# Patient Record
Sex: FEMALE | Race: WHITE | NOT HISPANIC OR LATINO | Employment: UNEMPLOYED | URBAN - METROPOLITAN AREA
[De-identification: names, ages, dates, MRNs, and addresses within clinical notes are randomized per-mention and may not be internally consistent; named-entity substitution may affect disease eponyms.]

---

## 2017-01-27 ENCOUNTER — HOSPITAL ENCOUNTER (OUTPATIENT)
Dept: URGENT CARE | Facility: CLINIC | Age: 32
Discharge: HOME | End: 2017-01-27
Attending: ALLERGY & IMMUNOLOGY | Admitting: ALLERGY & IMMUNOLOGY

## 2017-03-13 ENCOUNTER — OFFICE (OUTPATIENT)
Dept: URBAN - METROPOLITAN AREA CLINIC 64 | Facility: CLINIC | Age: 32
End: 2017-03-13
Payer: MEDICAID

## 2017-03-13 VITALS
WEIGHT: 203 LBS | DIASTOLIC BLOOD PRESSURE: 85 MMHG | SYSTOLIC BLOOD PRESSURE: 113 MMHG | HEART RATE: 101 BPM | HEIGHT: 59 IN

## 2017-03-13 DIAGNOSIS — K21.9 GASTRO-ESOPHAGEAL REFLUX DISEASE WITHOUT ESOPHAGITIS: ICD-10-CM

## 2017-03-13 DIAGNOSIS — K58.9 IRRITABLE BOWEL SYNDROME WITHOUT DIARRHEA: ICD-10-CM

## 2017-03-13 PROCEDURE — 99212 OFFICE O/P EST SF 10 MIN: CPT | Performed by: INTERNAL MEDICINE

## 2017-03-13 RX ORDER — LANSOPRAZOLE 30 MG/1
30 CAPSULE, DELAYED RELEASE ORAL
Qty: 30 | Refills: 11 | Status: COMPLETED
Start: 2017-03-13 | End: 2017-06-14

## 2017-03-28 ENCOUNTER — HOSPITAL ENCOUNTER (OUTPATIENT)
Dept: FAMILY MEDICINE CLINIC | Facility: CLINIC | Age: 32
Setting detail: SPECIMEN
Discharge: HOME | End: 2017-03-28
Attending: PHYSICIAN ASSISTANT | Admitting: PHYSICIAN ASSISTANT

## 2017-03-28 ENCOUNTER — CONVERSION ENCOUNTER (OUTPATIENT)
Dept: FAMILY MEDICINE CLINIC | Facility: CLINIC | Age: 32
End: 2017-03-28

## 2017-03-28 LAB
ALBUMIN SERPL-MCNC: 4.1 G/DL (ref 3.5–4.8)
ALBUMIN/GLOB SERPL: 1.3 {RATIO} (ref 1–1.7)
ALP SERPL-CCNC: 78 IU/L (ref 32–91)
ALT SERPL-CCNC: 23 IU/L (ref 14–54)
ANION GAP SERPL CALC-SCNC: 13.8 MMOL/L (ref 10–20)
AST SERPL-CCNC: 21 IU/L (ref 15–41)
BASOPHILS # BLD AUTO: 0 10*3/UL (ref 0–0.2)
BASOPHILS NFR BLD AUTO: 1 % (ref 0–2)
BILIRUB SERPL-MCNC: 1 MG/DL (ref 0.3–1.2)
BUN SERPL-MCNC: 9 MG/DL (ref 8–20)
BUN/CREAT SERPL: 9 (ref 5.4–26.2)
CALCIUM SERPL-MCNC: 9.7 MG/DL (ref 8.9–10.3)
CHLORIDE SERPL-SCNC: 104 MMOL/L (ref 101–111)
CHOLEST SERPL-MCNC: 172 MG/DL
CHOLEST/HDLC SERPL: 4.6 {RATIO}
CONV CO2: 22 MMOL/L (ref 22–32)
CONV LDL CHOLESTEROL DIRECT: 119 MG/DL (ref 0–100)
CONV TOTAL PROTEIN: 7.2 G/DL (ref 6.1–7.9)
CREAT UR-MCNC: 1 MG/DL (ref 0.4–1)
DIFFERENTIAL METHOD BLD: (no result)
EOSINOPHIL # BLD AUTO: 0.1 10*3/UL (ref 0–0.3)
EOSINOPHIL # BLD AUTO: 1 % (ref 0–3)
ERYTHROCYTE [DISTWIDTH] IN BLOOD BY AUTOMATED COUNT: 18.7 % (ref 11.5–14.5)
GLOBULIN UR ELPH-MCNC: 3.1 G/DL (ref 2.5–3.8)
GLUCOSE SERPL-MCNC: 83 MG/DL (ref 65–99)
HCT VFR BLD AUTO: 34.6 % (ref 35–49)
HDLC SERPL-MCNC: 37 MG/DL
HGB BLD-MCNC: 12.1 G/DL (ref 12–15)
LDLC/HDLC SERPL: 3.2 {RATIO}
LIPID INTERPRETATION: ABNORMAL
LYMPHOCYTES # BLD AUTO: 2 10*3/UL (ref 0.8–4.8)
LYMPHOCYTES NFR BLD AUTO: 30 % (ref 18–42)
MCH RBC QN AUTO: 27.2 PG (ref 26–32)
MCHC RBC AUTO-ENTMCNC: 35 G/DL (ref 32–36)
MCV RBC AUTO: 77.7 FL (ref 80–94)
MONOCYTES # BLD AUTO: 0.4 10*3/UL (ref 0.1–1.3)
MONOCYTES NFR BLD AUTO: 7 % (ref 2–11)
NEUTROPHILS # BLD AUTO: 4.1 10*3/UL (ref 2.3–8.6)
NEUTROPHILS NFR BLD AUTO: 61 % (ref 50–75)
NRBC BLD AUTO-RTO: 0 /100{WBCS}
NRBC/RBC NFR BLD MANUAL: 0 10*3/UL
PLATELET # BLD AUTO: 223 10*3/UL (ref 150–450)
PMV BLD AUTO: 9.5 FL (ref 7.4–10.4)
POTASSIUM SERPL-SCNC: 3.8 MMOL/L (ref 3.6–5.1)
RBC # BLD AUTO: 4.45 10*6/UL (ref 4–5.4)
SODIUM SERPL-SCNC: 136 MMOL/L (ref 136–144)
TRIGL SERPL-MCNC: 121 MG/DL
TSH SERPL-ACNC: 1.96 UIU/ML (ref 0.34–5.6)
VLDLC SERPL CALC-MCNC: 15.8 MG/DL
WBC # BLD AUTO: 6.7 10*3/UL (ref 4.5–11.5)

## 2017-05-22 ENCOUNTER — HOSPITAL ENCOUNTER (OUTPATIENT)
Dept: FAMILY MEDICINE CLINIC | Facility: CLINIC | Age: 32
Setting detail: SPECIMEN
Discharge: HOME | End: 2017-05-22
Attending: NURSE PRACTITIONER | Admitting: NURSE PRACTITIONER

## 2017-05-22 ENCOUNTER — CONVERSION ENCOUNTER (OUTPATIENT)
Dept: FAMILY MEDICINE CLINIC | Facility: CLINIC | Age: 32
End: 2017-05-22

## 2017-06-14 ENCOUNTER — OFFICE (OUTPATIENT)
Dept: URBAN - METROPOLITAN AREA CLINIC 64 | Facility: CLINIC | Age: 32
End: 2017-06-14
Payer: MEDICAID

## 2017-06-14 VITALS
HEART RATE: 102 BPM | SYSTOLIC BLOOD PRESSURE: 122 MMHG | HEIGHT: 59 IN | DIASTOLIC BLOOD PRESSURE: 83 MMHG | WEIGHT: 210 LBS

## 2017-06-14 DIAGNOSIS — R14.0 ABDOMINAL DISTENSION (GASEOUS): ICD-10-CM

## 2017-06-14 DIAGNOSIS — K58.9 IRRITABLE BOWEL SYNDROME WITHOUT DIARRHEA: ICD-10-CM

## 2017-06-14 PROCEDURE — 99213 OFFICE O/P EST LOW 20 MIN: CPT | Performed by: NURSE PRACTITIONER

## 2017-06-22 ENCOUNTER — HOSPITAL ENCOUNTER (OUTPATIENT)
Dept: LAB | Facility: HOSPITAL | Age: 32
Discharge: HOME | End: 2017-06-22
Attending: PSYCHIATRY & NEUROLOGY | Admitting: PSYCHIATRY & NEUROLOGY

## 2017-06-22 LAB — GLUCOSE SERPL-MCNC: 105 MG/DL (ref 65–99)

## 2017-06-28 ENCOUNTER — CONVERSION ENCOUNTER (OUTPATIENT)
Dept: FAMILY MEDICINE CLINIC | Facility: CLINIC | Age: 32
End: 2017-06-28

## 2017-06-28 ENCOUNTER — HOSPITAL ENCOUNTER (OUTPATIENT)
Dept: FAMILY MEDICINE CLINIC | Facility: CLINIC | Age: 32
Setting detail: SPECIMEN
Discharge: HOME | End: 2017-06-28
Attending: FAMILY MEDICINE | Admitting: FAMILY MEDICINE

## 2017-06-28 LAB
CHOLEST SERPL-MCNC: 160 MG/DL
CHOLEST/HDLC SERPL: 4.2 {RATIO}
CONV LDL CHOLESTEROL DIRECT: 112 MG/DL (ref 0–100)
HDLC SERPL-MCNC: 38 MG/DL
LDLC/HDLC SERPL: 3 {RATIO}
LIPID INTERPRETATION: ABNORMAL
TRIGL SERPL-MCNC: 93 MG/DL
TSH SERPL-ACNC: 2.44 UIU/ML (ref 0.34–5.6)
VLDLC SERPL CALC-MCNC: 10.1 MG/DL

## 2017-10-20 ENCOUNTER — CONVERSION ENCOUNTER (OUTPATIENT)
Dept: FAMILY MEDICINE CLINIC | Facility: CLINIC | Age: 32
End: 2017-10-20

## 2017-11-11 ENCOUNTER — CONVERSION ENCOUNTER (OUTPATIENT)
Dept: FAMILY MEDICINE CLINIC | Facility: CLINIC | Age: 32
End: 2017-11-11

## 2017-12-29 ENCOUNTER — CONVERSION ENCOUNTER (OUTPATIENT)
Dept: FAMILY MEDICINE CLINIC | Facility: CLINIC | Age: 32
End: 2017-12-29

## 2017-12-29 ENCOUNTER — HOSPITAL ENCOUNTER (OUTPATIENT)
Dept: FAMILY MEDICINE CLINIC | Facility: CLINIC | Age: 32
Setting detail: SPECIMEN
Discharge: HOME | End: 2017-12-29
Attending: FAMILY MEDICINE | Admitting: FAMILY MEDICINE

## 2017-12-29 LAB
ALBUMIN SERPL-MCNC: 4.3 G/DL (ref 3.5–4.8)
ALBUMIN/GLOB SERPL: 1.4 {RATIO} (ref 1–1.7)
ALP SERPL-CCNC: 67 IU/L (ref 32–91)
ALT SERPL-CCNC: 21 IU/L (ref 14–54)
ANION GAP SERPL CALC-SCNC: 10 MMOL/L (ref 10–20)
AST SERPL-CCNC: 21 IU/L (ref 15–41)
BASOPHILS # BLD AUTO: 0 10*3/UL (ref 0–0.2)
BASOPHILS NFR BLD AUTO: 0 % (ref 0–2)
BILIRUB SERPL-MCNC: 0.7 MG/DL (ref 0.3–1.2)
BILIRUB UR QL STRIP: NEGATIVE MG/DL
BUN SERPL-MCNC: 6 MG/DL (ref 8–20)
BUN/CREAT SERPL: 7.5 (ref 5.4–26.2)
CALCIUM SERPL-MCNC: 9.6 MG/DL (ref 8.9–10.3)
CASTS URNS QL MICRO: NORMAL /[LPF]
CHLORIDE SERPL-SCNC: 106 MMOL/L (ref 101–111)
COLOR UR: YELLOW
CONV BACTERIA IN URINE MICRO: NEGATIVE
CONV CLARITY OF URINE: CLEAR
CONV CO2: 25 MMOL/L (ref 22–32)
CONV HYALINE CASTS IN URINE MICRO: 1 /[LPF] (ref 0–5)
CONV PROTEIN IN URINE BY AUTOMATED TEST STRIP: NEGATIVE MG/DL
CONV SMALL ROUND CELLS: NORMAL /[HPF]
CONV TOTAL PROTEIN: 7.3 G/DL (ref 6.1–7.9)
CONV UROBILINOGEN IN URINE BY AUTOMATED TEST STRIP: 0.2 MG/DL
CREAT UR-MCNC: 0.8 MG/DL (ref 0.4–1)
CULTURE INDICATED?: NORMAL
DIFFERENTIAL METHOD BLD: (no result)
EOSINOPHIL # BLD AUTO: 0.1 10*3/UL (ref 0–0.3)
EOSINOPHIL # BLD AUTO: 2 % (ref 0–3)
ERYTHROCYTE [DISTWIDTH] IN BLOOD BY AUTOMATED COUNT: 19.1 % (ref 11.5–14.5)
GLOBULIN UR ELPH-MCNC: 3 G/DL (ref 2.5–3.8)
GLUCOSE SERPL-MCNC: 97 MG/DL (ref 65–99)
GLUCOSE UR QL: NEGATIVE MG/DL
HCT VFR BLD AUTO: 36.4 % (ref 35–49)
HGB BLD-MCNC: 12.1 G/DL (ref 12–15)
HGB UR QL STRIP: NEGATIVE
KETONES UR QL STRIP: NEGATIVE MG/DL
LEUKOCYTE ESTERASE UR QL STRIP: NEGATIVE
LYMPHOCYTES # BLD AUTO: 1.8 10*3/UL (ref 0.8–4.8)
LYMPHOCYTES NFR BLD AUTO: 29 % (ref 18–42)
MCH RBC QN AUTO: 26.4 PG (ref 26–32)
MCHC RBC AUTO-ENTMCNC: 33.1 G/DL (ref 32–36)
MCV RBC AUTO: 79.8 FL (ref 80–94)
MONOCYTES # BLD AUTO: 0.6 10*3/UL (ref 0.1–1.3)
MONOCYTES NFR BLD AUTO: 10 % (ref 2–11)
NEUTROPHILS # BLD AUTO: 3.7 10*3/UL (ref 2.3–8.6)
NEUTROPHILS NFR BLD AUTO: 59 % (ref 50–75)
NITRITE UR QL STRIP: NEGATIVE
NRBC BLD AUTO-RTO: 0 /100{WBCS}
NRBC/RBC NFR BLD MANUAL: 0 10*3/UL
PH UR STRIP.AUTO: 6.5 [PH] (ref 4.5–8)
PLATELET # BLD AUTO: 259 10*3/UL (ref 150–450)
PMV BLD AUTO: 9.3 FL (ref 7.4–10.4)
POTASSIUM SERPL-SCNC: 4 MMOL/L (ref 3.6–5.1)
RBC # BLD AUTO: 4.56 10*6/UL (ref 4–5.4)
RBC #/AREA URNS HPF: 1 /[HPF] (ref 0–3)
SODIUM SERPL-SCNC: 137 MMOL/L (ref 136–144)
SP GR UR: 1.01 (ref 1–1.03)
SPERM URNS QL MICRO: NORMAL /[HPF]
SQUAMOUS SPT QL MICRO: 1 /[HPF] (ref 0–5)
UNIDENT CRYS URNS QL MICRO: NORMAL /[HPF]
WBC # BLD AUTO: 6.2 10*3/UL (ref 4.5–11.5)
WBC #/AREA URNS HPF: 1 /[HPF] (ref 0–5)
YEAST SPEC QL WET PREP: NORMAL /[HPF]

## 2017-12-31 ENCOUNTER — CONVERSION ENCOUNTER (OUTPATIENT)
Dept: FAMILY MEDICINE CLINIC | Facility: CLINIC | Age: 32
End: 2017-12-31

## 2018-02-10 ENCOUNTER — CONVERSION ENCOUNTER (OUTPATIENT)
Dept: FAMILY MEDICINE CLINIC | Facility: CLINIC | Age: 33
End: 2018-02-10

## 2018-03-19 ENCOUNTER — HOSPITAL ENCOUNTER (OUTPATIENT)
Dept: FAMILY MEDICINE CLINIC | Facility: CLINIC | Age: 33
Setting detail: SPECIMEN
Discharge: HOME | End: 2018-03-19
Attending: FAMILY MEDICINE | Admitting: FAMILY MEDICINE

## 2018-03-19 ENCOUNTER — CONVERSION ENCOUNTER (OUTPATIENT)
Dept: FAMILY MEDICINE CLINIC | Facility: CLINIC | Age: 33
End: 2018-03-19

## 2018-06-18 ENCOUNTER — CONVERSION ENCOUNTER (OUTPATIENT)
Dept: FAMILY MEDICINE CLINIC | Facility: CLINIC | Age: 33
End: 2018-06-18

## 2018-09-13 ENCOUNTER — CONVERSION ENCOUNTER (OUTPATIENT)
Dept: FAMILY MEDICINE CLINIC | Facility: CLINIC | Age: 33
End: 2018-09-13

## 2018-10-11 ENCOUNTER — OFFICE (OUTPATIENT)
Dept: URBAN - METROPOLITAN AREA CLINIC 64 | Facility: CLINIC | Age: 33
End: 2018-10-11
Payer: MEDICAID

## 2018-10-11 VITALS
HEART RATE: 80 BPM | SYSTOLIC BLOOD PRESSURE: 105 MMHG | DIASTOLIC BLOOD PRESSURE: 68 MMHG | WEIGHT: 153 LBS | HEIGHT: 59 IN

## 2018-10-11 DIAGNOSIS — K58.9 IRRITABLE BOWEL SYNDROME WITHOUT DIARRHEA: ICD-10-CM

## 2018-10-11 DIAGNOSIS — K21.9 GASTRO-ESOPHAGEAL REFLUX DISEASE WITHOUT ESOPHAGITIS: ICD-10-CM

## 2018-10-11 PROCEDURE — 99213 OFFICE O/P EST LOW 20 MIN: CPT | Performed by: NURSE PRACTITIONER

## 2018-10-11 RX ORDER — OMEPRAZOLE 40 MG/1
40 CAPSULE, DELAYED RELEASE ORAL
Qty: 90 | Refills: 3 | Status: ACTIVE

## 2018-10-30 ENCOUNTER — HOSPITAL ENCOUNTER (OUTPATIENT)
Dept: FAMILY MEDICINE CLINIC | Facility: CLINIC | Age: 33
Setting detail: SPECIMEN
Discharge: HOME | End: 2018-10-30
Attending: FAMILY MEDICINE | Admitting: FAMILY MEDICINE

## 2018-10-31 LAB
ALBUMIN SERPL-MCNC: 4.2 G/DL (ref 3.5–4.8)
ALBUMIN/GLOB SERPL: 1.6 {RATIO} (ref 1–1.7)
ALP SERPL-CCNC: 74 IU/L (ref 32–91)
ALT SERPL-CCNC: 19 IU/L (ref 14–54)
ANION GAP SERPL CALC-SCNC: 15.2 MMOL/L (ref 10–20)
AST SERPL-CCNC: 17 IU/L (ref 15–41)
BASOPHILS # BLD AUTO: 0 10*3/UL (ref 0–0.2)
BASOPHILS NFR BLD AUTO: 1 % (ref 0–2)
BILIRUB SERPL-MCNC: 0.8 MG/DL (ref 0.3–1.2)
BUN SERPL-MCNC: 13 MG/DL (ref 8–20)
BUN/CREAT SERPL: 11.8 (ref 5.4–26.2)
CALCIUM SERPL-MCNC: 9.4 MG/DL (ref 8.9–10.3)
CHLORIDE SERPL-SCNC: 108 MMOL/L (ref 101–111)
CONV CO2: 20 MMOL/L (ref 22–32)
CONV TOTAL PROTEIN: 6.9 G/DL (ref 6.1–7.9)
CREAT UR-MCNC: 1.1 MG/DL (ref 0.4–1)
DIFFERENTIAL METHOD BLD: (no result)
EOSINOPHIL # BLD AUTO: 0.1 10*3/UL (ref 0–0.3)
EOSINOPHIL # BLD AUTO: 1 % (ref 0–3)
ERYTHROCYTE [DISTWIDTH] IN BLOOD BY AUTOMATED COUNT: 18.8 % (ref 11.5–14.5)
GLOBULIN UR ELPH-MCNC: 2.7 G/DL (ref 2.5–3.8)
GLUCOSE SERPL-MCNC: 76 MG/DL (ref 65–99)
HCT VFR BLD AUTO: 37.5 % (ref 35–49)
HGB BLD-MCNC: 12.9 G/DL (ref 12–15)
LYMPHOCYTES # BLD AUTO: 1.7 10*3/UL (ref 0.8–4.8)
LYMPHOCYTES NFR BLD AUTO: 22 % (ref 18–42)
MCH RBC QN AUTO: 28.1 PG (ref 26–32)
MCHC RBC AUTO-ENTMCNC: 34.3 G/DL (ref 32–36)
MCV RBC AUTO: 82 FL (ref 80–94)
MONOCYTES # BLD AUTO: 0.6 10*3/UL (ref 0.1–1.3)
MONOCYTES NFR BLD AUTO: 9 % (ref 2–11)
NEUTROPHILS # BLD AUTO: 5.2 10*3/UL (ref 2.3–8.6)
NEUTROPHILS NFR BLD AUTO: 67 % (ref 50–75)
NRBC BLD AUTO-RTO: 0 /100{WBCS}
NRBC/RBC NFR BLD MANUAL: 0 10*3/UL
PLATELET # BLD AUTO: 223 10*3/UL (ref 150–450)
PMV BLD AUTO: 10.8 FL (ref 7.4–10.4)
POTASSIUM SERPL-SCNC: 4.2 MMOL/L (ref 3.6–5.1)
RBC # BLD AUTO: 4.57 10*6/UL (ref 4–5.4)
SODIUM SERPL-SCNC: 139 MMOL/L (ref 136–144)
T4 FREE SERPL-MCNC: 1.01 NG/DL (ref 0.58–1.64)
TSH SERPL-ACNC: 0.26 UIU/ML (ref 0.34–5.6)
WBC # BLD AUTO: 7.6 10*3/UL (ref 4.5–11.5)

## 2018-11-27 ENCOUNTER — CONVERSION ENCOUNTER (OUTPATIENT)
Dept: FAMILY MEDICINE CLINIC | Facility: CLINIC | Age: 33
End: 2018-11-27

## 2018-12-14 ENCOUNTER — CONVERSION ENCOUNTER (OUTPATIENT)
Dept: FAMILY MEDICINE CLINIC | Facility: CLINIC | Age: 33
End: 2018-12-14

## 2018-12-14 ENCOUNTER — HOSPITAL ENCOUNTER (OUTPATIENT)
Dept: LAB | Facility: HOSPITAL | Age: 33
Discharge: HOME | End: 2018-12-14
Attending: INTERNAL MEDICINE | Admitting: INTERNAL MEDICINE

## 2018-12-14 LAB — TSH SERPL-ACNC: 1.05 UIU/ML (ref 0.34–5.6)

## 2019-01-08 ENCOUNTER — HOSPITAL ENCOUNTER (OUTPATIENT)
Dept: LAB | Facility: HOSPITAL | Age: 34
Setting detail: SPECIMEN
Discharge: HOME | End: 2019-01-08
Attending: NURSE PRACTITIONER | Admitting: NURSE PRACTITIONER

## 2019-01-08 LAB
BILIRUB UR QL STRIP: NEGATIVE MG/DL
CASTS URNS QL MICRO: ABNORMAL /[LPF]
COLOR UR: YELLOW
CONV BACTERIA IN URINE MICRO: NEGATIVE
CONV CLARITY OF URINE: CLEAR
CONV HYALINE CASTS IN URINE MICRO: 0 /[LPF] (ref 0–5)
CONV PROTEIN IN URINE BY AUTOMATED TEST STRIP: NEGATIVE MG/DL
CONV SMALL ROUND CELLS: ABNORMAL /[HPF]
CONV UROBILINOGEN IN URINE BY AUTOMATED TEST STRIP: 0.2 MG/DL
CULTURE INDICATED?: ABNORMAL
GLUCOSE UR QL: NEGATIVE MG/DL
HGB UR QL STRIP: ABNORMAL
KETONES UR QL STRIP: NEGATIVE MG/DL
LEUKOCYTE ESTERASE UR QL STRIP: NEGATIVE
NITRITE UR QL STRIP: NEGATIVE
PH UR STRIP.AUTO: 6 [PH] (ref 4.5–8)
RBC #/AREA URNS HPF: 0 /[HPF] (ref 0–3)
SP GR UR: 1.01 (ref 1–1.03)
SPECIMEN SOURCE: ABNORMAL
SPERM URNS QL MICRO: ABNORMAL /[HPF]
SQUAMOUS SPT QL MICRO: 0 /[HPF] (ref 0–5)
UNIDENT CRYS URNS QL MICRO: ABNORMAL /[HPF]
WBC #/AREA URNS HPF: 1 /[HPF] (ref 0–5)
YEAST SPEC QL WET PREP: ABNORMAL /[HPF]

## 2019-01-10 ENCOUNTER — CONVERSION ENCOUNTER (OUTPATIENT)
Dept: FAMILY MEDICINE CLINIC | Facility: CLINIC | Age: 34
End: 2019-01-10

## 2019-01-16 ENCOUNTER — HOSPITAL ENCOUNTER (OUTPATIENT)
Dept: CARDIOLOGY | Facility: HOSPITAL | Age: 34
Discharge: HOME | End: 2019-01-16
Attending: INTERNAL MEDICINE | Admitting: INTERNAL MEDICINE

## 2019-02-04 ENCOUNTER — CONVERSION ENCOUNTER (OUTPATIENT)
Dept: FAMILY MEDICINE CLINIC | Facility: CLINIC | Age: 34
End: 2019-02-04

## 2019-03-14 ENCOUNTER — CONVERSION ENCOUNTER (OUTPATIENT)
Dept: FAMILY MEDICINE CLINIC | Facility: CLINIC | Age: 34
End: 2019-03-14

## 2019-03-14 ENCOUNTER — HOSPITAL ENCOUNTER (OUTPATIENT)
Dept: FAMILY MEDICINE CLINIC | Facility: CLINIC | Age: 34
Setting detail: SPECIMEN
Discharge: HOME | End: 2019-03-14
Attending: FAMILY MEDICINE | Admitting: FAMILY MEDICINE

## 2019-04-09 ENCOUNTER — HOSPITAL ENCOUNTER (OUTPATIENT)
Dept: URGENT CARE | Facility: CLINIC | Age: 34
Discharge: HOME | End: 2019-04-09
Attending: FAMILY MEDICINE | Admitting: FAMILY MEDICINE

## 2019-04-09 ENCOUNTER — CONVERSION ENCOUNTER (OUTPATIENT)
Dept: FAMILY MEDICINE CLINIC | Facility: CLINIC | Age: 34
End: 2019-04-09

## 2019-04-30 ENCOUNTER — HOSPITAL ENCOUNTER (OUTPATIENT)
Dept: FAMILY MEDICINE CLINIC | Facility: CLINIC | Age: 34
Setting detail: SPECIMEN
Discharge: HOME | End: 2019-04-30
Attending: NURSE PRACTITIONER | Admitting: NURSE PRACTITIONER

## 2019-04-30 ENCOUNTER — CONVERSION ENCOUNTER (OUTPATIENT)
Dept: FAMILY MEDICINE CLINIC | Facility: CLINIC | Age: 34
End: 2019-04-30

## 2019-04-30 LAB
BASOPHILS # BLD AUTO: 0.1 10*3/UL (ref 0–0.2)
BASOPHILS NFR BLD AUTO: 1 % (ref 0–2)
DIFFERENTIAL METHOD BLD: (no result)
EOSINOPHIL # BLD AUTO: 0.1 10*3/UL (ref 0–0.3)
EOSINOPHIL # BLD AUTO: 1 % (ref 0–3)
ERYTHROCYTE [DISTWIDTH] IN BLOOD BY AUTOMATED COUNT: 16.4 % (ref 11.5–14.5)
ERYTHROCYTE [SEDIMENTATION RATE] IN BLOOD BY WESTERGREN METHOD: 21 MM/HR (ref 0–20)
HCT VFR BLD AUTO: 37.6 % (ref 35–49)
HGB BLD-MCNC: 13 G/DL (ref 12–15)
LYMPHOCYTES # BLD AUTO: 1.7 10*3/UL (ref 0.8–4.8)
LYMPHOCYTES NFR BLD AUTO: 22 % (ref 18–42)
MCH RBC QN AUTO: 28.6 PG (ref 26–32)
MCHC RBC AUTO-ENTMCNC: 34.4 G/DL (ref 32–36)
MCV RBC AUTO: 83.1 FL (ref 80–94)
MONOCYTES # BLD AUTO: 0.5 10*3/UL (ref 0.1–1.3)
MONOCYTES NFR BLD AUTO: 7 % (ref 2–11)
NEUTROPHILS # BLD AUTO: 5.3 10*3/UL (ref 2.3–8.6)
NEUTROPHILS NFR BLD AUTO: 69 % (ref 50–75)
NRBC BLD AUTO-RTO: 0 /100{WBCS}
NRBC/RBC NFR BLD MANUAL: 0 10*3/UL
PLATELET # BLD AUTO: 264 10*3/UL (ref 150–450)
PMV BLD AUTO: 9.4 FL (ref 7.4–10.4)
RBC # BLD AUTO: 4.53 10*6/UL (ref 4–5.4)
WBC # BLD AUTO: 7.6 10*3/UL (ref 4.5–11.5)

## 2019-06-04 VITALS
HEART RATE: 78 BPM | HEART RATE: 102 BPM | HEIGHT: 61 IN | DIASTOLIC BLOOD PRESSURE: 70 MMHG | HEART RATE: 108 BPM | WEIGHT: 208 LBS | RESPIRATION RATE: 20 BRPM | WEIGHT: 168 LBS | DIASTOLIC BLOOD PRESSURE: 69 MMHG | OXYGEN SATURATION: 97 % | RESPIRATION RATE: 14 BRPM | BODY MASS INDEX: 31.72 KG/M2 | DIASTOLIC BLOOD PRESSURE: 71 MMHG | HEART RATE: 94 BPM | OXYGEN SATURATION: 96 % | WEIGHT: 165.2 LBS | OXYGEN SATURATION: 100 % | DIASTOLIC BLOOD PRESSURE: 64 MMHG | HEART RATE: 91 BPM | RESPIRATION RATE: 20 BRPM | SYSTOLIC BLOOD PRESSURE: 114 MMHG | BODY MASS INDEX: 30.62 KG/M2 | DIASTOLIC BLOOD PRESSURE: 71 MMHG | BODY MASS INDEX: 37.16 KG/M2 | BODY MASS INDEX: 29.53 KG/M2 | DIASTOLIC BLOOD PRESSURE: 67 MMHG | RESPIRATION RATE: 18 BRPM | SYSTOLIC BLOOD PRESSURE: 124 MMHG | DIASTOLIC BLOOD PRESSURE: 74 MMHG | HEART RATE: 79 BPM | OXYGEN SATURATION: 98 % | OXYGEN SATURATION: 99 % | WEIGHT: 205 LBS | RESPIRATION RATE: 16 BRPM | HEIGHT: 61 IN | BODY MASS INDEX: 30.42 KG/M2 | BODY MASS INDEX: 39.57 KG/M2 | DIASTOLIC BLOOD PRESSURE: 73 MMHG | HEART RATE: 100 BPM | SYSTOLIC BLOOD PRESSURE: 106 MMHG | SYSTOLIC BLOOD PRESSURE: 114 MMHG | BODY MASS INDEX: 41.8 KG/M2 | DIASTOLIC BLOOD PRESSURE: 65 MMHG | OXYGEN SATURATION: 100 % | BODY MASS INDEX: 29.17 KG/M2 | HEART RATE: 73 BPM | SYSTOLIC BLOOD PRESSURE: 117 MMHG | BODY MASS INDEX: 38.53 KG/M2 | RESPIRATION RATE: 18 BRPM | DIASTOLIC BLOOD PRESSURE: 78 MMHG | HEIGHT: 61 IN | HEART RATE: 84 BPM | RESPIRATION RATE: 14 BRPM | SYSTOLIC BLOOD PRESSURE: 107 MMHG | BODY MASS INDEX: 30.36 KG/M2 | SYSTOLIC BLOOD PRESSURE: 100 MMHG | WEIGHT: 160.8 LBS | SYSTOLIC BLOOD PRESSURE: 114 MMHG | BODY MASS INDEX: 41.16 KG/M2 | BODY MASS INDEX: 38.33 KG/M2 | DIASTOLIC BLOOD PRESSURE: 66 MMHG | HEIGHT: 61 IN | SYSTOLIC BLOOD PRESSURE: 136 MMHG | DIASTOLIC BLOOD PRESSURE: 74 MMHG | SYSTOLIC BLOOD PRESSURE: 94 MMHG | HEIGHT: 61 IN | HEIGHT: 61 IN | RESPIRATION RATE: 16 BRPM | DIASTOLIC BLOOD PRESSURE: 81 MMHG | SYSTOLIC BLOOD PRESSURE: 111 MMHG | HEART RATE: 98 BPM | BODY MASS INDEX: 38.71 KG/M2 | WEIGHT: 156.4 LBS | SYSTOLIC BLOOD PRESSURE: 103 MMHG | HEIGHT: 61 IN | RESPIRATION RATE: 20 BRPM | BODY MASS INDEX: 34.48 KG/M2 | WEIGHT: 203.9 LBS | HEIGHT: 61 IN | DIASTOLIC BLOOD PRESSURE: 69 MMHG | BODY MASS INDEX: 29.64 KG/M2 | HEIGHT: 61 IN | OXYGEN SATURATION: 100 % | WEIGHT: 168.2 LBS | RESPIRATION RATE: 20 BRPM | SYSTOLIC BLOOD PRESSURE: 138 MMHG | SYSTOLIC BLOOD PRESSURE: 101 MMHG | SYSTOLIC BLOOD PRESSURE: 100 MMHG | SYSTOLIC BLOOD PRESSURE: 134 MMHG | OXYGEN SATURATION: 99 % | HEART RATE: 100 BPM | OXYGEN SATURATION: 97 % | HEART RATE: 98 BPM | OXYGEN SATURATION: 100 % | DIASTOLIC BLOOD PRESSURE: 75 MMHG | OXYGEN SATURATION: 97 % | HEIGHT: 61 IN | HEART RATE: 84 BPM | SYSTOLIC BLOOD PRESSURE: 93 MMHG | DIASTOLIC BLOOD PRESSURE: 78 MMHG | BODY MASS INDEX: 31.37 KG/M2 | DIASTOLIC BLOOD PRESSURE: 75 MMHG | WEIGHT: 209.6 LBS | WEIGHT: 157 LBS | OXYGEN SATURATION: 98 % | HEIGHT: 61 IN | HEIGHT: 61 IN | WEIGHT: 221.4 LBS | OXYGEN SATURATION: 98 % | OXYGEN SATURATION: 99 % | HEART RATE: 100 BPM | OXYGEN SATURATION: 97 % | WEIGHT: 161 LBS | BODY MASS INDEX: 39.3 KG/M2 | WEIGHT: 196.8 LBS | WEIGHT: 218 LBS | HEIGHT: 61 IN | RESPIRATION RATE: 16 BRPM | OXYGEN SATURATION: 98 % | WEIGHT: 182.6 LBS | HEART RATE: 108 BPM | RESPIRATION RATE: 16 BRPM | OXYGEN SATURATION: 100 % | BODY MASS INDEX: 31.19 KG/M2 | HEIGHT: 61 IN | SYSTOLIC BLOOD PRESSURE: 103 MMHG | DIASTOLIC BLOOD PRESSURE: 83 MMHG | HEART RATE: 95 BPM | OXYGEN SATURATION: 97 % | HEART RATE: 69 BPM | WEIGHT: 203 LBS | HEART RATE: 112 BPM | WEIGHT: 162.2 LBS

## 2019-07-01 ENCOUNTER — TELEPHONE (OUTPATIENT)
Dept: FAMILY MEDICINE CLINIC | Facility: CLINIC | Age: 34
End: 2019-07-01

## 2019-07-01 RX ORDER — FLUCONAZOLE 150 MG/1
150 TABLET ORAL ONCE
Qty: 1 TABLET | Refills: 0 | Status: SHIPPED | OUTPATIENT
Start: 2019-07-01 | End: 2019-07-01

## 2019-08-12 ENCOUNTER — HOSPITAL ENCOUNTER (EMERGENCY)
Facility: HOSPITAL | Age: 34
Discharge: HOME | End: 2019-08-12
Attending: EMERGENCY MEDICINE | Admitting: EMERGENCY MEDICINE

## 2019-08-12 ENCOUNTER — APPOINTMENT (OUTPATIENT)
Dept: RESPIRATORY THERAPY | Facility: HOSPITAL | Age: 34
End: 2019-08-12

## 2019-08-12 ENCOUNTER — APPOINTMENT (OUTPATIENT)
Dept: CT IMAGING | Facility: HOSPITAL | Age: 34
End: 2019-08-12

## 2019-08-12 ENCOUNTER — APPOINTMENT (OUTPATIENT)
Dept: GENERAL RADIOLOGY | Facility: HOSPITAL | Age: 34
End: 2019-08-12

## 2019-08-12 VITALS
RESPIRATION RATE: 17 BRPM | HEIGHT: 63 IN | HEART RATE: 72 BPM | SYSTOLIC BLOOD PRESSURE: 116 MMHG | BODY MASS INDEX: 30.94 KG/M2 | OXYGEN SATURATION: 100 % | WEIGHT: 174.6 LBS | DIASTOLIC BLOOD PRESSURE: 65 MMHG | TEMPERATURE: 98.7 F

## 2019-08-12 DIAGNOSIS — R55 SYNCOPE, UNSPECIFIED SYNCOPE TYPE: Primary | ICD-10-CM

## 2019-08-12 DIAGNOSIS — S60.212A CONTUSION OF LEFT WRIST, INITIAL ENCOUNTER: ICD-10-CM

## 2019-08-12 LAB
ALBUMIN SERPL-MCNC: 4.1 G/DL (ref 3.5–4.8)
ALBUMIN/GLOB SERPL: 1.6 G/DL (ref 1–1.7)
ALP SERPL-CCNC: 68 U/L (ref 32–91)
ALT SERPL W P-5'-P-CCNC: 17 U/L (ref 14–54)
ANION GAP SERPL CALCULATED.3IONS-SCNC: 13.4 MMOL/L (ref 5–15)
APTT PPP: 18.3 SECONDS (ref 24–31)
AST SERPL-CCNC: 35 U/L (ref 15–41)
BILIRUB SERPL-MCNC: 1.1 MG/DL (ref 0.3–1.2)
BUN BLD-MCNC: 12 MG/DL (ref 8–20)
BUN/CREAT SERPL: 12 (ref 5.4–26.2)
BURR CELLS BLD QL SMEAR: NORMAL
CALCIUM SPEC-SCNC: 8.6 MG/DL (ref 8.9–10.3)
CHLORIDE SERPL-SCNC: 108 MMOL/L (ref 101–111)
CO2 SERPL-SCNC: 15 MMOL/L (ref 22–32)
CREAT BLD-MCNC: 1 MG/DL (ref 0.4–1)
DEPRECATED RDW RBC AUTO: 50.3 FL (ref 37–54)
EOSINOPHIL # BLD MANUAL: 0.16 10*3/MM3 (ref 0–0.4)
EOSINOPHIL NFR BLD MANUAL: 2 % (ref 0.3–6.2)
ERYTHROCYTE [DISTWIDTH] IN BLOOD BY AUTOMATED COUNT: 17.4 % (ref 12.3–15.4)
GFR SERPL CREATININE-BSD FRML MDRD: 63 ML/MIN/1.73
GLOBULIN UR ELPH-MCNC: 2.5 GM/DL (ref 2.5–3.8)
GLUCOSE BLD-MCNC: 116 MG/DL (ref 65–99)
HCT VFR BLD AUTO: 36.1 % (ref 34–46.6)
HGB BLD-MCNC: 12.1 G/DL (ref 12–15.9)
INR PPP: 0.99 (ref 0.9–1.1)
LYMPHOCYTES # BLD MANUAL: 1.64 10*3/MM3 (ref 0.7–3.1)
LYMPHOCYTES NFR BLD MANUAL: 20 % (ref 19.6–45.3)
LYMPHOCYTES NFR BLD MANUAL: 6 % (ref 5–12)
MCH RBC QN AUTO: 27.5 PG (ref 26.6–33)
MCHC RBC AUTO-ENTMCNC: 33.7 G/DL (ref 31.5–35.7)
MCV RBC AUTO: 81.7 FL (ref 79–97)
MONOCYTES # BLD AUTO: 0.49 10*3/MM3 (ref 0.1–0.9)
NEUTROPHILS # BLD AUTO: 5.9 10*3/MM3 (ref 1.7–7)
NEUTROPHILS NFR BLD MANUAL: 72 % (ref 42.7–76)
OVALOCYTES BLD QL SMEAR: NORMAL
PLAT MORPH BLD: NORMAL
PLATELET # BLD AUTO: 243 10*3/MM3 (ref 140–450)
PMV BLD AUTO: 10.2 FL (ref 6–12)
POTASSIUM BLD-SCNC: 3.4 MMOL/L (ref 3.6–5.1)
PROT SERPL-MCNC: 6.6 G/DL (ref 6.1–7.9)
PROTHROMBIN TIME: 10.2 SECONDS (ref 9.6–11.7)
RBC # BLD AUTO: 4.42 10*6/MM3 (ref 3.77–5.28)
SCAN SLIDE: NORMAL
SODIUM BLD-SCNC: 133 MMOL/L (ref 136–144)
TROPONIN I SERPL-MCNC: <0.03 NG/ML (ref 0–0.03)
WBC MORPH BLD: NORMAL
WBC NRBC COR # BLD: 8.2 10*3/MM3 (ref 3.4–10.8)

## 2019-08-12 PROCEDURE — 85730 THROMBOPLASTIN TIME PARTIAL: CPT | Performed by: EMERGENCY MEDICINE

## 2019-08-12 PROCEDURE — 73110 X-RAY EXAM OF WRIST: CPT

## 2019-08-12 PROCEDURE — 85007 BL SMEAR W/DIFF WBC COUNT: CPT | Performed by: EMERGENCY MEDICINE

## 2019-08-12 PROCEDURE — 93005 ELECTROCARDIOGRAM TRACING: CPT | Performed by: EMERGENCY MEDICINE

## 2019-08-12 PROCEDURE — 99283 EMERGENCY DEPT VISIT LOW MDM: CPT

## 2019-08-12 PROCEDURE — 80053 COMPREHEN METABOLIC PANEL: CPT | Performed by: EMERGENCY MEDICINE

## 2019-08-12 PROCEDURE — 85610 PROTHROMBIN TIME: CPT | Performed by: EMERGENCY MEDICINE

## 2019-08-12 PROCEDURE — 85025 COMPLETE CBC W/AUTO DIFF WBC: CPT | Performed by: EMERGENCY MEDICINE

## 2019-08-12 PROCEDURE — 71045 X-RAY EXAM CHEST 1 VIEW: CPT

## 2019-08-12 PROCEDURE — 70450 CT HEAD/BRAIN W/O DYE: CPT

## 2019-08-12 PROCEDURE — 93225 XTRNL ECG REC<48 HRS REC: CPT

## 2019-08-12 PROCEDURE — 84484 ASSAY OF TROPONIN QUANT: CPT | Performed by: EMERGENCY MEDICINE

## 2019-08-12 RX ORDER — SODIUM CHLORIDE 0.9 % (FLUSH) 0.9 %
10 SYRINGE (ML) INJECTION AS NEEDED
Status: DISCONTINUED | OUTPATIENT
Start: 2019-08-12 | End: 2019-08-12 | Stop reason: HOSPADM

## 2019-08-12 NOTE — DISCHARGE INSTRUCTIONS
Follow-up with your primary doctor.  Return to the emergency room for any new or worsening symptoms or if you have any other questions or concerns.

## 2019-08-12 NOTE — ED PROVIDER NOTES
Subjective   34-year-old female presents after a syncopal episode.  Patient states she was at work and was feeling fine and the next thing she knew she was waking up on the floor with people standing over her.  She denies having any chest pain or trouble breathing.  She denies any dizziness.  She has had no recent illness.  She states she has a history of arrhythmia but is not sure exactly what the arrhythmia was.  She states now she is feeling tired but otherwise feels okay.  Apparently witnesses stated she hit her head hard on the ground.  She is also complaining of some left wrist pain.        History provided by:  Patient      Review of Systems   Constitutional: Negative for fatigue and fever.   HENT: Negative for congestion and sore throat.    Eyes: Negative for pain and redness.   Respiratory: Negative for cough and shortness of breath.    Cardiovascular: Negative for chest pain and palpitations.   Gastrointestinal: Negative for abdominal pain and vomiting.   Genitourinary: Negative for dysuria and frequency.   Musculoskeletal: Negative for back pain.   Skin: Negative for rash.   Neurological: Positive for syncope. Negative for headaches.   Psychiatric/Behavioral: Negative for behavioral problems and confusion.       Past Medical History:   Diagnosis Date   • Anxiety    • Arrhythmia    • Asthma    • Depression    • Disease of thyroid gland    • GERD (gastroesophageal reflux disease)    • Migraine    • Schizoaffective disorder (CMS/HCC)        Allergies   Allergen Reactions   • Parabens Anaphylaxis   • Tea Tree Oil Anaphylaxis       Past Surgical History:   Procedure Laterality Date   • COLONOSCOPY     • DENTAL PROCEDURE     • EAR TUBES Bilateral     x2 times   • ENDOSCOPY     • TONSILLECTOMY         Family History   Problem Relation Age of Onset   • Hypertension Mother    • Migraines Mother    • Osteoarthritis Mother    • Hyperlipidemia Father    • Hypertension Father        Social History     Socioeconomic  "History   • Marital status:      Spouse name: Not on file   • Number of children: Not on file   • Years of education: Not on file   • Highest education level: Not on file   Substance and Sexual Activity   • Alcohol use: No     Frequency: Never   • Drug use: No       /77   Pulse 93   Temp 97.7 °F (36.5 °C) (Oral)   Resp 15   Ht 160 cm (63\")   Wt 79.2 kg (174 lb 9.7 oz)   SpO2 100%   BMI 30.93 kg/m²       Objective   Physical Exam   Constitutional: She is oriented to person, place, and time. She appears well-developed and well-nourished.   HENT:   Head: Normocephalic and atraumatic.   Eyes: EOM are normal. Pupils are equal, round, and reactive to light.   Neck: Normal range of motion. Neck supple.   Cardiovascular: Normal rate, regular rhythm and normal heart sounds.   Pulmonary/Chest: Effort normal and breath sounds normal.   Abdominal: Soft. Bowel sounds are normal. There is no tenderness.   Musculoskeletal:   Mild tenderness to the left wrist with no visible injury or deformity, neurovascular intact distally   Neurological: She is alert and oriented to person, place, and time.   Skin: Skin is warm and dry.   Nursing note and vitals reviewed.      Procedures           ED Course      My interpretation of EKG shows sinus rhythm, rate of 91, no ST elevation, artifact present.    Results for orders placed or performed during the hospital encounter of 08/12/19   Comprehensive Metabolic Panel   Result Value Ref Range    Glucose 116 (H) 65 - 99 mg/dL    BUN 12 8 - 20 mg/dL    Creatinine 1.00 0.40 - 1.00 mg/dL    Sodium 133 (L) 136 - 144 mmol/L    Potassium 3.4 (L) 3.6 - 5.1 mmol/L    Chloride 108 101 - 111 mmol/L    CO2 15.0 (L) 22.0 - 32.0 mmol/L    Calcium 8.6 (L) 8.9 - 10.3 mg/dL    Total Protein 6.6 6.1 - 7.9 g/dL    Albumin 4.10 3.50 - 4.80 g/dL    ALT (SGPT) 17 14 - 54 U/L    AST (SGOT) 35 15 - 41 U/L    Alkaline Phosphatase 68 32 - 91 U/L    Total Bilirubin 1.1 0.3 - 1.2 mg/dL    eGFR Non "  Amer 63 >60 mL/min/1.73    Globulin 2.5 2.5 - 3.8 gm/dL    A/G Ratio 1.6 1.0 - 1.7 g/dL    BUN/Creatinine Ratio 12.0 5.4 - 26.2    Anion Gap 13.4 5.0 - 15.0 mmol/L   Protime-INR   Result Value Ref Range    Protime 10.2 9.6 - 11.7 Seconds    INR 0.99 0.90 - 1.10   aPTT   Result Value Ref Range    PTT 18.3 (L) 24.0 - 31.0 seconds   Troponin   Result Value Ref Range    Troponin I <0.030 0.000 - 0.030 ng/mL   CBC Auto Differential   Result Value Ref Range    WBC 8.20 3.40 - 10.80 10*3/mm3    RBC 4.42 3.77 - 5.28 10*6/mm3    Hemoglobin 12.1 12.0 - 15.9 g/dL    Hematocrit 36.1 34.0 - 46.6 %    MCV 81.7 79.0 - 97.0 fL    MCH 27.5 26.6 - 33.0 pg    MCHC 33.7 31.5 - 35.7 g/dL    RDW 17.4 (H) 12.3 - 15.4 %    RDW-SD 50.3 37.0 - 54.0 fl    MPV 10.2 6.0 - 12.0 fL    Platelets 243 140 - 450 10*3/mm3   Scan Slide   Result Value Ref Range    Scan Slide     Manual Differential   Result Value Ref Range    Neutrophil % 72.0 42.7 - 76.0 %    Lymphocyte % 20.0 19.6 - 45.3 %    Monocyte % 6.0 5.0 - 12.0 %    Eosinophil % 2.0 0.3 - 6.2 %    Neutrophils Absolute 5.90 1.70 - 7.00 10*3/mm3    Lymphocytes Absolute 1.64 0.70 - 3.10 10*3/mm3    Monocytes Absolute 0.49 0.10 - 0.90 10*3/mm3    Eosinophils Absolute 0.16 0.00 - 0.40 10*3/mm3    [place] Cells Mod/2+ None Seen    Ovalocytes Large/3+ None Seen    WBC Morphology Normal Normal    Platelet Morphology Normal Normal     Xr Wrist 3+ View Left    Result Date: 8/12/2019  Normal study.  Electronically Signed By-Rob Gordillo On:8/12/2019 12:24 PM This report was finalized on [id number] by  Rob Gordillo, .    Ct Head Without Contrast    Result Date: 8/12/2019  No acute intracranial abnormality. Specifically, no evidence of hemorrhage, mass effect or midline shift  Electronically Signed By-Harmeet Fields On:8/12/2019 12:06 PM This report was finalized on [id number] by  Harmeet Fields, .    Xr Chest 1 View    Result Date: 8/12/2019  No active disease  Electronically Signed ByCalvin  Silvia On:8/12/2019 12:21 PM This report was finalized on [id number] by  Wilmer Vega, .            MDM  Had the above evaluation.  Results were discussed with the patient.  Patient remained well-appearing in the emergency room.  Work-up is been unremarkable.  Chest x-ray shows no acute disease.  EKG shows no acute ischemia.  Troponin is negative.  Patient also had a CT of her head which was unremarkable.  X-ray of the left wrist showed no fracture or dislocation.  I called and discussed with Dr. Allred who states the patient had an extensive work-up in November for syncopal episodes.  He is okay with the patient being discharged with a Holter monitor and she will follow-up in the office.  Patient is agreeable to this plan.    Final diagnoses:   Syncope, unspecified syncope type   Contusion of left wrist, initial encounter            Kelby Aguirre MD  08/12/19 150

## 2019-08-14 ENCOUNTER — APPOINTMENT (OUTPATIENT)
Dept: GENERAL RADIOLOGY | Facility: HOSPITAL | Age: 34
End: 2019-08-14

## 2019-08-14 ENCOUNTER — HOSPITAL ENCOUNTER (EMERGENCY)
Facility: HOSPITAL | Age: 34
Discharge: HOME | End: 2019-08-14
Attending: PHYSICIAN ASSISTANT | Admitting: EMERGENCY MEDICINE

## 2019-08-14 ENCOUNTER — HOSPITAL ENCOUNTER (OUTPATIENT)
Facility: HOSPITAL | Age: 34
Setting detail: OBSERVATION
Discharge: HOME | End: 2019-08-16
Attending: INTERNAL MEDICINE | Admitting: INTERNAL MEDICINE

## 2019-08-14 VITALS
BODY MASS INDEX: 28.65 KG/M2 | SYSTOLIC BLOOD PRESSURE: 109 MMHG | HEIGHT: 65 IN | TEMPERATURE: 97.7 F | OXYGEN SATURATION: 100 % | HEART RATE: 96 BPM | WEIGHT: 171.96 LBS | DIASTOLIC BLOOD PRESSURE: 68 MMHG | RESPIRATION RATE: 16 BRPM

## 2019-08-14 DIAGNOSIS — R07.9 CHEST PAIN, UNSPECIFIED TYPE: Primary | ICD-10-CM

## 2019-08-14 LAB
ALBUMIN SERPL-MCNC: 4.4 G/DL (ref 3.5–4.8)
ALBUMIN/GLOB SERPL: 1.6 G/DL (ref 1–1.7)
ALP SERPL-CCNC: 72 U/L (ref 32–91)
ALT SERPL W P-5'-P-CCNC: 20 U/L (ref 14–54)
ANION GAP SERPL CALCULATED.3IONS-SCNC: 15.3 MMOL/L (ref 5–15)
AST SERPL-CCNC: 23 U/L (ref 15–41)
BASOPHILS # BLD AUTO: 0 10*3/MM3 (ref 0–0.2)
BASOPHILS NFR BLD AUTO: 0.5 % (ref 0–1.5)
BILIRUB SERPL-MCNC: 1.2 MG/DL (ref 0.3–1.2)
BUN BLD-MCNC: 8 MG/DL (ref 8–20)
BUN/CREAT SERPL: 7.3 (ref 5.4–26.2)
CALCIUM SPEC-SCNC: 9 MG/DL (ref 8.9–10.3)
CHLORIDE SERPL-SCNC: 106 MMOL/L (ref 101–111)
CO2 SERPL-SCNC: 18 MMOL/L (ref 22–32)
CREAT BLD-MCNC: 1.1 MG/DL (ref 0.4–1)
D DIMER PPP FEU-MCNC: 0.17 MCGFEU/ML (ref 0.17–0.59)
DEPRECATED RDW RBC AUTO: 50.3 FL (ref 37–54)
EOSINOPHIL # BLD AUTO: 0 10*3/MM3 (ref 0–0.4)
EOSINOPHIL NFR BLD AUTO: 0.6 % (ref 0.3–6.2)
ERYTHROCYTE [DISTWIDTH] IN BLOOD BY AUTOMATED COUNT: 17.5 % (ref 12.3–15.4)
GFR SERPL CREATININE-BSD FRML MDRD: 56 ML/MIN/1.73
GLOBULIN UR ELPH-MCNC: 2.8 GM/DL (ref 2.5–3.8)
GLUCOSE BLD-MCNC: 89 MG/DL (ref 65–99)
HCT VFR BLD AUTO: 38.3 % (ref 34–46.6)
HGB BLD-MCNC: 13 G/DL (ref 12–15.9)
LYMPHOCYTES # BLD AUTO: 1.5 10*3/MM3 (ref 0.7–3.1)
LYMPHOCYTES NFR BLD AUTO: 18.8 % (ref 19.6–45.3)
MCH RBC QN AUTO: 27.6 PG (ref 26.6–33)
MCHC RBC AUTO-ENTMCNC: 34 G/DL (ref 31.5–35.7)
MCV RBC AUTO: 81.2 FL (ref 79–97)
MONOCYTES # BLD AUTO: 0.4 10*3/MM3 (ref 0.1–0.9)
MONOCYTES NFR BLD AUTO: 4.7 % (ref 5–12)
NEUTROPHILS # BLD AUTO: 5.9 10*3/MM3 (ref 1.7–7)
NEUTROPHILS NFR BLD AUTO: 75.4 % (ref 42.7–76)
NRBC BLD AUTO-RTO: 0.1 /100 WBC (ref 0–0.2)
PLATELET # BLD AUTO: 273 10*3/MM3 (ref 140–450)
PMV BLD AUTO: 9.7 FL (ref 6–12)
POTASSIUM BLD-SCNC: 3.3 MMOL/L (ref 3.6–5.1)
PROT SERPL-MCNC: 7.2 G/DL (ref 6.1–7.9)
RBC # BLD AUTO: 4.72 10*6/MM3 (ref 3.77–5.28)
SODIUM BLD-SCNC: 136 MMOL/L (ref 136–144)
TROPONIN I SERPL-MCNC: <0.03 NG/ML (ref 0–0.03)
TROPONIN I SERPL-MCNC: <0.03 NG/ML (ref 0–0.03)
WBC NRBC COR # BLD: 7.8 10*3/MM3 (ref 3.4–10.8)

## 2019-08-14 PROCEDURE — 71045 X-RAY EXAM CHEST 1 VIEW: CPT

## 2019-08-14 PROCEDURE — 99218 PR INITIAL OBSERVATION CARE/DAY 30 MINUTES: CPT | Performed by: NURSE PRACTITIONER

## 2019-08-14 PROCEDURE — 93005 ELECTROCARDIOGRAM TRACING: CPT | Performed by: PHYSICIAN ASSISTANT

## 2019-08-14 PROCEDURE — 85379 FIBRIN DEGRADATION QUANT: CPT | Performed by: PHYSICIAN ASSISTANT

## 2019-08-14 PROCEDURE — 85025 COMPLETE CBC W/AUTO DIFF WBC: CPT | Performed by: PHYSICIAN ASSISTANT

## 2019-08-14 PROCEDURE — 84484 ASSAY OF TROPONIN QUANT: CPT | Performed by: NURSE PRACTITIONER

## 2019-08-14 PROCEDURE — 84439 ASSAY OF FREE THYROXINE: CPT | Performed by: NURSE PRACTITIONER

## 2019-08-14 PROCEDURE — 84484 ASSAY OF TROPONIN QUANT: CPT | Performed by: PHYSICIAN ASSISTANT

## 2019-08-14 PROCEDURE — 84481 FREE ASSAY (FT-3): CPT | Performed by: NURSE PRACTITIONER

## 2019-08-14 PROCEDURE — 93005 ELECTROCARDIOGRAM TRACING: CPT

## 2019-08-14 PROCEDURE — 96361 HYDRATE IV INFUSION ADD-ON: CPT

## 2019-08-14 PROCEDURE — 25010000002 LORAZEPAM PER 2 MG

## 2019-08-14 PROCEDURE — 84443 ASSAY THYROID STIM HORMONE: CPT | Performed by: NURSE PRACTITIONER

## 2019-08-14 PROCEDURE — 99284 EMERGENCY DEPT VISIT MOD MDM: CPT

## 2019-08-14 PROCEDURE — G0378 HOSPITAL OBSERVATION PER HR: HCPCS

## 2019-08-14 PROCEDURE — 80053 COMPREHEN METABOLIC PANEL: CPT | Performed by: PHYSICIAN ASSISTANT

## 2019-08-14 PROCEDURE — 93005 ELECTROCARDIOGRAM TRACING: CPT | Performed by: INTERNAL MEDICINE

## 2019-08-14 PROCEDURE — 96374 THER/PROPH/DIAG INJ IV PUSH: CPT

## 2019-08-14 RX ORDER — ERGOCALCIFEROL (VITAMIN D2) 10 MCG
2000 TABLET ORAL DAILY
COMMUNITY
End: 2020-03-16

## 2019-08-14 RX ORDER — MULTIPLE VITAMINS W/ MINERALS TAB 9MG-400MCG
1 TAB ORAL DAILY
COMMUNITY
End: 2022-04-14

## 2019-08-14 RX ORDER — FLUOXETINE HYDROCHLORIDE 20 MG/1
60 CAPSULE ORAL EVERY MORNING
COMMUNITY
End: 2020-09-09 | Stop reason: ALTCHOICE

## 2019-08-14 RX ORDER — PANTOPRAZOLE SODIUM 40 MG/1
40 TABLET, DELAYED RELEASE ORAL EVERY MORNING
Status: DISCONTINUED | OUTPATIENT
Start: 2019-08-15 | End: 2019-08-16 | Stop reason: HOSPADM

## 2019-08-14 RX ORDER — LORAZEPAM 0.5 MG/1
0.5 TABLET ORAL NIGHTLY
Status: DISCONTINUED | OUTPATIENT
Start: 2019-08-14 | End: 2019-08-16 | Stop reason: HOSPADM

## 2019-08-14 RX ORDER — LORAZEPAM 2 MG/ML
INJECTION INTRAMUSCULAR
Status: COMPLETED
Start: 2019-08-14 | End: 2019-08-14

## 2019-08-14 RX ORDER — SODIUM CHLORIDE 0.9 % (FLUSH) 0.9 %
3-10 SYRINGE (ML) INJECTION AS NEEDED
Status: DISCONTINUED | OUTPATIENT
Start: 2019-08-14 | End: 2019-08-16 | Stop reason: HOSPADM

## 2019-08-14 RX ORDER — ONDANSETRON 4 MG/1
4 TABLET, FILM COATED ORAL EVERY 6 HOURS PRN
Status: DISCONTINUED | OUTPATIENT
Start: 2019-08-14 | End: 2019-08-16 | Stop reason: HOSPADM

## 2019-08-14 RX ORDER — ZOLPIDEM TARTRATE 5 MG/1
5 TABLET ORAL NIGHTLY
Status: DISCONTINUED | OUTPATIENT
Start: 2019-08-14 | End: 2019-08-16 | Stop reason: HOSPADM

## 2019-08-14 RX ORDER — ARIPIPRAZOLE 10 MG/1
30 TABLET ORAL EVERY MORNING
Status: DISCONTINUED | OUTPATIENT
Start: 2019-08-15 | End: 2019-08-16 | Stop reason: HOSPADM

## 2019-08-14 RX ORDER — ASPIRIN 81 MG/1
324 TABLET, CHEWABLE ORAL ONCE
Status: COMPLETED | OUTPATIENT
Start: 2019-08-14 | End: 2019-08-14

## 2019-08-14 RX ORDER — CETIRIZINE HYDROCHLORIDE 10 MG/1
10 TABLET ORAL 2 TIMES DAILY
Status: DISCONTINUED | OUTPATIENT
Start: 2019-08-14 | End: 2019-08-16 | Stop reason: HOSPADM

## 2019-08-14 RX ORDER — LORAZEPAM 2 MG/ML
1 INJECTION INTRAMUSCULAR ONCE
Status: COMPLETED | OUTPATIENT
Start: 2019-08-14 | End: 2019-08-14

## 2019-08-14 RX ORDER — POTASSIUM CHLORIDE 20 MEQ/1
40 TABLET, EXTENDED RELEASE ORAL AS NEEDED
Status: DISCONTINUED | OUTPATIENT
Start: 2019-08-14 | End: 2019-08-16 | Stop reason: HOSPADM

## 2019-08-14 RX ORDER — CETIRIZINE HYDROCHLORIDE 10 MG/1
10 TABLET ORAL 2 TIMES DAILY
COMMUNITY
End: 2021-09-01

## 2019-08-14 RX ORDER — POTASSIUM CHLORIDE 1.5 G/1.77G
40 POWDER, FOR SOLUTION ORAL AS NEEDED
Status: DISCONTINUED | OUTPATIENT
Start: 2019-08-14 | End: 2019-08-16 | Stop reason: HOSPADM

## 2019-08-14 RX ORDER — FLUOXETINE HYDROCHLORIDE 20 MG/1
60 CAPSULE ORAL EVERY MORNING
Status: DISCONTINUED | OUTPATIENT
Start: 2019-08-15 | End: 2019-08-16 | Stop reason: HOSPADM

## 2019-08-14 RX ORDER — OMEPRAZOLE 40 MG/1
40 CAPSULE, DELAYED RELEASE ORAL
COMMUNITY
End: 2021-04-12

## 2019-08-14 RX ORDER — SODIUM CHLORIDE 0.9 % (FLUSH) 0.9 %
10 SYRINGE (ML) INJECTION AS NEEDED
Status: DISCONTINUED | OUTPATIENT
Start: 2019-08-14 | End: 2019-08-14 | Stop reason: HOSPADM

## 2019-08-14 RX ORDER — SODIUM CHLORIDE 9 MG/ML
100 INJECTION, SOLUTION INTRAVENOUS CONTINUOUS
Status: DISCONTINUED | OUTPATIENT
Start: 2019-08-14 | End: 2019-08-15

## 2019-08-14 RX ORDER — SODIUM CHLORIDE 0.9 % (FLUSH) 0.9 %
3 SYRINGE (ML) INJECTION EVERY 12 HOURS SCHEDULED
Status: DISCONTINUED | OUTPATIENT
Start: 2019-08-14 | End: 2019-08-16 | Stop reason: HOSPADM

## 2019-08-14 RX ORDER — MELATONIN
2000 DAILY
Status: DISCONTINUED | OUTPATIENT
Start: 2019-08-15 | End: 2019-08-16 | Stop reason: HOSPADM

## 2019-08-14 RX ORDER — QUETIAPINE FUMARATE 100 MG/1
200 TABLET, FILM COATED ORAL NIGHTLY
Status: DISCONTINUED | OUTPATIENT
Start: 2019-08-14 | End: 2019-08-16 | Stop reason: HOSPADM

## 2019-08-14 RX ORDER — LEVOTHYROXINE SODIUM 0.05 MG/1
50 TABLET ORAL
Status: DISCONTINUED | OUTPATIENT
Start: 2019-08-15 | End: 2019-08-16 | Stop reason: HOSPADM

## 2019-08-14 RX ORDER — LORAZEPAM 0.5 MG/1
0.5 TABLET ORAL
COMMUNITY
End: 2019-09-11

## 2019-08-14 RX ORDER — TOPIRAMATE 100 MG/1
200 TABLET, FILM COATED ORAL NIGHTLY
Status: DISCONTINUED | OUTPATIENT
Start: 2019-08-14 | End: 2019-08-16 | Stop reason: HOSPADM

## 2019-08-14 RX ADMIN — SODIUM CHLORIDE 100 ML/HR: 900 INJECTION, SOLUTION INTRAVENOUS at 23:15

## 2019-08-14 RX ADMIN — QUETIAPINE 200 MG: 100 TABLET, FILM COATED ORAL at 23:16

## 2019-08-14 RX ADMIN — Medication 10 ML: at 10:46

## 2019-08-14 RX ADMIN — ASPIRIN 81 MG 324 MG: 81 TABLET ORAL at 10:47

## 2019-08-14 RX ADMIN — NITROGLYCERIN 1 INCH: 20 OINTMENT TOPICAL at 10:46

## 2019-08-14 RX ADMIN — TOPIRAMATE 200 MG: 100 TABLET, FILM COATED ORAL at 23:16

## 2019-08-14 RX ADMIN — LORAZEPAM 1 MG: 2 INJECTION INTRAMUSCULAR at 20:21

## 2019-08-14 RX ADMIN — Medication 3 ML: at 23:15

## 2019-08-14 RX ADMIN — SODIUM CHLORIDE 500 ML: 900 INJECTION, SOLUTION INTRAVENOUS at 10:45

## 2019-08-14 RX ADMIN — LORAZEPAM 1 MG: 2 INJECTION INTRAMUSCULAR; INTRAVENOUS at 20:21

## 2019-08-14 RX ADMIN — POTASSIUM CHLORIDE 40 MEQ: 20 TABLET, EXTENDED RELEASE ORAL at 23:16

## 2019-08-14 NOTE — ED PROVIDER NOTES
Subjective   Patient was here earlier today with chest pain radiating into her left arm he had a negative cardiac work-up and was discharged home.  Just prior to arrival this evening she was taking a nap and was awoken by a more intense left-sided chest pain radiating into her left arm.  She states the pain lasts for about 10 minutes and then resolves about every 20 minutes it recurs.  No shortness of breath no diaphoresis.  She is currently pain-free            Review of Systems   Constitutional: Negative for fever.   Respiratory: Negative for shortness of breath.    Cardiovascular: Positive for chest pain. Negative for palpitations and leg swelling.   Psychiatric/Behavioral: The patient is nervous/anxious.        Past Medical History:   Diagnosis Date   • Anxiety    • Arrhythmia    • Asthma    • Depression    • Disease of thyroid gland    • GERD (gastroesophageal reflux disease)    • Migraine    • Schizoaffective disorder (CMS/HCC)        Allergies   Allergen Reactions   • Parabens Anaphylaxis   • Tea Tree Oil Anaphylaxis       Past Surgical History:   Procedure Laterality Date   • COLONOSCOPY     • DENTAL PROCEDURE     • EAR TUBES Bilateral     x2 times   • ENDOSCOPY     • TONSILLECTOMY         Family History   Problem Relation Age of Onset   • Hypertension Mother    • Migraines Mother    • Osteoarthritis Mother    • Hyperlipidemia Father    • Hypertension Father        Social History     Socioeconomic History   • Marital status:      Spouse name: Not on file   • Number of children: Not on file   • Years of education: Not on file   • Highest education level: Not on file   Substance and Sexual Activity   • Alcohol use: No     Frequency: Never   • Drug use: No           Objective   Physical Exam  34-year-old woman sitting up in the bed awake alert no acute distress talking clear voice in full sentences on examination pharynx is clear airway patent mouth is moist neck is supple no JVD or bruit breath sounds  clear and equal bilaterally heart sounds S1-S2 no discernible murmur abdomen is soft nontender back has no CVA tenderness legs have no swelling no tenderness no evidence of DVT  Procedures           ED Course      Results for orders placed or performed during the hospital encounter of 08/14/19   Troponin   Result Value Ref Range    Troponin I <0.030 0.000 - 0.030 ng/mL     Patient had unremarkable lab work this morning a repeat troponin is negative this evening.  While in the emergency room patient had another episode of left-sided chest pain that lasted about 5 minutes.  Patient became quite anxious and was hyperventilating.  Pain resolved.  Patient was given Ativan for her anxiety.  Physical examination and test results were discussed with Dr. Allred who declined to admit the patient.  Patient became very upset and stated that she wished to fire her doctor and be admitted to the hospitalist service.  Discussed the situation with Dr. Aguirre.  Physical examination and test results were discussed with the hospitalist service Jade excepted for Dr. Arnold patient had no further episodes of chest pain in the emergency room              MDM      Final diagnoses:   Chest pain, unspecified type            Nick Edwards, FRANCO  08/18/19 4002

## 2019-08-14 NOTE — DISCHARGE INSTRUCTIONS
Return to the ER for any worsening symptoms.  Follow Up with your primary care doctor today for further management.

## 2019-08-14 NOTE — ED NOTES
Pt reports she has had chest pain and nausea since this morning. Seen in ED for evaluation this morning. Reports symptoms are getting worse      Jovita Uribe LPN  08/14/19 1930

## 2019-08-14 NOTE — ED PROVIDER NOTES
Subjective   History:  34-year-old female who presents to the ER with neck pain that radiates to her left arm and chest pain that began this morning.  Slightly decreased in the ER.  1 day history.  She was seen here 2 days ago for syncopal episode she was placed on Holter monitor at that time she turned that and yesterday she has follow-up next week with cardiology.  Patient has had an extensive syncopal work-up with her primary care doctor.    Onset: 1 day  Location: Chest and left arm  Duration: Improving  Character: Sharp pain  Aggravating/Alleviating factors: None  Radiation none  Severity: Moderate              Review of Systems   Constitutional: Negative.    HENT: Negative.    Respiratory: Positive for chest tightness.    Cardiovascular: Positive for chest pain.   Gastrointestinal: Negative.    Genitourinary: Negative.    Musculoskeletal: Negative.    Skin: Negative.    Neurological: Negative.    Psychiatric/Behavioral: Negative.        Past Medical History:   Diagnosis Date   • Anxiety    • Arrhythmia    • Asthma    • Depression    • Disease of thyroid gland    • GERD (gastroesophageal reflux disease)    • Migraine    • Schizoaffective disorder (CMS/HCC)        Allergies   Allergen Reactions   • Parabens Anaphylaxis   • Tea Tree Oil Anaphylaxis       Past Surgical History:   Procedure Laterality Date   • COLONOSCOPY     • DENTAL PROCEDURE     • EAR TUBES Bilateral     x2 times   • ENDOSCOPY     • TONSILLECTOMY         Family History   Problem Relation Age of Onset   • Hypertension Mother    • Migraines Mother    • Osteoarthritis Mother    • Hyperlipidemia Father    • Hypertension Father        Social History     Socioeconomic History   • Marital status:      Spouse name: Not on file   • Number of children: Not on file   • Years of education: Not on file   • Highest education level: Not on file   Substance and Sexual Activity   • Alcohol use: No     Frequency: Never   • Drug use: No           Objective    Physical Exam   Constitutional: She is oriented to person, place, and time. She appears well-developed and well-nourished.   HENT:   Head: Normocephalic and atraumatic.   Eyes: Pupils are equal, round, and reactive to light.   Neck: Normal range of motion.   Cardiovascular: Regular rhythm. Tachycardia present.   Pulmonary/Chest: Effort normal and breath sounds normal.   Musculoskeletal: Normal range of motion.   Neurological: She is alert and oriented to person, place, and time.   Skin: Skin is warm and dry.   Psychiatric: She has a normal mood and affect. Her behavior is normal.       Procedures           ED Course      Xr Chest 1 View    Result Date: 8/14/2019  No acute process.  Electronically Signed By-Chito Bynum On:8/14/2019 10:57 AM This report was finalized on [id number] by  Chito Bynum, .    Labs Reviewed   COMPREHENSIVE METABOLIC PANEL - Abnormal; Notable for the following components:       Result Value    Creatinine 1.10 (*)     Potassium 3.3 (*)     CO2 18.0 (*)     eGFR Non  Amer 56 (*)     Anion Gap 15.3 (*)     All other components within normal limits   CBC WITH AUTO DIFFERENTIAL - Abnormal; Notable for the following components:    RDW 17.5 (*)     Lymphocyte % 18.8 (*)     Monocyte % 4.7 (*)     All other components within normal limits   D-DIMER, QUANTITATIVE - Normal    Narrative:     Reference Range  --------------------------------------------------------------------     < 0.50   Negative Predictive Value  0.50-0.59   Indeterminate    >= 0.60   Probable VTE             A very low percentage of patients with DVT may yield D-Dimer results   below the cut-off of 0.50 MCGFEU/mL.  This is known to be more   prevalent in patients with distal DVT.             Results of this test should always be interpreted in conjunction with   the patient's medical history, clinical presentation and other   findings.  Clinical diagnosis should not be based on the result of   [place] D-Dimer alone.    TROPONIN (IN-HOUSE) - Normal    Narrative:     Troponin I Reference Range:    0.00-0.03  Negative.  Repeat testing in 4-6 hours if clinically indicated.    0.04-0.29  Suspicious for myocardial injury. Serial measurements and clinical  correlation may be necessary to confirm or exclude diagnosis of acute  coronary syndrome.  Repeat testing in 4-6 hours if indicated.     >0.29 Consistent with myocardial injury.  Recommend clinical and laboratory correlation.     Results my be falsely decreased if patient taking Biotin.    CBC AND DIFFERENTIAL    Narrative:     The following orders were created for panel order CBC & Differential.  Procedure                               Abnormality         Status                     ---------                               -----------         ------                     CBC Auto Differential[[id number]]        Abnormal            Final result                 Please view results for these tests on the individual orders.     Medications   sodium chloride 0.9 % flush 10 mL (10 mL Intravenous Given 8/14/19 1046)   sodium chloride 0.9 % bolus 500 mL (500 mL Intravenous New Bag 8/14/19 1045)   nitroglycerin (NITROSTAT) ointment 1 inch (1 inch Topical Given 8/14/19 1046)   aspirin chewable tablet 324 mg (324 mg Oral Given 8/14/19 1047)               MDM  Number of Diagnoses or Management Options  Chest pain, unspecified type:   Diagnosis management comments: DISPOSITION:   Chart Review:  Comorbidity:  has a past medical history of Anxiety, Arrhythmia, Asthma, Depression, Disease of thyroid gland, GERD (gastroesophageal reflux disease), Migraine, and Schizoaffective disorder (CMS/[facility]).  Differentials:this list is not all inclusive and does not constitute the entirety of considered causes --> cardiac etiology, anxiety, pulmonary etiology  ECG: interpreted by ER physician and reviewed by myself:  Sinus rhythm no significant change from previous 2 days ago   Labs: CBC - WBC/Hgb/Hct/Plts:  --/13.0/38.3/-- (08/14 1042) LYTES - Na/K/Cl/CO2: 136/3.3*/106/18.0* (08/14 1041) CHEM - BUN/Cr/glu/ALT/AST/amyl/lip:8/--/--/20/23/--/-- (08/14 1041)   .rr    Imaging: Was interpreted by physician and reviewed by myself:  Xr Chest 1 View    Result Date: 8/14/2019  No acute process.  Electronically Signed By-Chito Bynum On:8/14/2019 10:57 AM This report was finalized on [id number] by  Chito Bynum, .      Disposition/Treatment:  Patient is a 34-year-old female who presents to the ER with chest pain resolving.  Patient was given aspirin.  Patient's chest pain was much better at time of discharge.  I spoke with her primary care doctor who felt comfortable with her following up.  Patient had a Holter monitor that she just turned in yesterday.  I did report to patient that if she had any return of symptoms or worsening symptoms to come back for possible admittance but at this time I believe that due to her low heart score risk she can follow-up in the outpatient setting she was stable and in agreement with plan.       Amount and/or Complexity of Data Reviewed  Clinical lab tests: reviewed  Tests in the radiology section of CPT®: reviewed  Tests in the medicine section of CPT®: reviewed    Patient Progress  Patient progress: stable        Final diagnoses:   Chest pain, unspecified type            Dinorah Lagos PA-C  08/14/19 5905

## 2019-08-15 ENCOUNTER — APPOINTMENT (OUTPATIENT)
Dept: NUCLEAR MEDICINE | Facility: HOSPITAL | Age: 34
End: 2019-08-15

## 2019-08-15 ENCOUNTER — TELEPHONE (OUTPATIENT)
Dept: FAMILY MEDICINE CLINIC | Facility: CLINIC | Age: 34
End: 2019-08-15

## 2019-08-15 PROBLEM — K21.9 GASTROESOPHAGEAL REFLUX DISEASE: Status: ACTIVE | Noted: 2017-03-28

## 2019-08-15 PROBLEM — F32.A DEPRESSION: Chronic | Status: ACTIVE | Noted: 2017-03-28

## 2019-08-15 PROBLEM — E03.9 HYPOTHYROIDISM: Status: ACTIVE | Noted: 2017-03-28

## 2019-08-15 PROBLEM — E66.9 OBESITY: Chronic | Status: ACTIVE | Noted: 2017-03-28

## 2019-08-15 PROBLEM — F32.A DEPRESSION: Status: ACTIVE | Noted: 2017-03-28

## 2019-08-15 PROBLEM — E66.9 OBESITY: Status: ACTIVE | Noted: 2017-03-28

## 2019-08-15 PROBLEM — E03.9 HYPOTHYROIDISM: Chronic | Status: ACTIVE | Noted: 2017-03-28

## 2019-08-15 PROBLEM — F31.60 BIPOLAR 1 DISORDER, MIXED: Status: ACTIVE | Noted: 2017-03-28

## 2019-08-15 PROBLEM — F31.60 BIPOLAR 1 DISORDER, MIXED (HCC): Chronic | Status: ACTIVE | Noted: 2017-03-28

## 2019-08-15 PROBLEM — K21.9 GASTROESOPHAGEAL REFLUX DISEASE: Chronic | Status: ACTIVE | Noted: 2017-03-28

## 2019-08-15 LAB
POTASSIUM BLD-SCNC: 4.6 MMOL/L (ref 3.6–5.1)
T3FREE SERPL-MCNC: 3.59 PG/ML (ref 2.39–6.79)
T4 FREE SERPL-MCNC: 1.07 NG/DL (ref 0.58–1.64)
TROPONIN I SERPL-MCNC: <0.03 NG/ML (ref 0–0.03)
TROPONIN I SERPL-MCNC: <0.03 NG/ML (ref 0–0.03)
TSH SERPL DL<=0.05 MIU/L-ACNC: 1.63 MIU/ML (ref 0.34–5.6)

## 2019-08-15 PROCEDURE — 94799 UNLISTED PULMONARY SVC/PX: CPT

## 2019-08-15 PROCEDURE — 99225 PR SBSQ OBSERVATION CARE/DAY 25 MINUTES: CPT | Performed by: INTERNAL MEDICINE

## 2019-08-15 PROCEDURE — G0378 HOSPITAL OBSERVATION PER HR: HCPCS

## 2019-08-15 PROCEDURE — 96361 HYDRATE IV INFUSION ADD-ON: CPT

## 2019-08-15 PROCEDURE — 93017 CV STRESS TEST TRACING ONLY: CPT

## 2019-08-15 PROCEDURE — A9500 TC99M SESTAMIBI: HCPCS | Performed by: INTERNAL MEDICINE

## 2019-08-15 PROCEDURE — 0 TECHNETIUM SESTAMIBI: Performed by: INTERNAL MEDICINE

## 2019-08-15 PROCEDURE — 78452 HT MUSCLE IMAGE SPECT MULT: CPT

## 2019-08-15 PROCEDURE — 84132 ASSAY OF SERUM POTASSIUM: CPT | Performed by: INTERNAL MEDICINE

## 2019-08-15 PROCEDURE — 93016 CV STRESS TEST SUPVJ ONLY: CPT | Performed by: NURSE PRACTITIONER

## 2019-08-15 PROCEDURE — 84484 ASSAY OF TROPONIN QUANT: CPT | Performed by: NURSE PRACTITIONER

## 2019-08-15 RX ADMIN — PANTOPRAZOLE SODIUM 40 MG: 40 TABLET, DELAYED RELEASE ORAL at 08:09

## 2019-08-15 RX ADMIN — POTASSIUM CHLORIDE 40 MEQ: 20 TABLET, EXTENDED RELEASE ORAL at 02:23

## 2019-08-15 RX ADMIN — MELATONIN 2000 UNITS: at 08:09

## 2019-08-15 RX ADMIN — TECHNETIUM TC 99M SESTAMIBI 1 DOSE: 1 INJECTION INTRAVENOUS at 11:00

## 2019-08-15 RX ADMIN — LEVOTHYROXINE SODIUM 50 MCG: 50 TABLET ORAL at 06:17

## 2019-08-15 RX ADMIN — Medication 3 ML: at 21:51

## 2019-08-15 RX ADMIN — QUETIAPINE 200 MG: 100 TABLET, FILM COATED ORAL at 21:09

## 2019-08-15 RX ADMIN — CETIRIZINE HYDROCHLORIDE 10 MG: 10 TABLET, FILM COATED ORAL at 08:09

## 2019-08-15 RX ADMIN — LORAZEPAM 0.5 MG: 0.5 TABLET ORAL at 21:09

## 2019-08-15 RX ADMIN — FLUOXETINE 60 MG: 20 CAPSULE ORAL at 08:09

## 2019-08-15 RX ADMIN — TOPIRAMATE 200 MG: 100 TABLET, FILM COATED ORAL at 21:09

## 2019-08-15 RX ADMIN — SODIUM CHLORIDE 100 ML/HR: 900 INJECTION, SOLUTION INTRAVENOUS at 08:09

## 2019-08-15 RX ADMIN — ARIPIPRAZOLE 30 MG: 10 TABLET ORAL at 08:11

## 2019-08-15 RX ADMIN — CETIRIZINE HYDROCHLORIDE 10 MG: 10 TABLET, FILM COATED ORAL at 21:10

## 2019-08-15 NOTE — TELEPHONE ENCOUNTER
I spoke with the patient.  She was admitted to the hospital.  I thought she had been sent home.  She is to follow up with me when she gets out of the hospital.

## 2019-08-15 NOTE — H&P
"[facility] HOSPITALIST     Erlin Allred Jr., MD    CHIEF COMPLAINT:     Chest pain    HISTORY OF PRESENT ILLNESS:    Ms. Concepcion is a 34 year old old  female with a past medical history significant for Bipolar disorder, Tourette's, chronic migraines, GERD, and hypothyroidism.    She reports to the ER for the second time this week.  She was seen Monday for complaints of syncope and heart palpitations.  She was placed on a Holter Monitor and returned it yesterday.  She states that she passed out at work on Monday.  She states she had no problems Tuesday and was out shopping and walking.  She then returned to work today where she states she began having significant chest pain with radiation to her left arm, back, and neck.  She describes it as an ache. She denies nausea, dyspnea, or dizziness.  While in the ER, she was called to her PCP, Dr. Allred who requested she be discharged from the ER.  She proceeded to tell ER staff that she wanted to \"fire\" her PCP because he would not admit her.    Initial work up in the ER showed potassium of 3.3, dimer 0.17, troponin 0.03.  EKG showed NSR.  CXR showed no acute findings.      Past Medical History:   Diagnosis Date   • Anxiety    • Arrhythmia    • Asthma    • Chest pain 8/14/2019   • Depression    • Disease of thyroid gland    • GERD (gastroesophageal reflux disease)    • Migraine    • Schizoaffective disorder (CMS/HCC)      Past Surgical History:   Procedure Laterality Date   • COLONOSCOPY     • DENTAL PROCEDURE     • EAR TUBES Bilateral     x2 times   • ENDOSCOPY     • TONSILLECTOMY       Family History   Problem Relation Age of Onset   • Hypertension Mother    • Migraines Mother    • Osteoarthritis Mother    • Hyperlipidemia Father    • Hypertension Father      Social History     Tobacco Use   • Smoking status: Never Smoker   • Smokeless tobacco: Never Used   Substance Use Topics   • Alcohol use: Yes     Alcohol/week: 1.2 oz     Types: 2 " "Glasses of wine per week     Frequency: Never   • Drug use: No     Medications Prior to Admission   Medication Sig Dispense Refill Last Dose   • ARIPiprazole (ABILIFY) 30 MG tablet Take 30 mg by mouth Every Morning.   8/14/2019 at 0800   • cetirizine (zyrTEC) 10 MG tablet Take 10 mg by mouth 2 (Two) Times a Day.   8/14/2019 at 0800   • FLUoxetine (PROzac) 20 MG capsule Take 60 mg by mouth Every Morning.   8/14/2019 at 0800   • levothyroxine (SYNTHROID, LEVOTHROID) 50 MCG tablet Take 50 mcg by mouth Every Morning.  0 8/14/2019 at 0800   • LORazepam (ATIVAN) 0.5 MG tablet Take 0.5 mg by mouth every night at bedtime.   8/13/2019 at Unknown time   • Multiple Vitamins-Minerals (MULTIVITAMIN WITH MINERALS) tablet tablet Take 1 tablet by mouth Daily.   8/14/2019 at 0800   • omeprazole (priLOSEC) 40 MG capsule Take 40 mg by mouth every night at bedtime.   8/13/2019 at Unknown time   • QUEtiapine (SEROquel) 200 MG tablet Take 200 mg by mouth Every Night.   8/13/2019 at Unknown time   • ROZEREM 8 MG tablet Take 8 mg by mouth Every Night.   8/13/2019 at Unknown time   • topiramate (TOPAMAX) 200 MG tablet Take 200 mg by mouth every night at bedtime.   8/13/2019 at Unknown time   • Vitamin D, Cholecalciferol, (CHOLECALCIFEROL) 400 units tablet Take 2,000 Units by mouth Daily.   8/14/2019 at 0800   • EPINEPHrine (EPIPEN) 0.3 MG/0.3ML solution auto-injector injection 0.3 mg 1 (One) Time As Needed.        Allergies:  Parabens and Tea tree oil      There is no immunization history on file for this patient.        REVIEW OF SYSTEMS:     Review of Systems   Cardiovascular: Positive for chest pain.   All other systems reviewed and are negative.      Vital Signs  Temp:  [97.5 °F (36.4 °C)-98.4 °F (36.9 °C)] 97.5 °F (36.4 °C)  Heart Rate:  [] 88  Resp:  [16-20] 18  BP: (102-136)/(64-81) 102/68    Flowsheet Rows      First Filed Value   Admission Height  152.4 cm (60\") Documented at 08/14/2019 1901   Admission Weight  77.6 kg (171 " lb) Documented at 08/14/2019 1901           Physical Exam:    Physical Exam   Constitutional: She is oriented to person, place, and time. She appears well-developed and well-nourished.   HENT:   Head: Normocephalic and atraumatic.   Eyes: EOM are normal. Pupils are equal, round, and reactive to light.   Neck: Normal range of motion.   Cardiovascular: Normal rate and regular rhythm.   Pulmonary/Chest: Effort normal and breath sounds normal.   Abdominal: Soft. Bowel sounds are normal.   Obese   Musculoskeletal: Normal range of motion. She exhibits no edema.   Neurological: She is alert and oriented to person, place, and time.   Skin: Skin is warm and dry.       Results Review:    I reviewed the patient's new clinical results.  Lab Results (most recent)     Procedure Component Value Units Date/Time    Troponin [[id number]]  (Normal) Collected:  08/14/19 1944    Specimen:  Blood from Arm, Right Updated:  08/14/19 2016     Troponin I <0.030 ng/mL     Narrative:       Troponin I Reference Range:    0.00-0.03  Negative.  Repeat testing in 4-6 hours if clinically indicated.    0.04-0.29  Suspicious for myocardial injury. Serial measurements and clinical  correlation may be necessary to confirm or exclude diagnosis of acute  coronary syndrome.  Repeat testing in 4-6 hours if indicated.     >0.29 Consistent with myocardial injury.  Recommend clinical and laboratory correlation.     Results my be falsely decreased if patient taking Biotin.           Imaging Results (most recent)     None        reviewed    ECG/EMG Results (most recent)     Procedure Component Value Units Date/Time    ECG 12 Lead [[id number]] Collected:  08/14/19 1913     Updated:  08/14/19 1915    Narrative:       HEART RATE= 91  bpm  RR Interval= 660  ms  NH Interval= 136  ms  P Horizontal Axis= 15  deg  P Front Axis= 42  deg  QRSD Interval= 87  ms  QT Interval= 351  ms  QRS Axis= 59  deg  T Wave Axis= 16  deg  - NORMAL ECG -  Sinus rhythm  Electronically  Signed By:   Date and Time of Study: 2019 19:13:06        reviewed         Results for orders placed during the hospital encounter of 18   Adult Transthoracic Echo Complete W/ Cont if Necessary Per Protocol    Narrative                           Adult Echocardiogram Report          [facility]  Cardiology Department  [address] [place] [address]  [id number]      Name: JONO SALAS     Study Date: 11/15/2018 12:59 PM    BP: 105/69 mmHg  MRN: [id number]              Patient Location:   : 1985             Gender: Female                     Height: 58 in  Age: 33 yrs                 Account#: [id number]              Weight: 180 lb  Reason For Study: SEIZURE                                      BSA: 1.7 m2  Ordering Physician: ALVARADO FRANCISCO  Referring Physician:  ALVARADO FRANCISCO  Performed By: LUIS MIGUEL      M-Mode/2-D Measurements:  LVIDd: 3.8 cm       (3.7-5.7) LVPWd: 0.75 cm       (0.8-1.2)  LVIDs: 2.5 cm       (2.3-3.9)  ACS: 1.7 cm         (1.6-3.7) IVSd: 0.70 cm        (0.7-1.2)  LA dimension: 2.7 cm(1.9-4.0) RVDd: 2.6 cm         (0.7-2.4)  FS: 33.0 %          (21-40%)  Ao root diam: 2.7 cm (2.0-3.7)    Comments  Normal LV systolic function EF 60%  RV cavity was not seen clearly probably normal function  Left atrium right atrium and aortic root diameter is normal  Mitral aortic and tricuspid leaflets structurally appears to be normal with  normal function  No significant mitral or tricuspid insufficiency no aortic stenosis  Pulmonary leaflets not seen clearly  Interatrial septum is intact  No intracardiac thrombus noted  No significant pericardial effusion noted.      Interpretation  Normal LV systolic function EF 60%  No significant Doppler abnormalities    MMode/2D Measurements & Calculations  ESV(Teich): 23.1 ml  EF(Teich): 62.3 %                    Ao root area: 5.6 cm2  LVOT diam: 1.6 cm                    EDV(MOD-sp4): 91.9 ml                                       ESV(MOD-sp4): 31.4  ml                                       EF(MOD-sp4): 65.8 %      Doppler Measurements & Calculations  MV E max lexa: 136.0 cm/sec                MV max P.1 mmHg  MV A max lexa: 54.0 cm/sec                 MV mean P.9 mmHg  MV E/A: 2.5  MV dec slope: 642.0 cm/sec2               Ao V2 max: 155.6 cm/sec  MV dec time: 0.21 sec                     Ao max P.7 mmHg                                            Ao V2 mean: 99.0 cm/sec                                            Ao mean P.7 mmHg                                            Ao V2 VTI: 33.6 cm                                            JANIE(I,D): 1.3 cm2                                            JANIE(V,D): 1.3 cm2    LV V1 max P.7 mmHg                    PA max P.1 mmHg  LV V1 mean P.5 mmHg  LV V1 max: 108.4 cm/sec  LV V1 mean: 74.0 cm/sec  LV V1 VTI: 23.3 cm    _______________________________________________________________________________      Electronically signed by: Maco Brown MD  on 2018 09:29 AM         XR Chest 1 View  Narrative:    DATE OF EXAM:   2019 10:50 AM     PROCEDURE:   XR CHEST 1 VW-     INDICATIONS:   chest pain     COMPARISON:  2019     TECHNIQUE:   [Portable chest radiograph]     FINDINGS:    The cardiomediastinal silhouette is within normal limits. The lungs are  clear. There is no pneumothorax, focal consolidation, or large pleural  effusion. Osseous structures grossly intact.      Impression: No acute process.      Electronically Signed By-Chito Bynum On:2019 10:57 AM  This report was finalized on [id number] by  Chito Bynum, .      Assessment/Plan       Chest pain      Plan    Chest pain  -Her pain was reproducible with palpation.  Long discussion with patient that her pain is less likely a true cardiac blockage and can likely be explained by another factor.  Also advised her that it will be beneficial once the holter monitor results and will give more insight as to what might be  causing her syncope and other issues.  She has no co-morbid factors to suspect significant cardiac disease.  Advised her that no additional cardiac testing would be ordered at this time.  She was concerned that having used an Epi pen in the past caused cardiac abnormalities.  I advise her this was unlikely. She verbalized understanding.  -Serial troponin  -Follow up holter monitor    Hypokalemia  -Potassium 3.1, replace per protocol   -Follow up potassium    Syncope this week  -Follow up holter monitor  -Likely some clinical dehydration.  NS at 100mL/hr    Bipolar disorder/Tourette's/Chronic Anxiety  -Continue Abilify and Seroquel  -INSPECT verified, continue Ativan    Chronic migraines  -Continue Topamax    GERD  -Continue PPI    Chronic insomnia  -Continue Ambien in place of Rozerem     Hypothyroidism  -Continue Levothyroxine   -Check TSH, FT3, FT4    DVT Prophylaxis  -Bilateral SCD's     Disposition    Observation     I discussed the patients findings and my recommendations with patient.     Jade Goff-Radha, CHAPARRO  08/15/19  1:28 AM

## 2019-08-15 NOTE — PROGRESS NOTES
"          Ms. Concepcion is a 34 year old old  female with a past medical history significant for Bipolar disorder, Tourette's, chronic migraines, GERD, and hypothyroidism.     She reports to the ER for the second time this week.  She was seen Monday for complaints of syncope and heart palpitations.  She was placed on a Holter Monitor and returned it yesterday.  She states that she passed out at work on Monday.  She states she had no problems Tuesday and was out shopping and walking.  She then returned to work today where she states she began having significant chest pain with radiation to her left arm, back, and neck.  She describes it as an ache. She denies nausea, dyspnea, or dizziness.  While in the ER, she was called to her PCP, Dr. Allred who requested she be discharged from the ER.  She proceeded to tell ER staff that she wanted to \"fire\" her PCP because he would not admit her.              Objective    Vital Signs  Temp:  [97.5 °F (36.4 °C)-98.4 °F (36.9 °C)] 98.4 °F (36.9 °C)  Heart Rate:  [88-97] 88  Resp:  [14-18] 14  BP: (101-125)/(68-81) 102/68  Oxygen Therapy  SpO2: 100 %  Pulse Oximetry Type: Intermittent  Device (Oxygen Therapy): room air  Flowsheet Rows      First Filed Value   Admission Height  152.4 cm (60\") Documented at 08/14/2019 1901   Admission Weight  77.6 kg (171 lb) Documented at 08/14/2019 1901        Intake & Output (last 3 days)       08/12 0701 - 08/13 0700 08/13 0701 - 08/14 0700 08/14 0701 - 08/15 0700 08/15 0701 - 08/16 0700    P.O.    120    I.V. (mL/kg)   716.7 (9.2) 386.7 (5)    Total Intake(mL/kg)   716.7 (9.2) 506.7 (6.5)    Net   +716.7 +506.7            Urine Unmeasured Occurrence   1 x 2 x        Lines, Drains & Airways    Active LDAs     Name:   Placement date:   Placement time:   Site:   Days:    Peripheral IV 08/14/19 1935 Right Hand   08/14/19    1935    Hand   less than 1                Physical Exam:    General Appearance:    Alert, cooperative, in no acute " distress   Head:    Normocephalic, without obvious abnormality, atraumatic   Eyes:            conjunctivae and sclerae normal, no   icterus, no pallor, corneas  clear, PERRLA   Neck:   No adenopathy, supple, trachea midline, no thyromegaly, no   carotid bruit, no JVD   Lungs:     Clear to auscultation,respirations regular, even and                  unlabored    Heart:    Regular rhythm and normal rate, normal S1 and S2, no            murmur, no gallop, no rub, no click   Abdomen:     Normal bowel sounds, no masses, no organomegaly, soft        non-tender, non-distended, no guarding, no rebound                No tenderness   Extremities:   Moves all extremities well, no edema, no cyanosis, no             redness   Lymph nodes:   No palpable adenopathy   Neurologic:   Cranial nerves 2 - 12 grossly intact, sensation intact, DTR       present and equal bilaterally       Results Review:     I reviewed the patient's new clinical results.    Lab Results (last 24 hours)     Procedure Component Value Units Date/Time    Troponin [[id number]]  (Normal) Collected:  08/15/19 1245    Specimen:  Blood Updated:  08/15/19 1347     Troponin I <0.030 ng/mL     Narrative:       Troponin I Reference Range:    0.00-0.03  Negative.  Repeat testing in 4-6 hours if clinically indicated.    0.04-0.29  Suspicious for myocardial injury. Serial measurements and clinical  correlation may be necessary to confirm or exclude diagnosis of acute  coronary syndrome.  Repeat testing in 4-6 hours if indicated.     >0.29 Consistent with myocardial injury.  Recommend clinical and laboratory correlation.     Results my be falsely decreased if patient taking Biotin.     Potassium [[id number]]  (Normal) Collected:  08/15/19 0729    Specimen:  Blood Updated:  08/15/19 0835     Potassium 4.6 mmol/L     Troponin [[id number]]  (Normal) Collected:  08/15/19 0729    Specimen:  Blood Updated:  08/15/19 0833     Troponin I <0.030 ng/mL     Narrative:       Troponin I  Reference Range:    0.00-0.03  Negative.  Repeat testing in 4-6 hours if clinically indicated.    0.04-0.29  Suspicious for myocardial injury. Serial measurements and clinical  correlation may be necessary to confirm or exclude diagnosis of acute  coronary syndrome.  Repeat testing in 4-6 hours if indicated.     >0.29 Consistent with myocardial injury.  Recommend clinical and laboratory correlation.     Results my be falsely decreased if patient taking Biotin.     TSH [[id number]]  (Normal) Collected:  08/14/19 1041    Specimen:  Blood Updated:  08/15/19 0335     TSH 1.630 mIU/mL      Comment: Results may be falsely decreased if patient taking Biotin.       T4, Free [[id number]]  (Normal) Collected:  08/14/19 1041    Specimen:  Blood Updated:  08/15/19 0335     Free T4 1.07 ng/dL      Comment: Results may be falsely increased if patient taking Biotin.       T3, Free [[id number]]  (Normal) Collected:  08/14/19 1041    Specimen:  Blood Updated:  08/15/19 0335     T3, Free 3.59 pg/mL     Troponin [[id number]]  (Normal) Collected:  08/14/19 1944    Specimen:  Blood from Arm, Right Updated:  08/14/19 2016     Troponin I <0.030 ng/mL     Narrative:       Troponin I Reference Range:    0.00-0.03  Negative.  Repeat testing in 4-6 hours if clinically indicated.    0.04-0.29  Suspicious for myocardial injury. Serial measurements and clinical  correlation may be necessary to confirm or exclude diagnosis of acute  coronary syndrome.  Repeat testing in 4-6 hours if indicated.     >0.29 Consistent with myocardial injury.  Recommend clinical and laboratory correlation.     Results my be falsely decreased if patient taking Biotin.           Imaging Results (last 24 hours)     ** No results found for the last 24 hours. **                                         Xrays, labs reviewed personally by physician.    ECG/EMG Results (most recent)     Procedure Component Value Units Date/Time    ECG 12 Lead [[id number]] Collected:  08/14/19 1913      Updated:  08/14/19 1915    Narrative:       HEART RATE= 91  bpm  RR Interval= 660  ms  WA Interval= 136  ms  P Horizontal Axis= 15  deg  P Front Axis= 42  deg  QRSD Interval= 87  ms  QT Interval= 351  ms  QRS Axis= 59  deg  T Wave Axis= 16  deg  - NORMAL ECG -  Sinus rhythm  Electronically Signed By:   Date and Time of Study: 2019-08-14 19:13:06            Medication Review:   I have reviewed the patient's current medication list    Current Facility-Administered Medications:   •  ARIPiprazole (ABILIFY) tablet 30 mg, 30 mg, Oral, QAM, Denver-Short, Jade, APRN, 30 mg at 08/15/19 0811  •  cetirizine (zyrTEC) tablet 10 mg, 10 mg, Oral, BID, Denver-Short, Jade, APRN, 10 mg at 08/15/19 0809  •  cholecalciferol (VITAMIN D3) tablet 2,000 Units, 2,000 Units, Oral, Daily, Denver-Short, Jade, APRN, 2,000 Units at 08/15/19 0809  •  FLUoxetine (PROzac) capsule 60 mg, 60 mg, Oral, QAM, Denver-Short, Jade, APRN, 60 mg at 08/15/19 0809  •  levothyroxine (SYNTHROID, LEVOTHROID) tablet 50 mcg, 50 mcg, Oral, Q AM, Denver-Short, Jade, APRN, 50 mcg at 08/15/19 0617  •  LORazepam (ATIVAN) tablet 0.5 mg, 0.5 mg, Oral, Nightly, Denver-Short, Jade, APRN  •  ondansetron (ZOFRAN) tablet 4 mg, 4 mg, Oral, Q6H PRN, Denver-Short, Jade, APRN  •  pantoprazole (PROTONIX) EC tablet 40 mg, 40 mg, Oral, QAM, Denver-Short, Jade, APRN, 40 mg at 08/15/19 0809  •  potassium chloride (K-DUR,KLOR-CON) CR tablet 40 mEq, 40 mEq, Oral, PRN, Denver-Short, Jade, APRN, 40 mEq at 08/15/19 0223  •  potassium chloride (KLOR-CON) packet 40 mEq, 40 mEq, Oral, PRN, Denver-Short, Jade, APRN  •  QUEtiapine (SEROquel) tablet 200 mg, 200 mg, Oral, Nightly, Denver-Short, Jade, APRN, 200 mg at 08/14/19 2316  •  sodium chloride 0.9 % flush 3 mL, 3 mL, Intravenous, Q12H, Denver-Short, Jade, APRN, 3 mL at 08/14/19 2315  •  sodium chloride 0.9 % flush 3-10 mL, 3-10 mL, Intravenous, PRN, Denver-Short, Jade, APRN  •  topiramate (TOPAMAX) tablet 200 mg, 200 mg, Oral, Nightly,  Jade France APRN, 200 mg at 08/14/19 8846  •  zolpidem (AMBIEN) tablet 5 mg, 5 mg, Oral, Nightly, Jade France APRN      Assessment/Plan     Patient remains anxious about her episodes of chest pain describes it as sharp left-sided one episode radiation to the arm no current pain I will go ahead and do a stress test on her otherwise she is likely to come back in the ER troponins have been negative clinical probability of this being cardiac is low outpatient Holter monitor the results of which are pending no arrhythmias seen on the cardiac monitor in-house      John Cordova MD Hypokalemia  -Potassium 3.1, replace per protocol   -Follow up potassium     Syncope this week  -Follow up holter monitor  -Likely some clinical dehydration.  NS at 100mL/hr     Bipolar disorder/Tourette's/Chronic Anxiety  -Continue Abilify and Seroquel  -INSPECT verified, continue Ativan     Chronic migraines  -Continue Topamax     GERD  -Continue PPI     Chronic insomnia  -Continue Ambien in place of Rozerem      Hypothyroidism  -Continue Levothyroxine   -Check TSH, FT3, FT4      Patient had outpatient  08/15/19  2:13 PM

## 2019-08-15 NOTE — PROGRESS NOTES
Discharge Planning Assessment   [facility]     Patient Name: Krupa Concepcion  MRN: [id number]  Today's Date: 8/15/2019    Admit Date: 8/14/2019    Discharge Needs Assessment     Row Name 08/15/19 1555       Living Environment    Lives With  alone    Current Living Arrangements  home/apartment/condo    Primary Care Provided by  self    Provides Primary Care For  no one    Able to Return to Prior Arrangements  yes       Resource/Environmental Concerns    Resource/Environmental Concerns  none    Transportation Concerns  car, none       Transition Planning    Patient/Family Anticipates Transition to  home    Patient/Family Anticipated Services at Transition  none       Discharge Needs Assessment    Concerns to be Addressed  no discharge needs identified;denies needs/concerns at this time    Equipment Currently Used at Home  none    Anticipated Changes Related to Illness  none    Equipment Needed After Discharge  none        Discharge Plan     Row Name 08/15/19 1556       Plan    Plan  Routine home     Patient/Family in Agreement with Plan  yes    Plan Comments  Met with patient at bedside. Patient lives at home alone. Patient is independent with ADLs. Denies any discharge needs at this time.           Demographic Summary     Row Name 08/15/19 1555       General Information    Admission Type  observation    Arrived From  emergency department    Referral Source  admission list    Reason for Consult  discharge planning    Preferred Language  English     Used During This Interaction  no       Contact Information    Permission Granted to Share Info With          Functional Status     Row Name 08/15/19 1555       Functional Status    Usual Activity Tolerance  good    Current Activity Tolerance  good       Functional Status, IADL    Medications  independent    Meal Preparation  independent    Housekeeping  independent    Laundry  independent    Shopping  independent       Mental Status    General  Appearance WDL  WDL       Mental Status Summary    Recent Changes in Mental Status/Cognitive Functioning  no changes            Tsering Perales, RN

## 2019-08-15 NOTE — NURSING NOTE
Nuclear here going to do first part of stress test today and if pt can not tolerate treadmill then will finish testing in am

## 2019-08-15 NOTE — PLAN OF CARE
Problem: Patient Care Overview  Goal: Plan of Care Review  Outcome: Ongoing (interventions implemented as appropriate)   08/15/19 0406   Coping/Psychosocial   Plan of Care Reviewed With patient   Plan of Care Review   Progress no change     Goal: Individualization and Mutuality  Outcome: Ongoing (interventions implemented as appropriate)   08/15/19 0406   Individualization   Patient Specific Goals (Include Timeframe) pain free by discharge   Patient Specific Interventions IVF infusing, telemetry monitored      Goal: Discharge Needs Assessment  Outcome: Ongoing (interventions implemented as appropriate)   08/15/19 0406   Discharge Needs Assessment   Readmission Within the Last 30 Days no previous admission in last 30 days   Concerns to be Addressed denies needs/concerns at this time   Patient/Family Anticipates Transition to home   Patient/Family Anticipated Services at Transition none   Transportation Concerns public transportation, none available   Transportation Anticipated public transportation;family or friend will provide   Anticipated Changes Related to Illness none   Equipment Needed After Discharge none   Disability   Equipment Currently Used at Home none       Problem: Cardiac: ACS (Acute Coronary Syndrome) (Adult)  Goal: Signs and Symptoms of Listed Potential Problems Will be Absent, Minimized or Managed (Cardiac: ACS)  Outcome: Ongoing (interventions implemented as appropriate)   08/15/19 0406   Goal/Outcome Evaluation   Problems Assessed (Acute Coronary Syndrome) chest pain (angina);situational response   Problems Present (Acute Coronary Syn) situational response

## 2019-08-15 NOTE — PLAN OF CARE
Problem: Patient Care Overview  Goal: Plan of Care Review  Outcome: Ongoing (interventions implemented as appropriate)   08/15/19 8787   Coping/Psychosocial   Plan of Care Reviewed With patient   Plan of Care Review   Progress improving   OTHER   Outcome Summary has not complained of chest pain this afternoom     Goal: Individualization and Mutuality  Outcome: Ongoing (interventions implemented as appropriate)    Goal: Discharge Needs Assessment  Outcome: Ongoing (interventions implemented as appropriate)      Problem: Cardiac: ACS (Acute Coronary Syndrome) (Adult)  Goal: Signs and Symptoms of Listed Potential Problems Will be Absent, Minimized or Managed (Cardiac: ACS)  Outcome: Ongoing (interventions implemented as appropriate)

## 2019-08-15 NOTE — ED NOTES
NP at bedside to discuss plans of discharge and follow up with PCP. Pt states she does not want to be discharged from ED and follow up with her PCP. Pt is upset and angry. NP to discuss plans of care with charge nurse     Joivta Uribe LPN  08/14/19 0740

## 2019-08-16 ENCOUNTER — APPOINTMENT (OUTPATIENT)
Dept: NUCLEAR MEDICINE | Facility: HOSPITAL | Age: 34
End: 2019-08-16

## 2019-08-16 VITALS
HEART RATE: 88 BPM | RESPIRATION RATE: 18 BRPM | WEIGHT: 171 LBS | DIASTOLIC BLOOD PRESSURE: 74 MMHG | TEMPERATURE: 97.5 F | BODY MASS INDEX: 33.57 KG/M2 | OXYGEN SATURATION: 100 % | SYSTOLIC BLOOD PRESSURE: 111 MMHG | HEIGHT: 60 IN

## 2019-08-16 LAB
BH CV NUCLEAR PRIOR STUDY: 3
BH CV STRESS BP STAGE 1: NORMAL
BH CV STRESS BP STAGE 2: NORMAL
BH CV STRESS BP STAGE 3: NORMAL
BH CV STRESS BP STAGE 4: NORMAL
BH CV STRESS DOSE REGADENOSON STAGE 1: 0.4
BH CV STRESS DURATION MIN STAGE 1: 1
BH CV STRESS DURATION MIN STAGE 2: 1
BH CV STRESS DURATION MIN STAGE 3: 1
BH CV STRESS DURATION SEC STAGE 2: 0
BH CV STRESS DURATION SEC STAGE 4: 34
BH CV STRESS HR STAGE 1: 120
BH CV STRESS HR STAGE 2: 118
BH CV STRESS HR STAGE 3: 121
BH CV STRESS HR STAGE 4: 120
BH CV STRESS PROTOCOL 1: NORMAL
BH CV STRESS RECOVERY BP: NORMAL MMHG
BH CV STRESS RECOVERY HR: 116 BPM
BH CV STRESS STAGE 1: 1
BH CV STRESS STAGE 2: 2
BH CV STRESS STAGE 3: 3
BH CV STRESS STAGE 4: 4
LV EF NUC BP: 72 %
MAXIMAL PREDICTED HEART RATE: 184 BPM
PERCENT MAX PREDICTED HR: 66.85 %
STRESS BASELINE BP: NORMAL MMHG
STRESS BASELINE HR: 93 BPM
STRESS PERCENT HR: 79 %
STRESS POST PEAK BP: NORMAL MMHG
STRESS POST PEAK HR: 123 BPM
STRESS TARGET HR: 156 BPM

## 2019-08-16 PROCEDURE — 25010000002 REGADENOSON 0.4 MG/5ML SOLUTION

## 2019-08-16 PROCEDURE — 93018 CV STRESS TEST I&R ONLY: CPT | Performed by: NURSE PRACTITIONER

## 2019-08-16 PROCEDURE — 78452 HT MUSCLE IMAGE SPECT MULT: CPT | Performed by: NURSE PRACTITIONER

## 2019-08-16 PROCEDURE — A9500 TC99M SESTAMIBI: HCPCS | Performed by: INTERNAL MEDICINE

## 2019-08-16 PROCEDURE — 99217 PR OBSERVATION CARE DISCHARGE MANAGEMENT: CPT | Performed by: INTERNAL MEDICINE

## 2019-08-16 PROCEDURE — G0378 HOSPITAL OBSERVATION PER HR: HCPCS

## 2019-08-16 PROCEDURE — 0 TECHNETIUM SESTAMIBI: Performed by: INTERNAL MEDICINE

## 2019-08-16 RX ADMIN — TECHNETIUM TC 99M SESTAMIBI 1 DOSE: 1 INJECTION INTRAVENOUS at 08:40

## 2019-08-16 RX ADMIN — LEVOTHYROXINE SODIUM 50 MCG: 50 TABLET ORAL at 05:48

## 2019-08-16 RX ADMIN — MELATONIN 2000 UNITS: at 09:00

## 2019-08-16 RX ADMIN — FLUOXETINE 60 MG: 20 CAPSULE ORAL at 08:59

## 2019-08-16 RX ADMIN — PANTOPRAZOLE SODIUM 40 MG: 40 TABLET, DELAYED RELEASE ORAL at 09:00

## 2019-08-16 RX ADMIN — ARIPIPRAZOLE 30 MG: 10 TABLET ORAL at 09:00

## 2019-08-16 RX ADMIN — Medication 3 ML: at 08:59

## 2019-08-16 RX ADMIN — CETIRIZINE HYDROCHLORIDE 10 MG: 10 TABLET, FILM COATED ORAL at 09:00

## 2019-08-16 NOTE — DISCHARGE SUMMARY
"  Date of Admission: 8/14/2019    Date of Discharge:  8/16/2019    Length of stay:  LOS: 0 days         Presenting Problem/History of Present Illness  Active Hospital Problems    Diagnosis  POA   • **Chest pain [R07.9]  Yes      Resolved Hospital Problems   No resolved problems to display.        Hospital Course. Jamey is a 34 year old old  female with a past medical history significant for Bipolar disorder, Tourette's, chronic migraines, GERD, and hypothyroidism.     She reports to the ER for the second time this week.  She was seen Monday for complaints of syncope and heart palpitations.  She was placed on a Holter Monitor and returned it yesterday.  She states that she passed out at work on Monday.  She states she had no problems Tuesday and was out shopping and walking.  She then returned to work today where she states she began having significant chest pain with radiation to her left arm, back, and neck.  She describes it as an ache. She denies nausea, dyspnea, or dizziness.  While in the ER, she was called to her PCP, Dr. Allred who requested she be discharged from the ER.  She proceeded to tell ER staff that she wanted to \"fire\" her PCP because he would not admit her.  We did not find any arrhythmias on telemetry monitoring  Her Holter report is still pending   she was ruled out and sent for a stress Myoview which was completely normal she did not have any recurrence of symptoms or any passing out episodes she was discharged home in stable condition with instructions to follow-up with her PCP on the Holter report discharge diagnosis is noncardiac chest pain #2 vasovagal episode    Patient was admitted kept on the monitor       Past Medical History:     Past Medical History:   Diagnosis Date   • Anxiety    • Arrhythmia    • Asthma    • Chest pain 8/14/2019   • Depression    • Disease of thyroid gland    • GERD (gastroesophageal reflux disease)    • Migraine    • Schizoaffective disorder (CMS/[facility])  "       Past Surgical History:     Past Surgical History:   Procedure Laterality Date   • COLONOSCOPY     • DENTAL PROCEDURE     • EAR TUBES Bilateral     x2 times   • ENDOSCOPY     • TONSILLECTOMY           Procedures Performed         Consults:   Consults     Date and Time Order Name Status Description    2019 Hospitalist (on-call MD unless specified) Completed     2019 Family Medicine Consult Completed     2019 1222 Hospitalist (on-call MD unless specified) Completed     2019 133 Family Medicine Consult Completed           Pertinent Test Results:     Lab Results (last 24 hours)     ** No results found for the last 24 hours. **               Results for orders placed during the hospital encounter of 18   Adult Transthoracic Echo Complete W/ Cont if Necessary Per Protocol    Narrative                           Adult Echocardiogram Report          [facility]  Cardiology Department  [address] [place] [address]  [id number]      Name: JONO SALAS     Study Date: 11/15/2018 12:59 PM    BP: 105/69 mmHg  MRN: [id number]              Patient Location:   : 1985             Gender: Female                     Height: 58 in  Age: 33 yrs                 Account#: [id number]              Weight: 180 lb  Reason For Study: SEIZURE                                      BSA: 1.7 m2  Ordering Physician: ALVARADO FRANCISCO  Referring Physician:  ALVARADO FRANCISCO  Performed By: LUIS MIGUEL      M-Mode/2-D Measurements:  LVIDd: 3.8 cm       (3.7-5.7) LVPWd: 0.75 cm       (0.8-1.2)  LVIDs: 2.5 cm       (2.3-3.9)  ACS: 1.7 cm         (1.6-3.7) IVSd: 0.70 cm        (0.7-1.2)  LA dimension: 2.7 cm(1.9-4.0) RVDd: 2.6 cm         (0.7-2.4)  FS: 33.0 %          (21-40%)  Ao root diam: 2.7 cm (2.0-3.7)    Comments  Normal LV systolic function EF 60%  RV cavity was not seen clearly probably normal function  Left atrium right atrium and aortic root diameter is normal  Mitral aortic and tricuspid leaflets  structurally appears to be normal with  normal function  No significant mitral or tricuspid insufficiency no aortic stenosis  Pulmonary leaflets not seen clearly  Interatrial septum is intact  No intracardiac thrombus noted  No significant pericardial effusion noted.      Interpretation  Normal LV systolic function EF 60%  No significant Doppler abnormalities    MMode/2D Measurements & Calculations  ESV(Teich): 23.1 ml  EF(Teich): 62.3 %                    Ao root area: 5.6 cm2  LVOT diam: 1.6 cm                    EDV(MOD-sp4): 91.9 ml                                       ESV(MOD-sp4): 31.4 ml                                       EF(MOD-sp4): 65.8 %      Doppler Measurements & Calculations  MV E max lexa: 136.0 cm/sec                MV max P.1 mmHg  MV A max lexa: 54.0 cm/sec                 MV mean P.9 mmHg  MV E/A: 2.5  MV dec slope: 642.0 cm/sec2               Ao V2 max: 155.6 cm/sec  MV dec time: 0.21 sec                     Ao max P.7 mmHg                                            Ao V2 mean: 99.0 cm/sec                                            Ao mean P.7 mmHg                                            Ao V2 VTI: 33.6 cm                                            JANIE(I,D): 1.3 cm2                                            JANIE(V,D): 1.3 cm2    LV V1 max P.7 mmHg                    PA max P.1 mmHg  LV V1 mean P.5 mmHg  LV V1 max: 108.4 cm/sec  LV V1 mean: 74.0 cm/sec  LV V1 VTI: 23.3 cm    _______________________________________________________________________________      Electronically signed by: Maco Brown MD  on 2018 09:29 AM                 Vital Signs  Temp:  [97.4 °F (36.3 °C)-98.7 °F (37.1 °C)] 97.5 °F (36.4 °C)  Heart Rate:  [] 88  Resp:  [13-18] 18  BP: (100-115)/(68-81) 111/74      Discharge Disposition  Home or Self Care    Discharge Medications     Discharge Medications      Continue These Medications      Instructions Start Date   ARIPiprazole  30 MG tablet  Commonly known as:  ABILIFY   30 mg, Oral, Every Morning      cetirizine 10 MG tablet  Commonly known as:  zyrTEC   10 mg, Oral, 2 Times Daily      EPINEPHrine 0.3 MG/0.3ML solution auto-injector injection  Commonly known as:  EPIPEN   0.3 mg, Once As Needed      FLUoxetine 20 MG capsule  Commonly known as:  PROzac   60 mg, Oral, Every Morning      levothyroxine 50 MCG tablet  Commonly known as:  SYNTHROID, LEVOTHROID   50 mcg, Oral, Every Morning      LORazepam 0.5 MG tablet  Commonly known as:  ATIVAN   0.5 mg, Oral, Every Night at Bedtime      multivitamin with minerals tablet tablet   1 tablet, Oral, Daily      omeprazole 40 MG capsule  Commonly known as:  priLOSEC   40 mg, Oral, Every Night at Bedtime      QUEtiapine 200 MG tablet  Commonly known as:  SEROquel   200 mg, Oral, Nightly      ROZEREM 8 MG tablet  Generic drug:  ramelteon   8 mg, Oral, Nightly      topiramate 200 MG tablet  Commonly known as:  TOPAMAX   200 mg, Oral, Every Night at Bedtime      Vitamin D (Cholecalciferol) 400 units tablet  Commonly known as:  CHOLECALCIFEROL   2,000 Units, Oral, Daily             Discharge Diet:     Activity at Discharge:     Follow-up Appointments  No future appointments.      Test Results Pending at Discharge    John Cordova MD  08/16/19  4:37 PM     Time spent more than 30 minutes spent on discharge

## 2019-08-16 NOTE — PLAN OF CARE
Problem: Patient Care Overview  Goal: Plan of Care Review  Outcome: Ongoing (interventions implemented as appropriate)   08/16/19 0633   Coping/Psychosocial   Plan of Care Reviewed With patient   Plan of Care Review   Progress improving     Goal: Discharge Needs Assessment  Outcome: Ongoing (interventions implemented as appropriate)   08/16/19 0633   Discharge Needs Assessment   Readmission Within the Last 30 Days no previous admission in last 30 days   Concerns to be Addressed no discharge needs identified;denies needs/concerns at this time   Patient/Family Anticipates Transition to home   Patient/Family Anticipated Services at Transition none   Transportation Concerns car, none   Transportation Anticipated family or friend will provide;public transportation   Anticipated Changes Related to Illness none   Offered/Gave Vendor List no   Disability   Equipment Currently Used at Home none       Problem: Cardiac: ACS (Acute Coronary Syndrome) (Adult)  Goal: Signs and Symptoms of Listed Potential Problems Will be Absent, Minimized or Managed (Cardiac: ACS)  Outcome: Ongoing (interventions implemented as appropriate)   08/16/19 0633   Goal/Outcome Evaluation   Problems Assessed (Acute Coronary Syndrome) chest pain (angina);dysrhythmia/arrhythmia;situational response   Problems Present (Acute Coronary Syn) none

## 2019-08-16 NOTE — PLAN OF CARE
Problem: Patient Care Overview  Goal: Plan of Care Review  Outcome: Ongoing (interventions implemented as appropriate)   08/16/19 6754   Coping/Psychosocial   Plan of Care Reviewed With patient   Plan of Care Review   Progress improving   OTHER   Outcome Summary no chest pain     Goal: Individualization and Mutuality  Outcome: Ongoing (interventions implemented as appropriate)    Goal: Discharge Needs Assessment  Outcome: Ongoing (interventions implemented as appropriate)      Problem: Cardiac: ACS (Acute Coronary Syndrome) (Adult)  Goal: Signs and Symptoms of Listed Potential Problems Will be Absent, Minimized or Managed (Cardiac: ACS)  Outcome: Ongoing (interventions implemented as appropriate)

## 2019-08-17 ENCOUNTER — READMISSION MANAGEMENT (OUTPATIENT)
Dept: CALL CENTER | Facility: HOSPITAL | Age: 34
End: 2019-08-17

## 2019-08-17 NOTE — OUTREACH NOTE
Prep Survey      Responses   Facility patient discharged from?  [facility]   Is patient eligible?  Yes   Discharge diagnosis  Chest pain   Does the patient have one of the following disease processes/diagnoses(primary or secondary)?  Other   Does the patient have Home health ordered?  No   Is there a DME ordered?  No   Prep survey completed?  Yes          Erica Walls RN

## 2019-08-18 PROCEDURE — 93227 XTRNL ECG REC<48 HR R&I: CPT | Performed by: INTERNAL MEDICINE

## 2019-08-19 ENCOUNTER — READMISSION MANAGEMENT (OUTPATIENT)
Dept: CALL CENTER | Facility: HOSPITAL | Age: 34
End: 2019-08-19

## 2019-08-19 NOTE — OUTREACH NOTE
Medical Week 1 Survey      Responses   Facility patient discharged from?  [facility]   Does the patient have one of the following disease processes/diagnoses(primary or secondary)?  Other   Is there a successful TCM telephone encounter documented?  No   Week 1 attempt successful?  Yes   Call start time  1602   Call end time  1603   Discharge diagnosis  Chest pain   Is the patient taking all medications as directed (includes completed medication regime)?  Yes   Does the patient have a primary care provider?   Yes   Does the patient have an appointment with their PCP within 7 days of discharge?  No   What is preventing the patient from scheduling follow up appointments within 7 days of discharge?  Haven't had time   Nursing Interventions  Advised patient to make appointment   Has home health visited the patient within 72 hours of discharge?  N/A   Did the patient receive a copy of their discharge instructions?  Yes   Nursing interventions  Reviewed instructions with patient   What is the patient's perception of their health status since discharge?  Improving   Is the patient/caregiver able to teach back the hierarchy of who to call/visit for symptoms/problems? PCP, Specialist, Home health nurse, Urgent Care, ED, 911  Yes   Week 1 call completed?  Yes   Graduated  Yes   Did the patient feel the follow up calls were helpful during their recovery period?  Yes   Was the number of calls appropriate?  Yes   Graduated/Revoked comments  Will call to make her PCP appt with no questions at this time          Bea Beatty LPN

## 2019-09-11 ENCOUNTER — OFFICE VISIT (OUTPATIENT)
Dept: FAMILY MEDICINE CLINIC | Facility: CLINIC | Age: 34
End: 2019-09-11

## 2019-09-11 VITALS
WEIGHT: 168.6 LBS | DIASTOLIC BLOOD PRESSURE: 72 MMHG | SYSTOLIC BLOOD PRESSURE: 108 MMHG | HEART RATE: 96 BPM | HEIGHT: 60 IN | TEMPERATURE: 98.2 F | OXYGEN SATURATION: 98 % | RESPIRATION RATE: 16 BRPM | BODY MASS INDEX: 33.1 KG/M2

## 2019-09-11 DIAGNOSIS — F31.60 BIPOLAR 1 DISORDER, MIXED (HCC): Chronic | ICD-10-CM

## 2019-09-11 DIAGNOSIS — K21.9 GASTROESOPHAGEAL REFLUX DISEASE WITHOUT ESOPHAGITIS: Chronic | ICD-10-CM

## 2019-09-11 DIAGNOSIS — R07.9 CHEST PAIN, UNSPECIFIED TYPE: Primary | ICD-10-CM

## 2019-09-11 DIAGNOSIS — E03.9 ACQUIRED HYPOTHYROIDISM: Chronic | ICD-10-CM

## 2019-09-11 DIAGNOSIS — J30.9 ALLERGIC RHINITIS, UNSPECIFIED SEASONALITY, UNSPECIFIED TRIGGER: ICD-10-CM

## 2019-09-11 PROBLEM — E55.9 VITAMIN D DEFICIENCY: Status: ACTIVE | Noted: 2017-03-28

## 2019-09-11 PROBLEM — Z87.892 PERSONAL HISTORY OF ANAPHYLAXIS: Status: ACTIVE | Noted: 2017-03-28

## 2019-09-11 PROBLEM — R73.01 IMPAIRED FASTING GLUCOSE: Status: ACTIVE | Noted: 2017-06-28

## 2019-09-11 PROCEDURE — 99214 OFFICE O/P EST MOD 30 MIN: CPT | Performed by: FAMILY MEDICINE

## 2019-09-11 NOTE — PATIENT INSTRUCTIONS
Your current medications and treatment.    Up in the office in 6 months.    Laboratory testing at that testing at that time

## 2019-09-11 NOTE — PROGRESS NOTES
"Subjective   Krupa Concepcion is a 34 y.o. female.     Chief Complaint   Patient presents with   • Chest Pain     HOSPITAL F/U       HPI  Chief complaint : Chest pain bipolar disease hyper thyroidism Gastrosoft reflux disease allergic rhinitis    The patient is a 34-year-old white female comes in for follow-up and maintenance of her current problems which includes    1.  Chest pain-new-patient recently hospitalized for chest pain.  Patient had testing which include EKG stress Myoview chest x-ray laboratory testing.  Studies failed to reveal a cause of her chest pain.  Patient was discharged home.  Patient states that she thinks it was related to stress.  She was having stress related to family issues.  These resolved.  Patient states her pain is gone.  Want further testing at this time.    2.  Bipolar disease-stable-patient on topiramate 20 mg daily Prozac 20 mg daily Abilify 30 mg daily and Seroquel 2 mg at night and Christiano 8 mg at night.  She denies anxiousness agitation depressed or suicidal thoughts    3.  HypoThyroidism-stable-patient Synthroid 25 mg daily.  Denies heat or cold intolerance.  Denies tremor weight gain weight loss    4.  Gastroesophageal reflux disease-stable-patient is currently on Prilosec.  No dysphagia or heartburn.    5.  Allergic rhinitis-stable-patient is currently on Zyrtec.  She denied sinus congestion drainage sore throat cough          The following portions of the patient's history were reviewed and updated as appropriate: allergies, current medications, past family history, past medical history, past social history, past surgical history and problem list.    Review of Systems    Objective     /72 (BP Location: Left arm, Patient Position: Sitting, Cuff Size: Adult)   Pulse 96   Temp 98.2 °F (36.8 °C) (Oral)   Resp 16   Ht 152.4 cm (60\")   Wt 76.5 kg (168 lb 9.6 oz)   SpO2 98%   BMI 32.93 kg/m²     Physical Exam   Constitutional: She is oriented to person, place, and " time. She appears well-developed and well-nourished.   HENT:   Head: Normocephalic and atraumatic.   Eyes: Conjunctivae and EOM are normal. Pupils are equal, round, and reactive to light.   Neck: Normal range of motion. Neck supple.   Cardiovascular: Normal rate, regular rhythm, normal heart sounds and intact distal pulses.   Pulmonary/Chest: Effort normal and breath sounds normal.   Abdominal: Soft. Bowel sounds are normal.   Musculoskeletal: Normal range of motion.   Neurological: She is alert and oriented to person, place, and time.   Skin: Skin is warm and dry.   Psychiatric: She has a normal mood and affect. Her behavior is normal.   Nursing note and vitals reviewed.        Assessment/Plan   Krupa was seen today for chest pain.    Diagnoses and all orders for this visit:    Chest pain, unspecified type    Bipolar 1 disorder, mixed (CMS/HCC)    Acquired hypothyroidism    Gastroesophageal reflux disease without esophagitis    Allergic rhinitis, unspecified seasonality, unspecified trigger      Patient Instructions   Your current medications and treatment.    Up in the office in 6 months.    Laboratory testing at that testing at that time      Erlin Allred Jr., MD    09/11/19

## 2019-09-27 ENCOUNTER — CLINICAL SUPPORT (OUTPATIENT)
Dept: FAMILY MEDICINE CLINIC | Facility: CLINIC | Age: 34
End: 2019-09-27

## 2019-09-27 DIAGNOSIS — Z23 NEED FOR PROPHYLACTIC VACCINATION AND INOCULATION AGAINST INFLUENZA: Primary | ICD-10-CM

## 2019-09-27 PROCEDURE — 90471 IMMUNIZATION ADMIN: CPT | Performed by: FAMILY MEDICINE

## 2019-09-27 PROCEDURE — 90674 CCIIV4 VAC NO PRSV 0.5 ML IM: CPT | Performed by: FAMILY MEDICINE

## 2019-11-05 ENCOUNTER — APPOINTMENT (OUTPATIENT)
Dept: LAB | Facility: HOSPITAL | Age: 34
End: 2019-11-05

## 2019-11-05 ENCOUNTER — HOSPITAL ENCOUNTER (OUTPATIENT)
Dept: GENERAL RADIOLOGY | Facility: HOSPITAL | Age: 34
Discharge: HOME | End: 2019-11-05
Admitting: NURSE PRACTITIONER

## 2019-11-05 ENCOUNTER — OFFICE VISIT (OUTPATIENT)
Dept: FAMILY MEDICINE CLINIC | Facility: CLINIC | Age: 34
End: 2019-11-05

## 2019-11-05 VITALS
HEIGHT: 60 IN | OXYGEN SATURATION: 99 % | WEIGHT: 170 LBS | SYSTOLIC BLOOD PRESSURE: 113 MMHG | DIASTOLIC BLOOD PRESSURE: 77 MMHG | BODY MASS INDEX: 33.38 KG/M2 | RESPIRATION RATE: 20 BRPM | TEMPERATURE: 99 F | HEART RATE: 105 BPM

## 2019-11-05 DIAGNOSIS — R50.9 FEVER, UNSPECIFIED FEVER CAUSE: ICD-10-CM

## 2019-11-05 DIAGNOSIS — R05.9 COUGH: ICD-10-CM

## 2019-11-05 DIAGNOSIS — R04.2 HEMOPTYSIS: Primary | ICD-10-CM

## 2019-11-05 LAB
ANION GAP SERPL CALCULATED.3IONS-SCNC: 14.3 MMOL/L (ref 5–15)
B PERT DNA SPEC QL NAA+PROBE: NOT DETECTED
BASOPHILS # BLD AUTO: 0.06 10*3/MM3 (ref 0–0.2)
BASOPHILS NFR BLD AUTO: 0.6 % (ref 0–1.5)
BUN BLD-MCNC: 9 MG/DL (ref 6–20)
BUN/CREAT SERPL: 8.3 (ref 7–25)
C PNEUM DNA NPH QL NAA+NON-PROBE: NOT DETECTED
CALCIUM SPEC-SCNC: 9.4 MG/DL (ref 8.6–10.5)
CHLORIDE SERPL-SCNC: 102 MMOL/L (ref 98–107)
CO2 SERPL-SCNC: 21.7 MMOL/L (ref 22–29)
CREAT BLD-MCNC: 1.08 MG/DL (ref 0.57–1)
DEPRECATED RDW RBC AUTO: 53.3 FL (ref 37–54)
EOSINOPHIL # BLD AUTO: 0.14 10*3/MM3 (ref 0–0.4)
EOSINOPHIL NFR BLD AUTO: 1.3 % (ref 0.3–6.2)
ERYTHROCYTE [DISTWIDTH] IN BLOOD BY AUTOMATED COUNT: 16.5 % (ref 12.3–15.4)
FLUAV H1 2009 PAND RNA NPH QL NAA+PROBE: NOT DETECTED
FLUAV H1 HA GENE NPH QL NAA+PROBE: NOT DETECTED
FLUAV H3 RNA NPH QL NAA+PROBE: NOT DETECTED
FLUAV SUBTYP SPEC NAA+PROBE: NOT DETECTED
FLUBV RNA ISLT QL NAA+PROBE: NOT DETECTED
GFR SERPL CREATININE-BSD FRML MDRD: 57 ML/MIN/1.73
GLUCOSE BLD-MCNC: 82 MG/DL (ref 65–99)
HADV DNA SPEC NAA+PROBE: NOT DETECTED
HCOV 229E RNA SPEC QL NAA+PROBE: NOT DETECTED
HCOV HKU1 RNA SPEC QL NAA+PROBE: NOT DETECTED
HCOV NL63 RNA SPEC QL NAA+PROBE: NOT DETECTED
HCOV OC43 RNA SPEC QL NAA+PROBE: NOT DETECTED
HCT VFR BLD AUTO: 37.4 % (ref 34–46.6)
HGB BLD-MCNC: 11.9 G/DL (ref 12–15.9)
HMPV RNA NPH QL NAA+NON-PROBE: NOT DETECTED
HPIV1 RNA SPEC QL NAA+PROBE: NOT DETECTED
HPIV2 RNA SPEC QL NAA+PROBE: NOT DETECTED
HPIV3 RNA NPH QL NAA+PROBE: NOT DETECTED
HPIV4 P GENE NPH QL NAA+PROBE: NOT DETECTED
IMM GRANULOCYTES # BLD AUTO: 0.07 10*3/MM3 (ref 0–0.05)
IMM GRANULOCYTES NFR BLD AUTO: 0.7 % (ref 0–0.5)
LYMPHOCYTES # BLD AUTO: 1.38 10*3/MM3 (ref 0.7–3.1)
LYMPHOCYTES NFR BLD AUTO: 12.9 % (ref 19.6–45.3)
M PNEUMO IGG SER IA-ACNC: NOT DETECTED
MCH RBC QN AUTO: 27.9 PG (ref 26.6–33)
MCHC RBC AUTO-ENTMCNC: 31.8 G/DL (ref 31.5–35.7)
MCV RBC AUTO: 87.6 FL (ref 79–97)
MONOCYTES # BLD AUTO: 0.59 10*3/MM3 (ref 0.1–0.9)
MONOCYTES NFR BLD AUTO: 5.5 % (ref 5–12)
NEUTROPHILS # BLD AUTO: 8.42 10*3/MM3 (ref 1.7–7)
NEUTROPHILS NFR BLD AUTO: 79 % (ref 42.7–76)
NRBC BLD AUTO-RTO: 0 /100 WBC (ref 0–0.2)
PLATELET # BLD AUTO: 292 10*3/MM3 (ref 140–450)
PMV BLD AUTO: 11.4 FL (ref 6–12)
POTASSIUM BLD-SCNC: 4.1 MMOL/L (ref 3.5–5.2)
RBC # BLD AUTO: 4.27 10*6/MM3 (ref 3.77–5.28)
RHINOVIRUS RNA SPEC NAA+PROBE: NOT DETECTED
RSV RNA NPH QL NAA+NON-PROBE: NOT DETECTED
SODIUM BLD-SCNC: 138 MMOL/L (ref 136–145)
WBC NRBC COR # BLD: 10.66 10*3/MM3 (ref 3.4–10.8)

## 2019-11-05 PROCEDURE — 71046 X-RAY EXAM CHEST 2 VIEWS: CPT

## 2019-11-05 PROCEDURE — 85025 COMPLETE CBC W/AUTO DIFF WBC: CPT | Performed by: NURSE PRACTITIONER

## 2019-11-05 PROCEDURE — 99213 OFFICE O/P EST LOW 20 MIN: CPT | Performed by: NURSE PRACTITIONER

## 2019-11-05 PROCEDURE — 80048 BASIC METABOLIC PNL TOTAL CA: CPT | Performed by: NURSE PRACTITIONER

## 2019-11-05 PROCEDURE — 0099U HC BIOFIRE FILMARRAY RESP PANEL 1: CPT | Performed by: NURSE PRACTITIONER

## 2019-11-05 PROCEDURE — 36415 COLL VENOUS BLD VENIPUNCTURE: CPT | Performed by: NURSE PRACTITIONER

## 2019-11-05 RX ORDER — FLUCONAZOLE 150 MG/1
150 TABLET ORAL ONCE
Qty: 2 TABLET | Refills: 0 | Status: SHIPPED | OUTPATIENT
Start: 2019-11-05 | End: 2019-11-05

## 2019-11-05 RX ORDER — PREDNISONE 10 MG/1
TABLET ORAL
Qty: 20 TABLET | Refills: 0 | OUTPATIENT
Start: 2019-11-05 | End: 2019-11-06

## 2019-11-05 RX ORDER — CEFDINIR 300 MG/1
300 CAPSULE ORAL 2 TIMES DAILY
COMMUNITY
End: 2020-03-16

## 2019-11-05 RX ORDER — FLUTICASONE PROPIONATE 50 MCG
2 SPRAY, SUSPENSION (ML) NASAL DAILY
Qty: 1 BOTTLE | Refills: 2 | Status: SHIPPED | OUTPATIENT
Start: 2019-11-05 | End: 2020-04-27

## 2019-11-05 NOTE — PATIENT INSTRUCTIONS
Water   Mucinex.  Robitussin or delsym for cough  Take steroids in am . Eat with them   Continue your antibioitcs  Take diflucan as prescribed.   Use flonase daily.

## 2019-11-05 NOTE — PROGRESS NOTES
Subjective   Krupa Concepcion is a 34 y.o. female.     Chief Complaint   Patient presents with   • Cough     2nd round antibiotics   • Earache     right       HPI  She has been sick since after harvest homecoming. She had 102 temp next day went t [facility] one week later with sinus inf she completed amox 875 for 7 days . She then developed ear pain 11/1/2019 and went back to [facility] prescribed cefdinir and she was coughing. She has not gotten better. She did get flu shot.   She denies any other fever.   She is using mucinex DM and probiotic. She is taking dimetapp.  Now she is having right ear pain and has hempotysis .   She is coughing until she vomits.     The following portions of the patient's history were reviewed and updated as appropriate: allergies, current medications, past family history, past medical history, past social history, past surgical history and problem list.      Current Outpatient Medications:   •  ARIPiprazole (ABILIFY) 30 MG tablet, Take 30 mg by mouth Every Morning., Disp: , Rfl:   •  cefdinir (OMNICEF) 300 MG capsule, Take 300 mg by mouth 2 (Two) Times a Day., Disp: , Rfl:   •  cetirizine (zyrTEC) 10 MG tablet, Take 10 mg by mouth 2 (Two) Times a Day., Disp: , Rfl:   •  EPINEPHrine (EPIPEN) 0.3 MG/0.3ML solution auto-injector injection, 0.3 mg 1 (One) Time As Needed., Disp: , Rfl:   •  FLUoxetine (PROzac) 20 MG capsule, Take 60 mg by mouth Every Morning., Disp: , Rfl:   •  levothyroxine (SYNTHROID, LEVOTHROID) 50 MCG tablet, Take 50 mcg by mouth Every Morning., Disp: , Rfl: 0  •  Multiple Vitamins-Minerals (MULTIVITAMIN WITH MINERALS) tablet tablet, Take 1 tablet by mouth Daily., Disp: , Rfl:   •  omeprazole (priLOSEC) 40 MG capsule, Take 40 mg by mouth every night at bedtime., Disp: , Rfl:   •  QUEtiapine (SEROquel) 200 MG tablet, Take 200 mg by mouth Every Night., Disp: , Rfl:   •  ROZEREM 8 MG tablet, Take 8 mg by mouth Every Night., Disp: , Rfl:   •  topiramate (TOPAMAX) 200 MG tablet, Take 200  mg by mouth every night at bedtime., Disp: , Rfl:   •  Vitamin D, Cholecalciferol, (CHOLECALCIFEROL) 400 units tablet, Take 2,000 Units by mouth Daily., Disp: , Rfl:   •  fluconazole (DIFLUCAN) 150 MG tablet, Take 1 tablet by mouth 1 (One) Time for 1 dose. Repeat dose 3 days, Disp: 2 tablet, Rfl: 0  •  fluticasone (FLONASE) 50 MCG/ACT nasal spray, 2 sprays into the nostril(s) as directed by provider Daily., Disp: 1 bottle, Rfl: 2  •  predniSONE (DELTASONE) 10 MG tablet, Take 4 tablets by mouth Daily for 2 days, THEN 3 tablets Daily for 2 days, THEN 2 tablets Daily for 2 days, THEN 1 tablet Daily for 2 days., Disp: 20 tablet, Rfl: 0    Recent Results (from the past 4032 hour(s))   Comprehensive Metabolic Panel    Collection Time: 08/12/19 11:32 AM   Result Value Ref Range    Glucose 116 (H) 65 - 99 mg/dL    BUN 12 8 - 20 mg/dL    Creatinine 1.00 0.40 - 1.00 mg/dL    Sodium 133 (L) 136 - 144 mmol/L    Potassium 3.4 (L) 3.6 - 5.1 mmol/L    Chloride 108 101 - 111 mmol/L    CO2 15.0 (L) 22.0 - 32.0 mmol/L    Calcium 8.6 (L) 8.9 - 10.3 mg/dL    Total Protein 6.6 6.1 - 7.9 g/dL    Albumin 4.10 3.50 - 4.80 g/dL    ALT (SGPT) 17 14 - 54 U/L    AST (SGOT) 35 15 - 41 U/L    Alkaline Phosphatase 68 32 - 91 U/L    Total Bilirubin 1.1 0.3 - 1.2 mg/dL    eGFR Non African Amer 63 >60 mL/min/1.73    Globulin 2.5 2.5 - 3.8 gm/dL    A/G Ratio 1.6 1.0 - 1.7 g/dL    BUN/Creatinine Ratio 12.0 5.4 - 26.2    Anion Gap 13.4 5.0 - 15.0 mmol/L   Protime-INR    Collection Time: 08/12/19 11:32 AM   Result Value Ref Range    Protime 10.2 9.6 - 11.7 Seconds    INR 0.99 0.90 - 1.10   aPTT    Collection Time: 08/12/19 11:32 AM   Result Value Ref Range    PTT 18.3 (L) 24.0 - 31.0 seconds   Troponin    Collection Time: 08/12/19 11:32 AM   Result Value Ref Range    Troponin I <0.030 0.000 - 0.030 ng/mL   CBC Auto Differential    Collection Time: 08/12/19 11:32 AM   Result Value Ref Range    WBC 8.20 3.40 - 10.80 10*3/mm3    RBC 4.42 3.77 - 5.28  10*6/mm3    Hemoglobin 12.1 12.0 - 15.9 g/dL    Hematocrit 36.1 34.0 - 46.6 %    MCV 81.7 79.0 - 97.0 fL    MCH 27.5 26.6 - 33.0 pg    MCHC 33.7 31.5 - 35.7 g/dL    RDW 17.4 (H) 12.3 - 15.4 %    RDW-SD 50.3 37.0 - 54.0 fl    MPV 10.2 6.0 - 12.0 fL    Platelets 243 140 - 450 10*3/mm3   Scan Slide    Collection Time: 08/12/19 11:32 AM   Result Value Ref Range    Scan Slide     Manual Differential    Collection Time: 08/12/19 11:32 AM   Result Value Ref Range    Neutrophil % 72.0 42.7 - 76.0 %    Lymphocyte % 20.0 19.6 - 45.3 %    Monocyte % 6.0 5.0 - 12.0 %    Eosinophil % 2.0 0.3 - 6.2 %    Neutrophils Absolute 5.90 1.70 - 7.00 10*3/mm3    Lymphocytes Absolute 1.64 0.70 - 3.10 10*3/mm3    Monocytes Absolute 0.49 0.10 - 0.90 10*3/mm3    Eosinophils Absolute 0.16 0.00 - 0.40 10*3/mm3    Jordi Cells Mod/2+ None Seen    Ovalocytes Large/3+ None Seen    WBC Morphology Normal Normal    Platelet Morphology Normal Normal   Comprehensive Metabolic Panel    Collection Time: 08/14/19 10:41 AM   Result Value Ref Range    Glucose 89 65 - 99 mg/dL    BUN 8 8 - 20 mg/dL    Creatinine 1.10 (H) 0.40 - 1.00 mg/dL    Sodium 136 136 - 144 mmol/L    Potassium 3.3 (L) 3.6 - 5.1 mmol/L    Chloride 106 101 - 111 mmol/L    CO2 18.0 (L) 22.0 - 32.0 mmol/L    Calcium 9.0 8.9 - 10.3 mg/dL    Total Protein 7.2 6.1 - 7.9 g/dL    Albumin 4.40 3.50 - 4.80 g/dL    ALT (SGPT) 20 14 - 54 U/L    AST (SGOT) 23 15 - 41 U/L    Alkaline Phosphatase 72 32 - 91 U/L    Total Bilirubin 1.2 0.3 - 1.2 mg/dL    eGFR Non African Amer 56 (L) >60 mL/min/1.73    Globulin 2.8 2.5 - 3.8 gm/dL    A/G Ratio 1.6 1.0 - 1.7 g/dL    BUN/Creatinine Ratio 7.3 5.4 - 26.2    Anion Gap 15.3 (H) 5.0 - 15.0 mmol/L   D-dimer, Quantitative    Collection Time: 08/14/19 10:41 AM   Result Value Ref Range    D-Dimer, Quantitative 0.17 0.17 - 0.59 MCGFEU/mL   Troponin    Collection Time: 08/14/19 10:41 AM   Result Value Ref Range    Troponin I <0.030 0.000 - 0.030 ng/mL   TSH    Collection  Time: 08/14/19 10:41 AM   Result Value Ref Range    TSH 1.630 0.340 - 5.600 mIU/mL   T4, Free    Collection Time: 08/14/19 10:41 AM   Result Value Ref Range    Free T4 1.07 0.58 - 1.64 ng/dL   T3, Free    Collection Time: 08/14/19 10:41 AM   Result Value Ref Range    T3, Free 3.59 2.39 - 6.79 pg/mL   CBC Auto Differential    Collection Time: 08/14/19 10:42 AM   Result Value Ref Range    WBC 7.80 3.40 - 10.80 10*3/mm3    RBC 4.72 3.77 - 5.28 10*6/mm3    Hemoglobin 13.0 12.0 - 15.9 g/dL    Hematocrit 38.3 34.0 - 46.6 %    MCV 81.2 79.0 - 97.0 fL    MCH 27.6 26.6 - 33.0 pg    MCHC 34.0 31.5 - 35.7 g/dL    RDW 17.5 (H) 12.3 - 15.4 %    RDW-SD 50.3 37.0 - 54.0 fl    MPV 9.7 6.0 - 12.0 fL    Platelets 273 140 - 450 10*3/mm3    Neutrophil % 75.4 42.7 - 76.0 %    Lymphocyte % 18.8 (L) 19.6 - 45.3 %    Monocyte % 4.7 (L) 5.0 - 12.0 %    Eosinophil % 0.6 0.3 - 6.2 %    Basophil % 0.5 0.0 - 1.5 %    Neutrophils, Absolute 5.90 1.70 - 7.00 10*3/mm3    Lymphocytes, Absolute 1.50 0.70 - 3.10 10*3/mm3    Monocytes, Absolute 0.40 0.10 - 0.90 10*3/mm3    Eosinophils, Absolute 0.00 0.00 - 0.40 10*3/mm3    Basophils, Absolute 0.00 0.00 - 0.20 10*3/mm3    nRBC 0.1 0.0 - 0.2 /100 WBC   Troponin    Collection Time: 08/14/19  7:44 PM   Result Value Ref Range    Troponin I <0.030 0.000 - 0.030 ng/mL   Troponin    Collection Time: 08/15/19  7:29 AM   Result Value Ref Range    Troponin I <0.030 0.000 - 0.030 ng/mL   Potassium    Collection Time: 08/15/19  7:29 AM   Result Value Ref Range    Potassium 4.6 3.6 - 5.1 mmol/L   Troponin    Collection Time: 08/15/19 12:45 PM   Result Value Ref Range    Troponin I <0.030 0.000 - 0.030 ng/mL   Stress Test With Myocardial Perfusion One Day    Collection Time: 08/16/19  9:01 AM   Result Value Ref Range    BH CV STRESS PROTOCOL 1 Pharmacologic     Stage 1 1     HR Stage 1 120     BP Stage 1 111/63     Duration Min Stage 1 1     Stress Dose Regadenoson Stage 1 0.4     Stage 2 2     HR Stage 2 118     BP  "Stage 2 120/64     Duration Sec Stage 2 0     Stage 3 3     HR Stage 3 121     BP Stage 3 116/61     Duration Min Stage 3 1     Stage 4 4     HR Stage 4 120     BP Stage 4 114/61     Duration Sec Stage 4 34     Baseline HR 93 bpm    Baseline /63 mmHg    Peak  bpm    Percent Max Pred HR 66.85 %    Percent Target HR 79 %    Peak /64 mmHg    Recovery  bpm    Recovery /61 mmHg    Target HR (85%) 156 bpm    Max. Pred. HR (100%) 184 bpm    Duration Min Stage 2 1     Nuclear Prior Study 3     Nuc Stress EF 72 %         Review of Systems   HENT: Positive for ear pain, postnasal drip and rhinorrhea.         Unable to taste or smell food.   Respiratory: Positive for cough. Negative for wheezing.    Neurological: Positive for headache.       Objective     /77 (BP Location: Left arm, Patient Position: Sitting, Cuff Size: Large Adult)   Pulse 105   Temp 99 °F (37.2 °C) (Oral)   Resp 20   Ht 152.4 cm (60\")   Wt 77.1 kg (170 lb)   SpO2 99%   BMI 33.20 kg/m²     Physical Exam   Constitutional: She appears well-developed and well-nourished.   HENT:   Head: Normocephalic and atraumatic.   Right Ear: External ear normal.   Left Ear: External ear normal.   Nose: Sinus tenderness present. Right sinus exhibits no maxillary sinus tenderness and no frontal sinus tenderness. Left sinus exhibits no maxillary sinus tenderness and no frontal sinus tenderness.   Mouth/Throat: Uvula is midline and mucous membranes are normal. No posterior oropharyngeal edema.   Eyes: Pupils are equal, round, and reactive to light.   Pulmonary/Chest: Effort normal. No respiratory distress. She has rhonchi in the right upper field.         Assessment/Plan   Krupa was seen today for cough and earache.    Diagnoses and all orders for this visit:    Hemoptysis  -     CBC & Differential  -     Basic Metabolic Panel  -     XR Chest 2 View    Fever, unspecified fever cause  -     CBC & Differential  -     Basic Metabolic " Panel  -     XR Chest 2 View  -     Respiratory Panel, PCR - Swab, Nasopharynx    Cough  -     CBC & Differential  -     Basic Metabolic Panel  -     XR Chest 2 View  -     Respiratory Panel, PCR - Swab, Nasopharynx    Other orders  -     predniSONE (DELTASONE) 10 MG tablet; Take 4 tablets by mouth Daily for 2 days, THEN 3 tablets Daily for 2 days, THEN 2 tablets Daily for 2 days, THEN 1 tablet Daily for 2 days.  -     fluconazole (DIFLUCAN) 150 MG tablet; Take 1 tablet by mouth 1 (One) Time for 1 dose. Repeat dose 3 days  -     fluticasone (FLONASE) 50 MCG/ACT nasal spray; 2 sprays into the nostril(s) as directed by provider Daily.      Patient Instructions   Water   Mucinex.  Robitussin or delsym for cough  Take steroids in am . Eat with them   Continue your antibioitcs  Take diflucan as prescribed.   Use flonase daily.        Neli May, APRN    11/05/19

## 2019-11-06 ENCOUNTER — TELEPHONE (OUTPATIENT)
Dept: FAMILY MEDICINE CLINIC | Facility: CLINIC | Age: 34
End: 2019-11-06

## 2019-11-06 ENCOUNTER — APPOINTMENT (OUTPATIENT)
Dept: GENERAL RADIOLOGY | Facility: HOSPITAL | Age: 34
End: 2019-11-06

## 2019-11-06 ENCOUNTER — HOSPITAL ENCOUNTER (EMERGENCY)
Facility: HOSPITAL | Age: 34
Discharge: HOME | End: 2019-11-06
Admitting: EMERGENCY MEDICINE

## 2019-11-06 VITALS
HEART RATE: 111 BPM | OXYGEN SATURATION: 98 % | RESPIRATION RATE: 16 BRPM | BODY MASS INDEX: 27.49 KG/M2 | HEIGHT: 65 IN | WEIGHT: 165 LBS | TEMPERATURE: 98.5 F | SYSTOLIC BLOOD PRESSURE: 122 MMHG | DIASTOLIC BLOOD PRESSURE: 83 MMHG

## 2019-11-06 DIAGNOSIS — B34.9 VIRAL ILLNESS: Primary | ICD-10-CM

## 2019-11-06 DIAGNOSIS — R09.1 PLEURISY: ICD-10-CM

## 2019-11-06 LAB
FLUAV SUBTYP SPEC NAA+PROBE: NOT DETECTED
FLUBV RNA ISLT QL NAA+PROBE: NOT DETECTED

## 2019-11-06 PROCEDURE — 99283 EMERGENCY DEPT VISIT LOW MDM: CPT

## 2019-11-06 PROCEDURE — 71046 X-RAY EXAM CHEST 2 VIEWS: CPT

## 2019-11-06 PROCEDURE — 87502 INFLUENZA DNA AMP PROBE: CPT | Performed by: NURSE PRACTITIONER

## 2019-11-06 RX ORDER — IBUPROFEN 800 MG/1
800 TABLET ORAL EVERY 6 HOURS PRN
Qty: 30 TABLET | Refills: 0 | Status: SHIPPED | OUTPATIENT
Start: 2019-11-06 | End: 2020-03-16

## 2019-11-06 NOTE — ED PROVIDER NOTES
"Subjective   Chief Complaint: cough  Context: Patient reports she has been sick for 3 weeks, has completed 10 days of amoxicillin and is currently on Omnicef for 7 days.  She reports that she was being treated for a \"bacterial infection.  She reports continued fever up to 101, productive cough, and the right side of her chest hurts when she coughs or takes a deep breath.  No vomiting or diarrhea.  Duration: 3 weeks  Timing: Intermittent   severity: Mild  Associated symptoms and or modifying factors: As above.  Works at [employer].  Concerned that she has the flu or pneumonia.          History provided by:  Patient      Review of Systems   Constitutional: Positive for chills and fever.   HENT: Positive for congestion. Negative for sore throat.    Eyes: Negative for redness.   Respiratory: Positive for cough. Negative for shortness of breath.    Cardiovascular: Negative for chest pain.   Gastrointestinal: Positive for diarrhea. Negative for abdominal pain, nausea and vomiting.   Genitourinary: Negative for dysuria.   Musculoskeletal: Negative for back pain.        Chest wall pain   Skin: Negative for rash.   Neurological: Negative for headaches.   All other systems reviewed and are negative.      Past Medical History:   Diagnosis Date   • Anxiety    • Arrhythmia    • Asthma    • Chest pain 8/14/2019   • Depression    • Disease of thyroid gland    • GERD (gastroesophageal reflux disease)    • Migraine    • Schizoaffective disorder (CMS/HCC)        Allergies   Allergen Reactions   • Parabens Anaphylaxis   • Tea Tree Oil Anaphylaxis       Past Surgical History:   Procedure Laterality Date   • COLONOSCOPY     • DENTAL PROCEDURE     • EAR TUBES Bilateral     x2 times   • ENDOSCOPY     • TONSILLECTOMY         Family History   Problem Relation Age of Onset   • Hypertension Mother    • Migraines Mother    • Osteoarthritis Mother    • Hyperlipidemia Father    • Hypertension Father        Social History     Socioeconomic " History   • Marital status:      Spouse name: Not on file   • Number of children: Not on file   • Years of education: Not on file   • Highest education level: Not on file   Tobacco Use   • Smoking status: Never Smoker   • Smokeless tobacco: Never Used   Substance and Sexual Activity   • Alcohol use: Yes     Alcohol/week: 1.2 oz     Types: 2 Glasses of wine per week     Frequency: Never   • Drug use: No   • Sexual activity: Defer     Birth control/protection: Implant           Objective   Physical Exam  The patient is well-developed, well-nourished, alert, cooperative and in no acute distress.    HEENT exam is normocephalic and atraumatic. Pupils equal ,round, reactive to light. Extraocular muscles are intact. Conjunctivae non- injected, sclerae anicteric, lids without ptosis, edema or erythema. External auditory canals and tympanic membranes are clear. No nasal drainage.  Sinuses non-tender. Mucous membranes are moist. No inflammation, swelling, exudate, or lesions of posterior pharynx or mouth.    Neck is supple, non-tender without lymphadenopathy. No JVD, bruit or stridor noted.     Lungs clear to auscultation bilaterally.    Cardiovascular. Regular rate and rhythm    Chest is tender, no subcutaneous emphysema or crepitus felt.     Abdomen is soft, nontender, nondistended. No guarding or rebound noted.     Back shows no CVA tenderness.     Extremities: There is no significant deformity or joint abnormality. No edema.     Neurologic: Oriented x3 without focal neurological deficits.    Skin shows no rash, petechiae, or purpura.    Procedures           ED Course        Labs Reviewed   INFLUENZA ANTIGEN, RAPID - Normal     Xr Chest 2 View    Result Date: 11/6/2019  No acute process.  Electronically Signed By-Chito Bynum On:11/6/2019 7:55 PM This report was finalized on [id number] by  Chito Bynum, .    Xr Chest 2 View    Result Date: 11/5/2019  No acute chest finding.  Electronically Signed By-Dr. Alegria  "MD Gavino On:11/5/2019 10:22 AM This report was finalized on [id number] by Dr. Airam Mancia MD.    Medications - No data to display  /82 (BP Location: Left arm, Patient Position: Sitting)   Pulse 117   Temp 98.5 °F (36.9 °C) (Oral)   Resp 20   Ht 165.1 cm (65\")   Wt 74.8 kg (165 lb)   SpO2 98%   BMI 27.46 kg/m²             MDM  Number of Diagnoses or Management Options  Pleurisy:   Viral illness:   Diagnosis management comments: MEDICAL DECISION  Comorbidities: Noted in past history  Differentials: Influenza, pneumonia, viral illness; this list is not all inclusive and does not constitute the entirety of considered causes  Radiology interpretation:  Images reviewed by me and interpreted by radiologist, chest x-ray shows no infiltrate  Lab interpretation:  Labs viewed by me significant for, influenza is negative    Results and plan for discharge were discussed.  Prescription for ibuprofen         Amount and/or Complexity of Data Reviewed  Clinical lab tests: reviewed and ordered  Tests in the radiology section of CPT®: reviewed and ordered        Final diagnoses:   Viral illness   Pleurisy              Lora Hood, APRN  11/06/19 2012    "

## 2019-11-07 NOTE — DISCHARGE INSTRUCTIONS
Medication as prescribed.  Continue current cough medication if needed.  Follow up with your doctor

## 2019-11-12 ENCOUNTER — APPOINTMENT (OUTPATIENT)
Dept: GENERAL RADIOLOGY | Facility: HOSPITAL | Age: 34
End: 2019-11-12

## 2019-11-12 ENCOUNTER — HOSPITAL ENCOUNTER (EMERGENCY)
Facility: HOSPITAL | Age: 34
Discharge: HOME | End: 2019-11-12
Attending: EMERGENCY MEDICINE | Admitting: EMERGENCY MEDICINE

## 2019-11-12 ENCOUNTER — APPOINTMENT (OUTPATIENT)
Dept: CT IMAGING | Facility: HOSPITAL | Age: 34
End: 2019-11-12

## 2019-11-12 VITALS
HEART RATE: 85 BPM | RESPIRATION RATE: 15 BRPM | BODY MASS INDEX: 35.24 KG/M2 | TEMPERATURE: 98.3 F | WEIGHT: 174.82 LBS | DIASTOLIC BLOOD PRESSURE: 82 MMHG | SYSTOLIC BLOOD PRESSURE: 117 MMHG | OXYGEN SATURATION: 99 % | HEIGHT: 59 IN

## 2019-11-12 DIAGNOSIS — R55 SYNCOPE, UNSPECIFIED SYNCOPE TYPE: Primary | ICD-10-CM

## 2019-11-12 LAB
ALBUMIN SERPL-MCNC: 4.5 G/DL (ref 3.5–5.2)
ALBUMIN/GLOB SERPL: 1.4 G/DL
ALP SERPL-CCNC: 90 U/L (ref 39–117)
ALT SERPL W P-5'-P-CCNC: 18 U/L (ref 1–33)
ANION GAP SERPL CALCULATED.3IONS-SCNC: 13 MMOL/L (ref 5–15)
AST SERPL-CCNC: 21 U/L (ref 1–32)
B-HCG UR QL: NEGATIVE
BASOPHILS # BLD AUTO: 0.1 10*3/MM3 (ref 0–0.2)
BASOPHILS NFR BLD AUTO: 0.6 % (ref 0–1.5)
BILIRUB SERPL-MCNC: 0.6 MG/DL (ref 0.2–1.2)
BILIRUB UR QL STRIP: NEGATIVE
BUN BLD-MCNC: 6 MG/DL (ref 6–20)
BUN/CREAT SERPL: 7.5 (ref 7–25)
CALCIUM SPEC-SCNC: 9.4 MG/DL (ref 8.6–10.5)
CHLORIDE SERPL-SCNC: 106 MMOL/L (ref 98–107)
CLARITY UR: ABNORMAL
CO2 SERPL-SCNC: 24 MMOL/L (ref 22–29)
COLOR UR: YELLOW
CREAT BLD-MCNC: 0.8 MG/DL (ref 0.57–1)
D DIMER PPP FEU-MCNC: 0.3 MCGFEU/ML (ref 0.17–0.59)
DEPRECATED RDW RBC AUTO: 53.4 FL (ref 37–54)
EOSINOPHIL # BLD AUTO: 0.1 10*3/MM3 (ref 0–0.4)
EOSINOPHIL NFR BLD AUTO: 1.2 % (ref 0.3–6.2)
ERYTHROCYTE [DISTWIDTH] IN BLOOD BY AUTOMATED COUNT: 18.2 % (ref 12.3–15.4)
FLUAV SUBTYP SPEC NAA+PROBE: NOT DETECTED
FLUBV RNA ISLT QL NAA+PROBE: NOT DETECTED
GFR SERPL CREATININE-BSD FRML MDRD: 81 ML/MIN/1.73
GLOBULIN UR ELPH-MCNC: 3.3 GM/DL
GLUCOSE BLD-MCNC: 77 MG/DL (ref 65–99)
GLUCOSE UR STRIP-MCNC: NEGATIVE MG/DL
HCT VFR BLD AUTO: 36 % (ref 34–46.6)
HGB BLD-MCNC: 12.2 G/DL (ref 12–15.9)
HGB UR QL STRIP.AUTO: NEGATIVE
KETONES UR QL STRIP: NEGATIVE
LEUKOCYTE ESTERASE UR QL STRIP.AUTO: NEGATIVE
LYMPHOCYTES # BLD AUTO: 1.4 10*3/MM3 (ref 0.7–3.1)
LYMPHOCYTES NFR BLD AUTO: 16.5 % (ref 19.6–45.3)
MAGNESIUM SERPL-MCNC: 2.5 MG/DL (ref 1.6–2.6)
MCH RBC QN AUTO: 28.4 PG (ref 26.6–33)
MCHC RBC AUTO-ENTMCNC: 33.8 G/DL (ref 31.5–35.7)
MCV RBC AUTO: 84 FL (ref 79–97)
MONOCYTES # BLD AUTO: 0.6 10*3/MM3 (ref 0.1–0.9)
MONOCYTES NFR BLD AUTO: 6.8 % (ref 5–12)
NEUTROPHILS # BLD AUTO: 6.3 10*3/MM3 (ref 1.7–7)
NEUTROPHILS NFR BLD AUTO: 74.9 % (ref 42.7–76)
NITRITE UR QL STRIP: NEGATIVE
NRBC BLD AUTO-RTO: 0.1 /100 WBC (ref 0–0.2)
PH UR STRIP.AUTO: 7 [PH] (ref 5–8)
PLATELET # BLD AUTO: 235 10*3/MM3 (ref 140–450)
PMV BLD AUTO: 9.2 FL (ref 6–12)
POTASSIUM BLD-SCNC: 3.5 MMOL/L (ref 3.5–5.2)
PROT SERPL-MCNC: 7.8 G/DL (ref 6–8.5)
PROT UR QL STRIP: NEGATIVE
RBC # BLD AUTO: 4.29 10*6/MM3 (ref 3.77–5.28)
SODIUM BLD-SCNC: 143 MMOL/L (ref 136–145)
SP GR UR STRIP: <=1.005 (ref 1–1.03)
TROPONIN T SERPL-MCNC: <0.01 NG/ML (ref 0–0.03)
TSH SERPL DL<=0.05 MIU/L-ACNC: 0.76 UIU/ML (ref 0.27–4.2)
UROBILINOGEN UR QL STRIP: ABNORMAL
WBC NRBC COR # BLD: 8.4 10*3/MM3 (ref 3.4–10.8)

## 2019-11-12 PROCEDURE — 81003 URINALYSIS AUTO W/O SCOPE: CPT | Performed by: EMERGENCY MEDICINE

## 2019-11-12 PROCEDURE — 99284 EMERGENCY DEPT VISIT MOD MDM: CPT

## 2019-11-12 PROCEDURE — 36415 COLL VENOUS BLD VENIPUNCTURE: CPT

## 2019-11-12 PROCEDURE — 84443 ASSAY THYROID STIM HORMONE: CPT | Performed by: EMERGENCY MEDICINE

## 2019-11-12 PROCEDURE — 85379 FIBRIN DEGRADATION QUANT: CPT | Performed by: EMERGENCY MEDICINE

## 2019-11-12 PROCEDURE — 93005 ELECTROCARDIOGRAM TRACING: CPT | Performed by: EMERGENCY MEDICINE

## 2019-11-12 PROCEDURE — 70450 CT HEAD/BRAIN W/O DYE: CPT

## 2019-11-12 PROCEDURE — 85025 COMPLETE CBC W/AUTO DIFF WBC: CPT | Performed by: EMERGENCY MEDICINE

## 2019-11-12 PROCEDURE — 80053 COMPREHEN METABOLIC PANEL: CPT | Performed by: EMERGENCY MEDICINE

## 2019-11-12 PROCEDURE — 99283 EMERGENCY DEPT VISIT LOW MDM: CPT

## 2019-11-12 PROCEDURE — 71045 X-RAY EXAM CHEST 1 VIEW: CPT

## 2019-11-12 PROCEDURE — 84484 ASSAY OF TROPONIN QUANT: CPT | Performed by: EMERGENCY MEDICINE

## 2019-11-12 PROCEDURE — 87502 INFLUENZA DNA AMP PROBE: CPT | Performed by: EMERGENCY MEDICINE

## 2019-11-12 PROCEDURE — 81025 URINE PREGNANCY TEST: CPT | Performed by: EMERGENCY MEDICINE

## 2019-11-12 PROCEDURE — 93005 ELECTROCARDIOGRAM TRACING: CPT | Performed by: FAMILY MEDICINE

## 2019-11-12 PROCEDURE — 87040 BLOOD CULTURE FOR BACTERIA: CPT | Performed by: EMERGENCY MEDICINE

## 2019-11-12 PROCEDURE — 83735 ASSAY OF MAGNESIUM: CPT | Performed by: EMERGENCY MEDICINE

## 2019-11-12 RX ORDER — SODIUM CHLORIDE 0.9 % (FLUSH) 0.9 %
10 SYRINGE (ML) INJECTION AS NEEDED
Status: DISCONTINUED | OUTPATIENT
Start: 2019-11-12 | End: 2019-11-12 | Stop reason: HOSPADM

## 2019-11-12 RX ADMIN — SODIUM CHLORIDE 1000 ML: 900 INJECTION, SOLUTION INTRAVENOUS at 16:35

## 2019-11-12 RX ADMIN — SODIUM CHLORIDE 1000 ML: 900 INJECTION, SOLUTION INTRAVENOUS at 14:31

## 2019-11-12 NOTE — DISCHARGE INSTRUCTIONS
Rest plenty of fluids.  Follow-up family doctor tomorrow.  Return for recurrence of symptoms fever increasing increasing vomiting black or bloody stool bloody vomitus uncontrolled pain seizure activity or any other new responds or concerns.  Off work next 2 days.

## 2019-11-12 NOTE — ED PROVIDER NOTES
Subjective   Complaint passed out    History of present illness 34-year-old female who was at work today and she passed out.  She states she is had cough congestion and was running a fever of 102.  Passed out briefly was no reported seizure activity she states this is the fourth time she is passed out in 2 weeks.  She states she gets no warning.  She denies any vomiting she has had some diarrhea.  Cough is been nonproductive.  Denies any foreign travels she has been on antibiotics.  No leg pain or swelling.  And no significant injury.  No headache speech difficulty or paralysis chest pain or shortness of breath.  Nothing makes better worse is been intermittent a couple weeks for times now.            Review of Systems   Constitutional: Positive for fever. Negative for chills.   HENT: Positive for congestion. Negative for sinus pressure.    Eyes: Negative for photophobia and visual disturbance.   Respiratory: Negative for chest tightness and shortness of breath.    Gastrointestinal: Negative for abdominal pain and vomiting.   Endocrine: Negative for cold intolerance.   Genitourinary: Negative for difficulty urinating and dysuria.   Musculoskeletal: Negative for arthralgias and back pain.   Skin: Negative for color change and pallor.   Neurological: Positive for light-headedness. Negative for dizziness, facial asymmetry and speech difficulty.   Psychiatric/Behavioral: Negative for agitation and behavioral problems.       Past Medical History:   Diagnosis Date   • Anxiety    • Arrhythmia    • Asthma    • Chest pain 8/14/2019   • Depression    • Disease of thyroid gland    • GERD (gastroesophageal reflux disease)    • Migraine    • Schizoaffective disorder (CMS/HCC)        Allergies   Allergen Reactions   • Parabens Anaphylaxis   • Tea Tree Oil Anaphylaxis   • Cefdinir Hallucinations       Past Surgical History:   Procedure Laterality Date   • COLONOSCOPY     • DENTAL PROCEDURE     • EAR TUBES Bilateral     x2 times   •  ENDOSCOPY     • TONSILLECTOMY         Family History   Problem Relation Age of Onset   • Hypertension Mother    • Migraines Mother    • Osteoarthritis Mother    • Hyperlipidemia Father    • Hypertension Father        Social History     Socioeconomic History   • Marital status:      Spouse name: Not on file   • Number of children: Not on file   • Years of education: Not on file   • Highest education level: Not on file   Tobacco Use   • Smoking status: Never Smoker   • Smokeless tobacco: Never Used   Substance and Sexual Activity   • Alcohol use: Yes     Alcohol/week: 1.2 oz     Types: 2 Glasses of wine per week     Frequency: Never   • Drug use: No   • Sexual activity: Defer     Birth control/protection: Implant     Prior to Admission medications    Medication Sig Start Date End Date Taking? Authorizing Provider   ARIPiprazole (ABILIFY) 30 MG tablet Take 30 mg by mouth Every Morning. 6/26/19   Emergency, Nurse MANOJ Houston   cefdinir (OMNICEF) 300 MG capsule Take 300 mg by mouth 2 (Two) Times a Day.    ProviderMaryann MD   cetirizine (zyrTEC) 10 MG tablet Take 10 mg by mouth 2 (Two) Times a Day.    ProviderMaryann MD   EPINEPHrine (EPIPEN) 0.3 MG/0.3ML solution auto-injector injection 0.3 mg 1 (One) Time As Needed. 10/4/12   Emergency, Nurse MANOJ Houston   FLUoxetine (PROzac) 20 MG capsule Take 60 mg by mouth Every Morning.    ProviderMaryann MD   fluticasone (FLONASE) 50 MCG/ACT nasal spray 2 sprays into the nostril(s) as directed by provider Daily. 11/5/19   Neli May APRN   ibuprofen (ADVIL,MOTRIN) 800 MG tablet Take 1 tablet by mouth Every 6 (Six) Hours As Needed for Moderate Pain . 11/6/19   Lora Hood APRN   levothyroxine (SYNTHROID, LEVOTHROID) 50 MCG tablet Take 50 mcg by mouth Every Morning. 4/29/19   Emergency, Nurse Epic, RN   Multiple Vitamins-Minerals (MULTIVITAMIN WITH MINERALS) tablet tablet Take 1 tablet by mouth Daily.    ProviderMaryann MD   omeprazole  (priLOSEC) 40 MG capsule Take 40 mg by mouth every night at bedtime.    Provider, MD Maryann   QUEtiapine (SEROquel) 200 MG tablet Take 200 mg by mouth Every Night. 6/26/19   Emergency, Nurse Epic, RN   ROZEREM 8 MG tablet Take 8 mg by mouth Every Night. 6/26/19   Emergency, Nurse MANOJ Houston   topiramate (TOPAMAX) 200 MG tablet Take 200 mg by mouth every night at bedtime. 6/26/19   Emergency, Nurse Epic, RN   Vitamin D, Cholecalciferol, (CHOLECALCIFEROL) 400 units tablet Take 2,000 Units by mouth Daily.    Provider, MD Maryann           Objective   Physical Exam  34-year-old female awake alert no acute distress HEENT extraocular muscles intact pupils equal and reactive no photophobia disks are sharp mouth is clear neck is supple no adenopathy no meningeal signs no JVD no bruits lungs clear no retraction no use of accessories heart regular without murmur and was soft without tenderness no masses extremities pulses are equal throughout upper and lower extremities no edema cords or Homans sign she has a contusions no anterior shins but no pain she moves everything without difficulty.  She is no Pap cords or Homans sign she is awake alert orientated x4 no facial asymmetry normal speech no drift normal finger-to-nose without focal weakness.  She has no nystagmus  Procedures           ED Course       Results for orders placed or performed during the hospital encounter of 11/12/19   Influenza Antigen, Rapid - Swab, Nasopharynx   Result Value Ref Range    Influenza A PCR Not Detected Not Detected    Influenza B PCR Not Detected Not Detected   Comprehensive Metabolic Panel   Result Value Ref Range    Glucose 77 65 - 99 mg/dL    BUN 6 6 - 20 mg/dL    Creatinine 0.80 0.57 - 1.00 mg/dL    Sodium 143 136 - 145 mmol/L    Potassium 3.5 3.5 - 5.2 mmol/L    Chloride 106 98 - 107 mmol/L    CO2 24.0 22.0 - 29.0 mmol/L    Calcium 9.4 8.6 - 10.5 mg/dL    Total Protein 7.8 6.0 - 8.5 g/dL    Albumin 4.50 3.50 - 5.20 g/dL    ALT  (SGPT) 18 1 - 33 U/L    AST (SGOT) 21 1 - 32 U/L    Alkaline Phosphatase 90 39 - 117 U/L    Total Bilirubin 0.6 0.2 - 1.2 mg/dL    eGFR Non African Amer 81 >60 mL/min/1.73    Globulin 3.3 gm/dL    A/G Ratio 1.4 g/dL    BUN/Creatinine Ratio 7.5 7.0 - 25.0    Anion Gap 13.0 5.0 - 15.0 mmol/L   Troponin   Result Value Ref Range    Troponin T <0.010 0.000 - 0.030 ng/mL   D-dimer, Quantitative   Result Value Ref Range    D-Dimer, Quantitative 0.30 0.17 - 0.59 MCGFEU/mL   Magnesium   Result Value Ref Range    Magnesium 2.5 1.6 - 2.6 mg/dL   TSH   Result Value Ref Range    TSH 0.764 0.270 - 4.200 uIU/mL   CBC Auto Differential   Result Value Ref Range    WBC 8.40 3.40 - 10.80 10*3/mm3    RBC 4.29 3.77 - 5.28 10*6/mm3    Hemoglobin 12.2 12.0 - 15.9 g/dL    Hematocrit 36.0 34.0 - 46.6 %    MCV 84.0 79.0 - 97.0 fL    MCH 28.4 26.6 - 33.0 pg    MCHC 33.8 31.5 - 35.7 g/dL    RDW 18.2 (H) 12.3 - 15.4 %    RDW-SD 53.4 37.0 - 54.0 fl    MPV 9.2 6.0 - 12.0 fL    Platelets 235 140 - 450 10*3/mm3    Neutrophil % 74.9 42.7 - 76.0 %    Lymphocyte % 16.5 (L) 19.6 - 45.3 %    Monocyte % 6.8 5.0 - 12.0 %    Eosinophil % 1.2 0.3 - 6.2 %    Basophil % 0.6 0.0 - 1.5 %    Neutrophils, Absolute 6.30 1.70 - 7.00 10*3/mm3    Lymphocytes, Absolute 1.40 0.70 - 3.10 10*3/mm3    Monocytes, Absolute 0.60 0.10 - 0.90 10*3/mm3    Eosinophils, Absolute 0.10 0.00 - 0.40 10*3/mm3    Basophils, Absolute 0.10 0.00 - 0.20 10*3/mm3    nRBC 0.1 0.0 - 0.2 /100 WBC   Pregnancy, Urine - Urine, Clean Catch   Result Value Ref Range    HCG, Urine QL Negative Negative   Urinalysis With Culture If Indicated - Urine, Clean Catch   Result Value Ref Range    Color, UA Yellow Yellow, Straw    Appearance, UA Cloudy (A) Clear    pH, UA 7.0 5.0 - 8.0    Specific Gravity, UA <=1.005 1.005 - 1.030    Glucose, UA Negative Negative    Ketones, UA Negative Negative    Bilirubin, UA Negative Negative    Blood, UA Negative Negative    Protein, UA Negative Negative    Leuk Esterase,  UA Negative Negative    Nitrite, UA Negative Negative    Urobilinogen, UA 0.2 E.U./dL 0.2 - 1.0 E.U./dL     Ct Head Without Contrast    Result Date: 11/12/2019  No evidence of hemorrhage, mass effect or midline shift. No acute process identified.  Electronically Signed By-Adelita Velásquez On:11/12/2019 4:19 PM This report was finalized on [id number] by  Adelita Velásquez, .    Xr Chest 1 View    Result Date: 11/12/2019  Borderline cardiomegaly. No acute cardiopulmonary abnormality.  Electronically Signed By-Constance Maurer MD On:11/12/2019 3:25 PM This report was finalized on [id number] by  Constance Maurer MD.    Medications   sodium chloride 0.9 % flush 10 mL (not administered)   sodium chloride 0.9 % bolus 1,000 mL (1,000 mL Intravenous New Bag 11/12/19 1635)   sodium chloride 0.9 % bolus 1,000 mL (0 mL Intravenous Stopped 11/12/19 1501)     EKG my interpretation normal sinus rhythm rate 100 normal axis no hypertrophy QTC of 457 normal EKG              MDM  Number of Diagnoses or Management Options  Syncope, unspecified syncope type:   Diagnosis management comments: Medical decision making.  Patient IV normal saline x2 L placed on a monitor and had the above exam and evaluation.  Patient's EKG was normal chemistries electrolytes magnesium troponin all negative d-dimer negative.  TSH normal.  The patient had no further dysrhythmia on the monitor heart rate was 90.  Blood pressure 120/60.  She had no pain.  Ramal soft without tenderness no peritoneal findings she had no dysrhythmias no syncopal episodes no seizures she got up she walked at bedside commode she had no problems with that.  No ataxia.  No focal neurological deficits with an unremarkable neurological exam and I see no evidence of an acute stroke acute cardiac ischemia or dysrhythmia.  No evidence of acute DVT pulmonary embolism right dissection or sepsis.  Patient was admitted back in August for syncopal episode she had a negative stress testing echocardiograms and  Holter monitors.  The patient reports having diarrhea but she is had none here.  Talked about C. difficile we talked about antibiotic use which she looks well and she will be discharged home for outpatient management and follow-up in the office.  We did talk about what to return for      Final diagnoses:   Syncope, unspecified syncope type              Wilmer Rocha MD  11/12/19 3626

## 2019-11-17 LAB
BACTERIA SPEC AEROBE CULT: NORMAL
BACTERIA SPEC AEROBE CULT: NORMAL

## 2019-12-02 RX ORDER — LEVOTHYROXINE SODIUM 0.05 MG/1
TABLET ORAL
Qty: 30 TABLET | Refills: 1 | Status: SHIPPED | OUTPATIENT
Start: 2019-12-02 | End: 2020-02-26

## 2019-12-16 ENCOUNTER — OFFICE (OUTPATIENT)
Dept: URBAN - METROPOLITAN AREA CLINIC 64 | Facility: CLINIC | Age: 34
End: 2019-12-16
Payer: MEDICAID

## 2019-12-16 VITALS
DIASTOLIC BLOOD PRESSURE: 83 MMHG | SYSTOLIC BLOOD PRESSURE: 110 MMHG | HEART RATE: 99 BPM | HEIGHT: 59 IN | WEIGHT: 176 LBS

## 2019-12-16 DIAGNOSIS — K21.9 GASTRO-ESOPHAGEAL REFLUX DISEASE WITHOUT ESOPHAGITIS: ICD-10-CM

## 2019-12-16 PROCEDURE — 99213 OFFICE O/P EST LOW 20 MIN: CPT | Performed by: NURSE PRACTITIONER

## 2019-12-16 RX ORDER — OMEPRAZOLE 40 MG/1
40 CAPSULE, DELAYED RELEASE ORAL
Qty: 90 | Refills: 3 | Status: ACTIVE

## 2020-02-21 ENCOUNTER — OFFICE VISIT (OUTPATIENT)
Dept: FAMILY MEDICINE CLINIC | Facility: CLINIC | Age: 35
End: 2020-02-21

## 2020-02-21 VITALS
BODY MASS INDEX: 35.74 KG/M2 | RESPIRATION RATE: 18 BRPM | TEMPERATURE: 98.1 F | SYSTOLIC BLOOD PRESSURE: 105 MMHG | OXYGEN SATURATION: 100 % | DIASTOLIC BLOOD PRESSURE: 74 MMHG | HEART RATE: 95 BPM | WEIGHT: 177.3 LBS | HEIGHT: 59 IN

## 2020-02-21 DIAGNOSIS — J02.9 SORE THROAT: Primary | ICD-10-CM

## 2020-02-21 LAB
EXPIRATION DATE: NORMAL
INTERNAL CONTROL: NORMAL
Lab: NORMAL
S PYO AG THROAT QL: NEGATIVE

## 2020-02-21 PROCEDURE — 87880 STREP A ASSAY W/OPTIC: CPT | Performed by: NURSE PRACTITIONER

## 2020-02-21 PROCEDURE — 99212 OFFICE O/P EST SF 10 MIN: CPT | Performed by: NURSE PRACTITIONER

## 2020-02-21 NOTE — PROGRESS NOTES
Subjective   Krupa Concepcion is a 34 y.o. female.     Chief Complaint   Patient presents with   • Sore Throat     x 4 days        HPI  She is here for sore throat 4 days. She has had chills. recenlty had flu and was treated, that seems gone. She is not coughing.   She is eating and drinking no voice change. 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   She is concerned she has strep.     Sore throat- 4 days no fever. No cough recently had flu. Is able to swallow easily. No  hoarsness ..      The following portions of the patient's history were reviewed and updated as appropriate: allergies, current medications, past family history, past medical history, past social history, past surgical history and problem list.      Current Outpatient Medications:   •  ARIPiprazole (ABILIFY) 30 MG tablet, Take 30 mg by mouth Every Morning., Disp: , Rfl:   •  cetirizine (zyrTEC) 10 MG tablet, Take 10 mg by mouth 2 (Two) Times a Day., Disp: , Rfl:   •  EPINEPHrine (EPIPEN) 0.3 MG/0.3ML solution auto-injector injection, 0.3 mg 1 (One) Time As Needed., Disp: , Rfl:   •  FLUoxetine (PROzac) 20 MG capsule, Take 60 mg by mouth Every Morning., Disp: , Rfl:   •  ibuprofen (ADVIL,MOTRIN) 800 MG tablet, Take 1 tablet by mouth Every 6 (Six) Hours As Needed for Moderate Pain ., Disp: 30 tablet, Rfl: 0  •  levothyroxine (SYNTHROID, LEVOTHROID) 50 MCG tablet, TAKE 1 TABLET BY MOUTH ONCE DAILY, Disp: 30 tablet, Rfl: 1  •  Multiple Vitamins-Minerals (MULTIVITAMIN WITH MINERALS) tablet tablet, Take 1 tablet by mouth Daily., Disp: , Rfl:   •  omeprazole (priLOSEC) 40 MG capsule, Take 40 mg by mouth every night at bedtime., Disp: , Rfl:   •  QUEtiapine (SEROquel) 200 MG tablet, Take 200 mg by mouth Every Night., Disp: , Rfl:   •  ROZEREM 8 MG tablet, Take 8 mg by mouth Every Night., Disp: , Rfl:   •  topiramate (TOPAMAX) 200 MG tablet, Take 200 mg by mouth every night at bedtime., Disp: , Rfl:   •  Vitamin D, Cholecalciferol,  (CHOLECALCIFEROL) 400 units tablet, Take 2,000 Units by mouth Daily., Disp: , Rfl:   •  cefdinir (OMNICEF) 300 MG capsule, Take 300 mg by mouth 2 (Two) Times a Day., Disp: , Rfl:   •  fluticasone (FLONASE) 50 MCG/ACT nasal spray, 2 sprays into the nostril(s) as directed by provider Daily., Disp: 1 bottle, Rfl: 2    Recent Results (from the past 4032 hour(s))   Basic Metabolic Panel    Collection Time: 11/05/19  9:56 AM   Result Value Ref Range    Glucose 82 65 - 99 mg/dL    BUN 9 6 - 20 mg/dL    Creatinine 1.08 (H) 0.57 - 1.00 mg/dL    Sodium 138 136 - 145 mmol/L    Potassium 4.1 3.5 - 5.2 mmol/L    Chloride 102 98 - 107 mmol/L    CO2 21.7 (L) 22.0 - 29.0 mmol/L    Calcium 9.4 8.6 - 10.5 mg/dL    eGFR Non African Amer 57 (L) >60 mL/min/1.73    BUN/Creatinine Ratio 8.3 7.0 - 25.0    Anion Gap 14.3 5.0 - 15.0 mmol/L   Respiratory Panel, PCR - Swab, Nasopharynx    Collection Time: 11/05/19  9:56 AM   Result Value Ref Range    ADENOVIRUS, PCR Not Detected Not Detected    Coronavirus 229E Not Detected Not Detected    Coronavirus HKU1 Not Detected Not Detected    Coronavirus NL63 Not Detected Not Detected    Coronavirus OC43 Not Detected Not Detected    Human Metapneumovirus Not Detected Not Detected    Human Rhinovirus/Enterovirus Not Detected Not Detected    Influenza B PCR Not Detected Not Detected    Parainfluenza Virus 1 Not Detected Not Detected    Parainfluenza Virus 2 Not Detected Not Detected    Parainfluenza Virus 3 Not Detected Not Detected    Parainfluenza Virus 4 Not Detected Not Detected    Bordetella pertussis pcr Not Detected Not Detected    Influenza A H1 2009 PCR Not Detected Not Detected    Chlamydophila pneumoniae PCR Not Detected Not Detected    Mycoplasma pneumo by PCR Not Detected Not Detected    Influenza A PCR Not Detected Not Detected    Influenza A H3 Not Detected Not Detected    Influenza A H1 Not Detected Not Detected    RSV, PCR Not Detected Not Detected   CBC Auto Differential    Collection  Time: 11/05/19  9:56 AM   Result Value Ref Range    WBC 10.66 3.40 - 10.80 10*3/mm3    RBC 4.27 3.77 - 5.28 10*6/mm3    Hemoglobin 11.9 (L) 12.0 - 15.9 g/dL    Hematocrit 37.4 34.0 - 46.6 %    MCV 87.6 79.0 - 97.0 fL    MCH 27.9 26.6 - 33.0 pg    MCHC 31.8 31.5 - 35.7 g/dL    RDW 16.5 (H) 12.3 - 15.4 %    RDW-SD 53.3 37.0 - 54.0 fl    MPV 11.4 6.0 - 12.0 fL    Platelets 292 140 - 450 10*3/mm3    Neutrophil % 79.0 (H) 42.7 - 76.0 %    Lymphocyte % 12.9 (L) 19.6 - 45.3 %    Monocyte % 5.5 5.0 - 12.0 %    Eosinophil % 1.3 0.3 - 6.2 %    Basophil % 0.6 0.0 - 1.5 %    Immature Grans % 0.7 (H) 0.0 - 0.5 %    Neutrophils, Absolute 8.42 (H) 1.70 - 7.00 10*3/mm3    Lymphocytes, Absolute 1.38 0.70 - 3.10 10*3/mm3    Monocytes, Absolute 0.59 0.10 - 0.90 10*3/mm3    Eosinophils, Absolute 0.14 0.00 - 0.40 10*3/mm3    Basophils, Absolute 0.06 0.00 - 0.20 10*3/mm3    Immature Grans, Absolute 0.07 (H) 0.00 - 0.05 10*3/mm3    nRBC 0.0 0.0 - 0.2 /100 WBC   Influenza Antigen, Rapid - Swab, Nasopharynx    Collection Time: 11/06/19  6:53 PM   Result Value Ref Range    Influenza A PCR Not Detected Not Detected    Influenza B PCR Not Detected Not Detected   Influenza Antigen, Rapid - Swab, Nasopharynx    Collection Time: 11/12/19  2:02 PM   Result Value Ref Range    Influenza A PCR Not Detected Not Detected    Influenza B PCR Not Detected Not Detected   Comprehensive Metabolic Panel    Collection Time: 11/12/19  2:15 PM   Result Value Ref Range    Glucose 77 65 - 99 mg/dL    BUN 6 6 - 20 mg/dL    Creatinine 0.80 0.57 - 1.00 mg/dL    Sodium 143 136 - 145 mmol/L    Potassium 3.5 3.5 - 5.2 mmol/L    Chloride 106 98 - 107 mmol/L    CO2 24.0 22.0 - 29.0 mmol/L    Calcium 9.4 8.6 - 10.5 mg/dL    Total Protein 7.8 6.0 - 8.5 g/dL    Albumin 4.50 3.50 - 5.20 g/dL    ALT (SGPT) 18 1 - 33 U/L    AST (SGOT) 21 1 - 32 U/L    Alkaline Phosphatase 90 39 - 117 U/L    Total Bilirubin 0.6 0.2 - 1.2 mg/dL    eGFR Non African Amer 81 >60 mL/min/1.73     Globulin 3.3 gm/dL    A/G Ratio 1.4 g/dL    BUN/Creatinine Ratio 7.5 7.0 - 25.0    Anion Gap 13.0 5.0 - 15.0 mmol/L   Troponin    Collection Time: 11/12/19  2:15 PM   Result Value Ref Range    Troponin T <0.010 0.000 - 0.030 ng/mL   D-dimer, Quantitative    Collection Time: 11/12/19  2:15 PM   Result Value Ref Range    D-Dimer, Quantitative 0.30 0.17 - 0.59 MCGFEU/mL   Blood Culture - Blood, Blood, Venous Line    Collection Time: 11/12/19  2:15 PM   Result Value Ref Range    Blood Culture No growth at 5 days    Magnesium    Collection Time: 11/12/19  2:15 PM   Result Value Ref Range    Magnesium 2.5 1.6 - 2.6 mg/dL   TSH    Collection Time: 11/12/19  2:15 PM   Result Value Ref Range    TSH 0.764 0.270 - 4.200 uIU/mL   CBC Auto Differential    Collection Time: 11/12/19  2:15 PM   Result Value Ref Range    WBC 8.40 3.40 - 10.80 10*3/mm3    RBC 4.29 3.77 - 5.28 10*6/mm3    Hemoglobin 12.2 12.0 - 15.9 g/dL    Hematocrit 36.0 34.0 - 46.6 %    MCV 84.0 79.0 - 97.0 fL    MCH 28.4 26.6 - 33.0 pg    MCHC 33.8 31.5 - 35.7 g/dL    RDW 18.2 (H) 12.3 - 15.4 %    RDW-SD 53.4 37.0 - 54.0 fl    MPV 9.2 6.0 - 12.0 fL    Platelets 235 140 - 450 10*3/mm3    Neutrophil % 74.9 42.7 - 76.0 %    Lymphocyte % 16.5 (L) 19.6 - 45.3 %    Monocyte % 6.8 5.0 - 12.0 %    Eosinophil % 1.2 0.3 - 6.2 %    Basophil % 0.6 0.0 - 1.5 %    Neutrophils, Absolute 6.30 1.70 - 7.00 10*3/mm3    Lymphocytes, Absolute 1.40 0.70 - 3.10 10*3/mm3    Monocytes, Absolute 0.60 0.10 - 0.90 10*3/mm3    Eosinophils, Absolute 0.10 0.00 - 0.40 10*3/mm3    Basophils, Absolute 0.10 0.00 - 0.20 10*3/mm3    nRBC 0.1 0.0 - 0.2 /100 WBC   Blood Culture - Blood, Arm, Right    Collection Time: 11/12/19  3:07 PM   Result Value Ref Range    Blood Culture No growth at 5 days    Pregnancy, Urine - Urine, Clean Catch    Collection Time: 11/12/19  3:58 PM   Result Value Ref Range    HCG, Urine QL Negative Negative   Urinalysis With Culture If Indicated - Urine, Clean Catch     "Collection Time: 11/12/19  3:58 PM   Result Value Ref Range    Color, UA Yellow Yellow, Straw    Appearance, UA Cloudy (A) Clear    pH, UA 7.0 5.0 - 8.0    Specific Gravity, UA <=1.005 1.005 - 1.030    Glucose, UA Negative Negative    Ketones, UA Negative Negative    Bilirubin, UA Negative Negative    Blood, UA Negative Negative    Protein, UA Negative Negative    Leuk Esterase, UA Negative Negative    Nitrite, UA Negative Negative    Urobilinogen, UA 0.2 E.U./dL 0.2 - 1.0 E.U./dL         Review of Systems   HENT: Positive for sore throat. Negative for hearing loss and sinus pressure.    Neurological: Negative for light-headedness, headache and memory problem.       Objective     /74 (BP Location: Left arm, Patient Position: Sitting, Cuff Size: Adult)   Pulse 95   Temp 98.1 °F (36.7 °C) (Oral)   Resp 18   Ht 149.9 cm (59\")   Wt 80.4 kg (177 lb 4.8 oz)   SpO2 100%   BMI 35.81 kg/m²     Physical Exam   Constitutional: She is oriented to person, place, and time. She appears well-developed and well-nourished.   HENT:   Head: Normocephalic and atraumatic.   Right Ear: External ear normal.   Left Ear: External ear normal.   Nose: Nose normal.   Mouth/Throat: Oropharynx is clear and moist. No oropharyngeal exudate.   Eyes: Pupils are equal, round, and reactive to light. Conjunctivae are normal.   Neck: Normal range of motion.   Cardiovascular: Normal rate, regular rhythm, normal heart sounds and intact distal pulses.   Pulmonary/Chest: Effort normal and breath sounds normal.   Abdominal: Soft. Bowel sounds are normal.   Musculoskeletal: Normal range of motion.   Neurological: She is alert and oriented to person, place, and time.   Skin: Skin is warm and dry.   Psychiatric: She has a normal mood and affect. Her behavior is normal. Judgment and thought content normal.   Nursing note and vitals reviewed.        Assessment/Plan   Krupa was seen today for sore throat.    Diagnoses and all orders for this " visit:    Sore throat      Patient Instructions   Strep is negative.   Gargle spray throat. Treat symptoms.  F/u if worsens.       Neli May, APRN    02/21/20

## 2020-02-26 RX ORDER — LEVOTHYROXINE SODIUM 0.05 MG/1
TABLET ORAL
Qty: 30 TABLET | Refills: 0 | Status: SHIPPED | OUTPATIENT
Start: 2020-02-26 | End: 2020-03-24 | Stop reason: SDUPTHER

## 2020-03-16 PROCEDURE — 87635 SARS-COV-2 COVID-19 AMP PRB: CPT | Performed by: NURSE PRACTITIONER

## 2020-03-18 ENCOUNTER — TELEPHONE (OUTPATIENT)
Dept: FAMILY MEDICINE CLINIC | Facility: CLINIC | Age: 35
End: 2020-03-18

## 2020-03-18 NOTE — TELEPHONE ENCOUNTER
I spoke with the patient.  It is reasonable to try the Bronchodilators to help with her cough.  She is otherwise feeling okay.

## 2020-03-18 NOTE — TELEPHONE ENCOUNTER
Patient went on 03-16-20 to  and was tested for COVID-19, still waiting on results. Is at home selft quarantined,but she is not feeling any better.  States her fever is going up and down, has blood in her nose, still a dry cough.  Diarrhea has stopped.  She is wanting to know if she needs to use her inhaler and or nebulizer to try to help with the dry cough

## 2020-03-20 ENCOUNTER — TELEPHONE (OUTPATIENT)
Dept: FAMILY MEDICINE CLINIC | Facility: CLINIC | Age: 35
End: 2020-03-20

## 2020-03-20 ENCOUNTER — HOSPITAL ENCOUNTER (EMERGENCY)
Facility: HOSPITAL | Age: 35
Discharge: HOME | End: 2020-03-20
Attending: EMERGENCY MEDICINE | Admitting: EMERGENCY MEDICINE

## 2020-03-20 ENCOUNTER — APPOINTMENT (OUTPATIENT)
Dept: GENERAL RADIOLOGY | Facility: HOSPITAL | Age: 35
End: 2020-03-20

## 2020-03-20 VITALS
BODY MASS INDEX: 34.8 KG/M2 | SYSTOLIC BLOOD PRESSURE: 115 MMHG | DIASTOLIC BLOOD PRESSURE: 69 MMHG | TEMPERATURE: 97.6 F | WEIGHT: 172.62 LBS | HEIGHT: 59 IN | RESPIRATION RATE: 16 BRPM | HEART RATE: 89 BPM | OXYGEN SATURATION: 100 %

## 2020-03-20 DIAGNOSIS — J40 BRONCHITIS: Primary | ICD-10-CM

## 2020-03-20 DIAGNOSIS — R50.9 FEVER, UNSPECIFIED FEVER CAUSE: ICD-10-CM

## 2020-03-20 LAB
BASOPHILS # BLD AUTO: 0 10*3/MM3 (ref 0–0.2)
BASOPHILS NFR BLD AUTO: 0.6 % (ref 0–1.5)
DEPRECATED RDW RBC AUTO: 64.8 FL (ref 37–54)
EOSINOPHIL # BLD AUTO: 0.1 10*3/MM3 (ref 0–0.4)
EOSINOPHIL NFR BLD AUTO: 1.9 % (ref 0.3–6.2)
ERYTHROCYTE [DISTWIDTH] IN BLOOD BY AUTOMATED COUNT: 22.9 % (ref 12.3–15.4)
HCT VFR BLD AUTO: 26.5 % (ref 34–46.6)
HGB BLD-MCNC: 9.4 G/DL (ref 12–15.9)
LYMPHOCYTES # BLD AUTO: 2.1 10*3/MM3 (ref 0.7–3.1)
LYMPHOCYTES NFR BLD AUTO: 32.8 % (ref 19.6–45.3)
MCH RBC QN AUTO: 29 PG (ref 26.6–33)
MCHC RBC AUTO-ENTMCNC: 35.3 G/DL (ref 31.5–35.7)
MCV RBC AUTO: 82 FL (ref 79–97)
MONOCYTES # BLD AUTO: 0.6 10*3/MM3 (ref 0.1–0.9)
MONOCYTES NFR BLD AUTO: 9.2 % (ref 5–12)
NEUTROPHILS # BLD AUTO: 3.6 10*3/MM3 (ref 1.7–7)
NEUTROPHILS NFR BLD AUTO: 55.5 % (ref 42.7–76)
NRBC BLD AUTO-RTO: 0.3 /100 WBC (ref 0–0.2)
PLATELET # BLD AUTO: 218 10*3/MM3 (ref 140–450)
PMV BLD AUTO: 8.5 FL (ref 6–12)
RBC # BLD AUTO: 3.23 10*6/MM3 (ref 3.77–5.28)
WBC NRBC COR # BLD: 6.5 10*3/MM3 (ref 3.4–10.8)

## 2020-03-20 PROCEDURE — 71045 X-RAY EXAM CHEST 1 VIEW: CPT

## 2020-03-20 PROCEDURE — 85025 COMPLETE CBC W/AUTO DIFF WBC: CPT | Performed by: EMERGENCY MEDICINE

## 2020-03-20 PROCEDURE — 99283 EMERGENCY DEPT VISIT LOW MDM: CPT

## 2020-03-20 NOTE — TELEPHONE ENCOUNTER
Patient states her cough is getting worse and her fever spiked to 104.5 last night.  States she has not seen anyone for 5 days.  She was drinking last night as well, which she states she knows she shouldn't have.  She is still waiting on results of her COVID-19 test.

## 2020-03-23 ENCOUNTER — OFFICE VISIT (OUTPATIENT)
Dept: FAMILY MEDICINE CLINIC | Facility: CLINIC | Age: 35
End: 2020-03-23

## 2020-03-23 VITALS
TEMPERATURE: 99.3 F | RESPIRATION RATE: 20 BRPM | DIASTOLIC BLOOD PRESSURE: 85 MMHG | SYSTOLIC BLOOD PRESSURE: 121 MMHG | HEIGHT: 59 IN | WEIGHT: 176.6 LBS | HEART RATE: 100 BPM | BODY MASS INDEX: 35.6 KG/M2 | OXYGEN SATURATION: 98 %

## 2020-03-23 DIAGNOSIS — J40 BRONCHITIS: Primary | ICD-10-CM

## 2020-03-23 PROCEDURE — 99213 OFFICE O/P EST LOW 20 MIN: CPT | Performed by: NURSE PRACTITIONER

## 2020-03-23 NOTE — PATIENT INSTRUCTIONS
Work note given to patient today.   She is to follow up and if symptoms worsen  Stop muccinex DM has increased your pulse.

## 2020-03-24 RX ORDER — LEVOTHYROXINE SODIUM 0.05 MG/1
50 TABLET ORAL DAILY
Qty: 30 TABLET | Refills: 2 | Status: SHIPPED | OUTPATIENT
Start: 2020-03-24 | End: 2020-07-13 | Stop reason: SDUPTHER

## 2020-04-27 ENCOUNTER — OFFICE VISIT (OUTPATIENT)
Dept: ORTHOPEDIC SURGERY | Facility: CLINIC | Age: 35
End: 2020-04-27

## 2020-04-27 VITALS
DIASTOLIC BLOOD PRESSURE: 75 MMHG | SYSTOLIC BLOOD PRESSURE: 118 MMHG | HEIGHT: 60 IN | HEART RATE: 106 BPM | WEIGHT: 181.4 LBS | BODY MASS INDEX: 35.61 KG/M2

## 2020-04-27 DIAGNOSIS — S46.811A SUPRASPINATUS SPRAIN, RIGHT, INITIAL ENCOUNTER: Primary | ICD-10-CM

## 2020-04-27 PROCEDURE — 99214 OFFICE O/P EST MOD 30 MIN: CPT | Performed by: FAMILY MEDICINE

## 2020-04-27 RX ORDER — MELOXICAM 15 MG/1
15 TABLET ORAL DAILY PRN
Qty: 30 TABLET | Refills: 4 | Status: SHIPPED | OUTPATIENT
Start: 2020-04-27 | End: 2020-09-28

## 2020-04-27 RX ORDER — SACCHAROMYCES BOULARDII 250 MG
250 CAPSULE ORAL 2 TIMES DAILY
COMMUNITY
End: 2020-09-09 | Stop reason: ALTCHOICE

## 2020-04-27 NOTE — PROGRESS NOTES
"Primary Care Sports Medicine Office Visit Note     Patient ID: Krupa Concepcion is a 35 y.o. female.    Chief Complaint:  Chief Complaint   Patient presents with   • Right Arm - Initial Evaluation, Pain     X 5 yrs     HPI:    Ms. Krupa Concepcion is a 35 y.o. female who presents to the clinic today for shoulder pain and weakness in her R arm. She states that 4-5 years ago she had a fracture in her R forearm. 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 As she was falling, a coworker grabbed her R forearm. Since then she states she is experiencing tingling and numbness in her first and fifth digit. She has pain with lifting things in front of her. She also has a feeling of clicking now, and \"dislocating\". Had to \"put in back in to place\" x3 this weekend.      Past Medical History:   Diagnosis Date   • Anxiety    • Arrhythmia    • Asthma    • Chest pain 8/14/2019   • Depression    • Disease of thyroid gland    • GERD (gastroesophageal reflux disease)    • Migraine    • Schizoaffective disorder (CMS/HCC)        Past Surgical History:   Procedure Laterality Date   • COLONOSCOPY     • DENTAL PROCEDURE     • EAR TUBES Bilateral     x2 times   • ENDOSCOPY     • TONSILLECTOMY         Family History   Problem Relation Age of Onset   • Hypertension Mother    • Migraines Mother    • Osteoarthritis Mother    • Hyperlipidemia Father    • Hypertension Father      Social History     Occupational History   • Not on file   Tobacco Use   • Smoking status: Former Smoker   • Smokeless tobacco: Never Used   Substance and Sexual Activity   • Alcohol use: Yes     Alcohol/week: 2.0 standard drinks     Types: 2 Glasses of wine per week     Frequency: Never   • Drug use: No   • Sexual activity: Defer     Birth control/protection: Implant      Review of Systems   Constitutional: Negative for activity change and fever.   Respiratory: Negative for cough and " "shortness of breath.    Cardiovascular: Negative for chest pain.   Gastrointestinal: Negative for constipation, diarrhea, nausea and vomiting.   Musculoskeletal: Positive for arthralgias.   Skin: Negative for color change and rash.   Neurological: Negative for weakness.   Hematological: Does not bruise/bleed easily.       Objective:    /75 (BP Location: Left arm, Patient Position: Sitting, Cuff Size: Large Adult)   Pulse 106   Ht 151.1 cm (59.5\")   Wt 82.3 kg (181 lb 6.4 oz)   LMP 04/16/2020   BMI 36.03 kg/m²     Physical Examination:  Physical Exam   Constitutional: She appears well-developed and well-nourished. No distress.   HENT:   Head: Normocephalic and atraumatic.   Eyes: Conjunctivae are normal.   Cardiovascular: Intact distal pulses.   Pulmonary/Chest: Effort normal. No respiratory distress.   Neurological: She is alert.   Skin: Skin is warm. Capillary refill takes less than 2 seconds. She is not diaphoretic.   Nursing note and vitals reviewed.    Right Shoulder Exam     Range of Motion   Active abduction: normal   Passive abduction: normal   Extension: normal   External rotation: normal   Forward flexion: normal   Internal rotation 0 degrees: normal     Muscle Strength   Abduction: 5/5   External rotation: 4/5   Supraspinatus: 4/5   Subscapularis: 5/5   Biceps: 5/5     Tests   Hernandez test: positive  Impingement: negative  Drop arm: negative  Sulcus: absent    Other   Erythema: absent  Sensation: normal  Pulse: present    Comments:  Mildly positive Ba for pain, positive resisted external rotation for pain as well, negative liftoff.  Negative Custer's, negative scarf.  Positive Neer.  Negative speeds/Yergason's.  Labral testing reveals no obvious crepitus, clicking, or other defect.  No gross instability.      Cervical range of motion is full with no tenderness to palpation to the bony midline or paraspinal cervical musculature.  Spurling's maneuver to the right is negative.            Imaging " "and other tests:  No new imaging today.  XR forearm right on date 4/23/2020 yields no obvious fracture, malalignment, or dislocation.  Negative XR right forearm.    Assessment and Plan:    1. Supraspinatus sprain, right, initial encounter  - meloxicam (MOBIC) 15 MG tablet; Take 1 tablet by mouth Daily As Needed for Mild Pain .  Dispense: 30 tablet; Refill: 4  - Ambulatory Referral to Physical Therapy Evaluate and treat; Stretching (Supraspinatus acute partial tear), ROM, Strengthening    I discussed with the patient during her syncopal event, if she was to have contracted against a pulled arm, this is a very common mechanism for rotator cuff injury.  I would like for her to start with simple anti-inflammatory and physical therapy program for stretching and strengthening of the supraspinatus musculature specifically, but with the entirety of the rotator cuff as well.  I would like for her to return in 1 month.  If no improvement, continued clicking, or other labral signs, we can always get an MRI contrasted study at that time.    John MORALES \"Chance\" Margarito VERDE, , CAQSM  04/27/20  08:49    Disclaimer: Please note that areas of this note were completed with computer voice recognition software.  Quite often unanticipated grammatical, syntax, homophones, and other interpretive errors are inadvertently transcribed by the computer software. Please excuse any errors that have escaped final proofreading.  "

## 2020-05-03 ENCOUNTER — HOSPITAL ENCOUNTER (EMERGENCY)
Facility: HOSPITAL | Age: 35
Discharge: HOME | End: 2020-05-03
Admitting: EMERGENCY MEDICINE

## 2020-05-03 ENCOUNTER — APPOINTMENT (OUTPATIENT)
Dept: GENERAL RADIOLOGY | Facility: HOSPITAL | Age: 35
End: 2020-05-03

## 2020-05-03 ENCOUNTER — APPOINTMENT (OUTPATIENT)
Dept: CT IMAGING | Facility: HOSPITAL | Age: 35
End: 2020-05-03

## 2020-05-03 VITALS
RESPIRATION RATE: 13 BRPM | BODY MASS INDEX: 32.39 KG/M2 | SYSTOLIC BLOOD PRESSURE: 103 MMHG | DIASTOLIC BLOOD PRESSURE: 63 MMHG | WEIGHT: 165 LBS | OXYGEN SATURATION: 100 % | TEMPERATURE: 98 F | HEIGHT: 60 IN | HEART RATE: 81 BPM

## 2020-05-03 DIAGNOSIS — R55 SYNCOPE, UNSPECIFIED SYNCOPE TYPE: Primary | ICD-10-CM

## 2020-05-03 LAB
AMPHET+METHAMPHET UR QL: NEGATIVE
ANION GAP SERPL CALCULATED.3IONS-SCNC: 12 MMOL/L (ref 5–15)
B-HCG UR QL: NEGATIVE
BARBITURATES UR QL SCN: NEGATIVE
BASOPHILS # BLD AUTO: 0 10*3/MM3 (ref 0–0.2)
BASOPHILS NFR BLD AUTO: 0.6 % (ref 0–1.5)
BENZODIAZ UR QL SCN: NEGATIVE
BILIRUB UR QL STRIP: NEGATIVE
BUN BLD-MCNC: 12 MG/DL (ref 6–20)
BUN/CREAT SERPL: 12.2 (ref 7–25)
CALCIUM SPEC-SCNC: 8.7 MG/DL (ref 8.6–10.5)
CANNABINOIDS SERPL QL: NEGATIVE
CHLORIDE SERPL-SCNC: 111 MMOL/L (ref 98–107)
CLARITY UR: CLEAR
CO2 SERPL-SCNC: 19 MMOL/L (ref 22–29)
COCAINE UR QL: NEGATIVE
COLOR UR: YELLOW
CREAT BLD-MCNC: 0.98 MG/DL (ref 0.57–1)
DEPRECATED RDW RBC AUTO: 59.5 FL (ref 37–54)
EOSINOPHIL # BLD AUTO: 0.1 10*3/MM3 (ref 0–0.4)
EOSINOPHIL NFR BLD AUTO: 1.8 % (ref 0.3–6.2)
ERYTHROCYTE [DISTWIDTH] IN BLOOD BY AUTOMATED COUNT: 19.8 % (ref 12.3–15.4)
GFR SERPL CREATININE-BSD FRML MDRD: 64 ML/MIN/1.73
GLUCOSE BLD-MCNC: 85 MG/DL (ref 65–99)
GLUCOSE BLDC GLUCOMTR-MCNC: 82 MG/DL (ref 70–105)
GLUCOSE UR STRIP-MCNC: NEGATIVE MG/DL
HCT VFR BLD AUTO: 29.4 % (ref 34–46.6)
HGB BLD-MCNC: 10.4 G/DL (ref 12–15.9)
HGB UR QL STRIP.AUTO: NEGATIVE
KETONES UR QL STRIP: NEGATIVE
LEUKOCYTE ESTERASE UR QL STRIP.AUTO: NEGATIVE
LYMPHOCYTES # BLD AUTO: 1.8 10*3/MM3 (ref 0.7–3.1)
LYMPHOCYTES NFR BLD AUTO: 40.5 % (ref 19.6–45.3)
MCH RBC QN AUTO: 29.8 PG (ref 26.6–33)
MCHC RBC AUTO-ENTMCNC: 35.2 G/DL (ref 31.5–35.7)
MCV RBC AUTO: 84.8 FL (ref 79–97)
METHADONE UR QL SCN: NEGATIVE
MONOCYTES # BLD AUTO: 0.4 10*3/MM3 (ref 0.1–0.9)
MONOCYTES NFR BLD AUTO: 7.7 % (ref 5–12)
NEUTROPHILS # BLD AUTO: 2.2 10*3/MM3 (ref 1.7–7)
NEUTROPHILS NFR BLD AUTO: 49.4 % (ref 42.7–76)
NITRITE UR QL STRIP: NEGATIVE
NRBC BLD AUTO-RTO: 0.1 /100 WBC (ref 0–0.2)
OPIATES UR QL: NEGATIVE
OXYCODONE UR QL SCN: NEGATIVE
PH UR STRIP.AUTO: 6 [PH] (ref 5–8)
PLATELET # BLD AUTO: 205 10*3/MM3 (ref 140–450)
PMV BLD AUTO: 8.2 FL (ref 6–12)
POTASSIUM BLD-SCNC: 3.9 MMOL/L (ref 3.5–5.2)
PROT UR QL STRIP: NEGATIVE
RBC # BLD AUTO: 3.47 10*6/MM3 (ref 3.77–5.28)
SODIUM BLD-SCNC: 142 MMOL/L (ref 136–145)
SP GR UR STRIP: <=1.005 (ref 1–1.03)
TROPONIN T SERPL-MCNC: <0.01 NG/ML (ref 0–0.03)
TROPONIN T SERPL-MCNC: <0.01 NG/ML (ref 0–0.03)
TSH SERPL DL<=0.05 MIU/L-ACNC: 1.1 UIU/ML (ref 0.27–4.2)
UROBILINOGEN UR QL STRIP: NORMAL
WBC NRBC COR # BLD: 4.5 10*3/MM3 (ref 3.4–10.8)
WHOLE BLOOD HOLD SPECIMEN: NORMAL

## 2020-05-03 PROCEDURE — 80307 DRUG TEST PRSMV CHEM ANLYZR: CPT | Performed by: NURSE PRACTITIONER

## 2020-05-03 PROCEDURE — 85025 COMPLETE CBC W/AUTO DIFF WBC: CPT | Performed by: NURSE PRACTITIONER

## 2020-05-03 PROCEDURE — 70450 CT HEAD/BRAIN W/O DYE: CPT

## 2020-05-03 PROCEDURE — 93005 ELECTROCARDIOGRAM TRACING: CPT

## 2020-05-03 PROCEDURE — 81025 URINE PREGNANCY TEST: CPT | Performed by: NURSE PRACTITIONER

## 2020-05-03 PROCEDURE — 71045 X-RAY EXAM CHEST 1 VIEW: CPT

## 2020-05-03 PROCEDURE — 84484 ASSAY OF TROPONIN QUANT: CPT | Performed by: NURSE PRACTITIONER

## 2020-05-03 PROCEDURE — 84443 ASSAY THYROID STIM HORMONE: CPT | Performed by: NURSE PRACTITIONER

## 2020-05-03 PROCEDURE — 99284 EMERGENCY DEPT VISIT MOD MDM: CPT

## 2020-05-03 PROCEDURE — 82962 GLUCOSE BLOOD TEST: CPT

## 2020-05-03 PROCEDURE — 81003 URINALYSIS AUTO W/O SCOPE: CPT | Performed by: NURSE PRACTITIONER

## 2020-05-03 PROCEDURE — 80048 BASIC METABOLIC PNL TOTAL CA: CPT | Performed by: NURSE PRACTITIONER

## 2020-05-03 RX ORDER — SODIUM CHLORIDE 0.9 % (FLUSH) 0.9 %
10 SYRINGE (ML) INJECTION AS NEEDED
Status: DISCONTINUED | OUTPATIENT
Start: 2020-05-03 | End: 2020-05-03 | Stop reason: HOSPADM

## 2020-05-03 NOTE — ED PROVIDER NOTES
"Subjective   Chief complaint: Syncope      Context: Patient is a 35-year-old female who comes in complaining of a syncopal episode while at work today.  She has a past history of syncopal episodes and was told by her cardiologist is due to an arrhythmia.  There was no seizure-like activity was witnessed there was no cyanosis or prolonged downtime.  She states she feels totally normal now and has no complaints.  She denies any chest pain shortness of breath nausea vomiting diarrhea cough fever.  She states she had bronchitis in March.  She denies any swelling to her legs or feet recent trauma surgery immobilization prior history of DVT or PE.  Heart score is low; states her grandmother has a \"history of passing out from an arrhythmia as well.\"  She states she had similar episodes when she was 15 years old and was due to dysfunctional thyroid.  She states she has been compliant with her thyroid medication but does not know when the last time she had her labs checked.  Any history of IVDA.  She denies any injuries from a episode.    Duration: Prior to arrival    Timing: Brief    Severity: Denies    Associated symptoms: Denies          PCP:  cole Allred            Review of Systems   Constitutional: Negative for fever.   HENT: Negative.    Eyes: Negative.    Respiratory: Negative.    Cardiovascular: Negative.    Gastrointestinal: Negative.    Endocrine: Negative.    Genitourinary: Negative.    Musculoskeletal: Negative.    Skin: Negative.    Neurological: Positive for syncope. Negative for dizziness, tremors, seizures, facial asymmetry, speech difficulty, weakness, light-headedness, numbness and headaches.   Hematological: Does not bruise/bleed easily.       Past Medical History:   Diagnosis Date   • Anxiety    • Arrhythmia    • Asthma    • Chest pain 8/14/2019   • Depression    • Disease of thyroid gland    • GERD (gastroesophageal reflux disease)    • Migraine    • Schizoaffective disorder (CMS/[facility])        Allergies "   Allergen Reactions   • Latex Itching   • Parabens Anaphylaxis   • Tea Tree Oil Anaphylaxis   • Zolpidem Tartrate Mental Status Change   • Cefdinir Hallucinations     hallucinations       Past Surgical History:   Procedure Laterality Date   • COLONOSCOPY     • DENTAL PROCEDURE     • EAR TUBES Bilateral     x2 times   • ENDOSCOPY     • TONSILLECTOMY         Family History   Problem Relation Age of Onset   • Hypertension Mother    • Migraines Mother    • Osteoarthritis Mother    • Hyperlipidemia Father    • Hypertension Father        Social History     Socioeconomic History   • Marital status:      Spouse name: Not on file   • Number of children: Not on file   • Years of education: Not on file   • Highest education level: Not on file   Tobacco Use   • Smoking status: Former Smoker   • Smokeless tobacco: Never Used   Substance and Sexual Activity   • Alcohol use: Yes     Alcohol/week: 2.0 standard drinks     Types: 2 Glasses of wine per week     Frequency: Never   • Drug use: No   • Sexual activity: Defer     Birth control/protection: Implant           Objective   Physical Exam     Vital signs in triage nurse note reviewed.   Constitutional: Awake, alert, well developed and well nourished. No acute distress is noted.   HEENT: Normocephalic, atraumatic; pupils are PERRL with intact EOM; oropharynx is pink and moist without exudate or erythema.   Neck: Supple, full range of motion without pain; no cervical lymphadenopathy.   Cardiovascular: Regular rate and rhythm, normal S1-S2.     Pulmonary: Respiratory effort regular, nonlabored; breath sounds clear to auscultation all fields.   Abdomen: Soft, nontender, nondistended with normoactive bowel sounds; no rebound or guarding.   Musculoskeletal: Independent range of motion of all extremities, no palpable tenderness or edema. Spine is midline without obvious curvature scoliosis. No bony tenderness, soft tissue swelling, deformity is noted. Paraspinal musculature is  soft, nontender.   Neuro: Alert oriented x3, speech is clear and appropriate. Cranial nerves 2-12 grossly intact, no pronator drift, normal coordination.       ECG 12 Lead    Date/Time: 5/3/2020 12:51 PM  Performed by: Chiqui Davis APRN  Authorized by: Chiqui Davis APRN   Interpreted by physician (marguerite)  Comparison: compared with previous ECG from 11/12/2019  Comparison to previous ECG: Sinus tachycardia rate of 105  Rhythm: sinus rhythm  BPM: 82  Clinical impression: normal ECG                 ED Course      Labs Reviewed   BASIC METABOLIC PANEL - Abnormal; Notable for the following components:       Result Value    Chloride 111 (*)     CO2 19.0 (*)     All other components within normal limits    Narrative:     GFR Normal >60  Chronic Kidney Disease <60  Kidney Failure <15     CBC WITH AUTO DIFFERENTIAL - Abnormal; Notable for the following components:    RBC 3.47 (*)     Hemoglobin 10.4 (*)     Hematocrit 29.4 (*)     RDW 19.8 (*)     RDW-SD 59.5 (*)     All other components within normal limits   TROPONIN (IN-HOUSE) - Normal    Narrative:     Troponin T Reference Range:  <= 0.03 ng/mL-   Negative for AMI  >0.03 ng/mL-     Abnormal for myocardial necrosis.  Clinicians would have to utilize clinical acumen, EKG, Troponin and serial changes to determine if it is an Acute Myocardial Infarction or myocardial injury due to an underlying chronic condition.       Results may be falsely decreased if patient taking Biotin.     TROPONIN (IN-HOUSE) - Normal    Narrative:     Troponin T Reference Range:  <= 0.03 ng/mL-   Negative for AMI  >0.03 ng/mL-     Abnormal for myocardial necrosis.  Clinicians would have to utilize clinical acumen, EKG, Troponin and serial changes to determine if it is an Acute Myocardial Infarction or myocardial injury due to an underlying chronic condition.       Results may be falsely decreased if patient taking Biotin.     PREGNANCY, URINE - Normal   URINALYSIS W/ CULTURE IF  INDICATED - Normal    Narrative:     Urine microscopic not indicated.   URINE DRUG SCREEN - Normal    Narrative:     Negative Thresholds For Drugs Screened:     Amphetamines               500 ng/ml   Barbiturates               200 ng/ml   Benzodiazepines            100 ng/ml   Cocaine                    300 ng/ml   Methadone                  300 ng/ml   Opiates                    300 ng/ml   Oxycodone                  100 ng/ml   THC                        50 ng/ml    The Normal Value for all drugs tested is negative. This report includes final unconfirmed screening results to be used for medical treatment purposes only. Unconfirmed results must not be used for non-medical purposes such as employment or legal testing. Clinical consideration should be applied to any drug of abuse test, particulary when unconfirmed results are used.  All urine drugs of abuse requests without chain of custody are for medical screening purposes only.  False positives are possible.     TSH - Normal   POCT GLUCOSE FINGERSTICK - Normal   CBC AND DIFFERENTIAL    Narrative:     The following orders were created for panel order CBC & Differential.  Procedure                               Abnormality         Status                     ---------                               -----------         ------                     CBC Auto Differential[[id number]]        Abnormal            Final result                 Please view results for these tests on the individual orders.   EXTRA TUBES    Narrative:     The following orders were created for panel order Extra Tubes.  Procedure                               Abnormality         Status                     ---------                               -----------         ------                     Light Blue Top[[id number]]                                   Final result                 Please view results for these tests on the individual orders.   LIGHT BLUE TOP     Medications   sodium chloride 0.9 % flush  "10 mL (has no administration in time range)     Ct Head Without Contrast    Result Date: 5/3/2020  No acute intracranial process identified.  Electronically Signed By-DR. Ramez Prescott MD On:5/3/2020 12:33 PM This report was finalized on [id number] by DR. Ramez Prescott MD.    Xr Chest 1 View    Result Date: 5/3/2020  No acute cardiopulmonary process identified.  Electronically Signed By-DR. Ramez Prescott MD On:5/3/2020 12:19 PM This report was finalized on [id number] by DR. Ramez Prescott MD.                                         MDM  Number of Diagnoses or Management Options  Syncope, unspecified syncope type:   Diagnosis management comments: Chart review: November 2018 TTE normal  December 2018: Dr. Carson cardiology note: Arrhythmia without high-grade arrhythmia  November 2019 for syncope and viral illness of upper respiratory complaints with a fever; CT head negative  March 16, 2020: Urgent care for diarrhea URI and cough; negative flu  3/20/2020: ER eval for bronchitis fever-COVID swab negative  4/23/2020 urgent care for right arm pain with negative x-rays  August admission 2019 for syncope, negative stress test echocardiogram and Holter monitor-sinus tachycardia rare PVCs normal sinus rhythm- \" relatively benign monitor study\"; heart rate 126      Chart review:      Comorbidities:  has a past medical history of Anxiety, Arrhythmia, Asthma, Chest pain (8/14/2019), Depression, Disease of thyroid gland, GERD (gastroesophageal reflux disease), Migraine, and Schizoaffective disorder (CMS/HCC).  Differentials: Vasovagal electrolyte abnormalities dehydration arrhythmia not all inclusive of differentials considered  Radiology interpretation:  X-rays reviewed by me and interpreted by radiologist,   Ct Head Without Contrast    Result Date: 5/3/2020  No acute intracranial process identified.  Electronically Signed By-DR. Ramez Prescott MD On:5/3/2020 12:33 PM This report was finalized on " [id number] by DR. Ramez Prescott MD.    Xr Chest 1 View    Result Date: 5/3/2020  No acute cardiopulmonary process identified.  Electronically Signed By-DR. Ramez Prescott MD On:5/3/2020 12:19 PM This report was finalized on [id number] by DR. Ramez Prescott MD.    Lab interpretation:  Labs viewed by me significant for, negative.  Heart score low.  PERC negative    Appropriate PPE worn during exam.  On exam patient tj awake alert oriented neurologically intact without any episodes during ER visit.  Negative Romberg    i discussed findings with patient who voices understanding of discharge instructions, signs and symptoms requiring return to ED; discharged improved and in stable condition with follow up for re-evaluation.         Amount and/or Complexity of Data Reviewed  Clinical lab tests: reviewed  Tests in the radiology section of CPT®: reviewed  Tests in the medicine section of CPT®: reviewed    Patient Progress  Patient progress: stable      Final diagnoses:   Syncope, unspecified syncope type            Chiqui Davis, APRN  05/03/20 1422

## 2020-05-13 ENCOUNTER — APPOINTMENT (OUTPATIENT)
Dept: GENERAL RADIOLOGY | Facility: HOSPITAL | Age: 35
End: 2020-05-13

## 2020-05-13 ENCOUNTER — HOSPITAL ENCOUNTER (EMERGENCY)
Facility: HOSPITAL | Age: 35
Discharge: HOME | End: 2020-05-13
Admitting: EMERGENCY MEDICINE

## 2020-05-13 ENCOUNTER — TELEPHONE (OUTPATIENT)
Dept: CARDIOLOGY | Facility: CLINIC | Age: 35
End: 2020-05-13

## 2020-05-13 VITALS
BODY MASS INDEX: 36 KG/M2 | TEMPERATURE: 97.9 F | DIASTOLIC BLOOD PRESSURE: 68 MMHG | OXYGEN SATURATION: 100 % | HEART RATE: 69 BPM | WEIGHT: 178.57 LBS | HEIGHT: 59 IN | SYSTOLIC BLOOD PRESSURE: 96 MMHG | RESPIRATION RATE: 19 BRPM

## 2020-05-13 DIAGNOSIS — R06.02 SHORTNESS OF BREATH: ICD-10-CM

## 2020-05-13 DIAGNOSIS — R07.9 CHEST PAIN, UNSPECIFIED TYPE: Primary | ICD-10-CM

## 2020-05-13 LAB
ALBUMIN SERPL-MCNC: 4.2 G/DL (ref 3.5–5.2)
ALBUMIN/GLOB SERPL: 1.9 G/DL
ALP SERPL-CCNC: 71 U/L (ref 39–117)
ALT SERPL W P-5'-P-CCNC: 10 U/L (ref 1–33)
ANION GAP SERPL CALCULATED.3IONS-SCNC: 11 MMOL/L (ref 5–15)
AST SERPL-CCNC: 16 U/L (ref 1–32)
BASOPHILS # BLD AUTO: 0 10*3/MM3 (ref 0–0.2)
BASOPHILS NFR BLD AUTO: 0.7 % (ref 0–1.5)
BILIRUB SERPL-MCNC: 1 MG/DL (ref 0.2–1.2)
BILIRUB UR QL STRIP: NEGATIVE
BUN BLD-MCNC: 13 MG/DL (ref 6–20)
BUN/CREAT SERPL: 19.1 (ref 7–25)
CALCIUM SPEC-SCNC: 8.7 MG/DL (ref 8.6–10.5)
CHLORIDE SERPL-SCNC: 111 MMOL/L (ref 98–107)
CLARITY UR: CLEAR
CO2 SERPL-SCNC: 18 MMOL/L (ref 22–29)
COLOR UR: YELLOW
CREAT BLD-MCNC: 0.68 MG/DL (ref 0.57–1)
D DIMER PPP FEU-MCNC: 0.17 MCGFEU/ML (ref 0.17–0.59)
DEPRECATED RDW RBC AUTO: 56.9 FL (ref 37–54)
EOSINOPHIL # BLD AUTO: 0.1 10*3/MM3 (ref 0–0.4)
EOSINOPHIL NFR BLD AUTO: 1.8 % (ref 0.3–6.2)
ERYTHROCYTE [DISTWIDTH] IN BLOOD BY AUTOMATED COUNT: 19.3 % (ref 12.3–15.4)
GFR SERPL CREATININE-BSD FRML MDRD: 97 ML/MIN/1.73
GLOBULIN UR ELPH-MCNC: 2.2 GM/DL
GLUCOSE BLD-MCNC: 93 MG/DL (ref 65–99)
GLUCOSE UR STRIP-MCNC: NEGATIVE MG/DL
HCT VFR BLD AUTO: 27.4 % (ref 34–46.6)
HGB BLD-MCNC: 9.6 G/DL (ref 12–15.9)
HGB UR QL STRIP.AUTO: NEGATIVE
HOLD SPECIMEN: NORMAL
KETONES UR QL STRIP: NEGATIVE
LEUKOCYTE ESTERASE UR QL STRIP.AUTO: NEGATIVE
LIPASE SERPL-CCNC: 42 U/L (ref 13–60)
LYMPHOCYTES # BLD AUTO: 1.4 10*3/MM3 (ref 0.7–3.1)
LYMPHOCYTES NFR BLD AUTO: 35.4 % (ref 19.6–45.3)
MCH RBC QN AUTO: 29.1 PG (ref 26.6–33)
MCHC RBC AUTO-ENTMCNC: 34.9 G/DL (ref 31.5–35.7)
MCV RBC AUTO: 83.4 FL (ref 79–97)
MONOCYTES # BLD AUTO: 0.3 10*3/MM3 (ref 0.1–0.9)
MONOCYTES NFR BLD AUTO: 7.7 % (ref 5–12)
NEUTROPHILS # BLD AUTO: 2.2 10*3/MM3 (ref 1.7–7)
NEUTROPHILS NFR BLD AUTO: 54.4 % (ref 42.7–76)
NITRITE UR QL STRIP: NEGATIVE
NRBC BLD AUTO-RTO: 0.1 /100 WBC (ref 0–0.2)
NT-PROBNP SERPL-MCNC: 94.2 PG/ML (ref 5–450)
PH UR STRIP.AUTO: 7 [PH] (ref 5–8)
PLATELET # BLD AUTO: 183 10*3/MM3 (ref 140–450)
PMV BLD AUTO: 8 FL (ref 6–12)
POTASSIUM BLD-SCNC: 3.9 MMOL/L (ref 3.5–5.2)
PROT SERPL-MCNC: 6.4 G/DL (ref 6–8.5)
PROT UR QL STRIP: NEGATIVE
RBC # BLD AUTO: 3.28 10*6/MM3 (ref 3.77–5.28)
SODIUM BLD-SCNC: 140 MMOL/L (ref 136–145)
SP GR UR STRIP: 1.01 (ref 1–1.03)
TROPONIN T SERPL-MCNC: <0.01 NG/ML (ref 0–0.03)
UROBILINOGEN UR QL STRIP: NORMAL
WBC NRBC COR # BLD: 4 10*3/MM3 (ref 3.4–10.8)

## 2020-05-13 PROCEDURE — 83690 ASSAY OF LIPASE: CPT | Performed by: PHYSICIAN ASSISTANT

## 2020-05-13 PROCEDURE — 71045 X-RAY EXAM CHEST 1 VIEW: CPT

## 2020-05-13 PROCEDURE — 93005 ELECTROCARDIOGRAM TRACING: CPT

## 2020-05-13 PROCEDURE — 83880 ASSAY OF NATRIURETIC PEPTIDE: CPT | Performed by: PHYSICIAN ASSISTANT

## 2020-05-13 PROCEDURE — 85025 COMPLETE CBC W/AUTO DIFF WBC: CPT | Performed by: PHYSICIAN ASSISTANT

## 2020-05-13 PROCEDURE — 99283 EMERGENCY DEPT VISIT LOW MDM: CPT

## 2020-05-13 PROCEDURE — 81003 URINALYSIS AUTO W/O SCOPE: CPT | Performed by: PHYSICIAN ASSISTANT

## 2020-05-13 PROCEDURE — 96360 HYDRATION IV INFUSION INIT: CPT

## 2020-05-13 PROCEDURE — 84484 ASSAY OF TROPONIN QUANT: CPT | Performed by: PHYSICIAN ASSISTANT

## 2020-05-13 PROCEDURE — 80053 COMPREHEN METABOLIC PANEL: CPT | Performed by: PHYSICIAN ASSISTANT

## 2020-05-13 PROCEDURE — 85379 FIBRIN DEGRADATION QUANT: CPT | Performed by: PHYSICIAN ASSISTANT

## 2020-05-13 RX ORDER — SODIUM CHLORIDE 0.9 % (FLUSH) 0.9 %
10 SYRINGE (ML) INJECTION AS NEEDED
Status: DISCONTINUED | OUTPATIENT
Start: 2020-05-13 | End: 2020-05-13 | Stop reason: HOSPADM

## 2020-05-13 RX ADMIN — SODIUM CHLORIDE 500 ML: 900 INJECTION, SOLUTION INTRAVENOUS at 10:31

## 2020-05-13 NOTE — DISCHARGE INSTRUCTIONS
Return to the ER for any worsening symptoms.  Follow-up with Dr. Langley tomorrow for your scheduled appointment.

## 2020-05-13 NOTE — ED PROVIDER NOTES
Subjective   History: Patient is a 35-year-old female presents to the ER with complaints of chest pain and left arm radiation and some dyspnea.  She also reports she is had several syncopal episodes.  She reports her syncope started whenever she was started to have to wear and 95 mask at work.  She reports that the chest pain and left arm pain started over the last 24 to 48 hours.  She has a new appointment with Dr. Langley tomorrow for irregular rhythm.  She denies any other significant cardiac history.      Onset: 2 3 days  Location: Chest  Duration: Constant  Character: Dull ache  Aggravating/Alleviating factors: Better with rest worse with exertion  Radiation left arm  Severity: Moderate            Review of Systems   Constitutional: Negative for activity change, appetite change, chills, diaphoresis, fatigue and fever.   HENT: Negative.    Respiratory: Positive for chest tightness and shortness of breath.    Cardiovascular: Positive for chest pain.   Gastrointestinal: Negative for abdominal pain, nausea and vomiting.   Genitourinary: Negative.    Skin: Negative.    Neurological: Negative.    Psychiatric/Behavioral: Negative.        Past Medical History:   Diagnosis Date   • Anxiety    • Arrhythmia    • Asthma    • Chest pain 8/14/2019   • Depression    • Disease of thyroid gland    • GERD (gastroesophageal reflux disease)    • Migraine    • Schizoaffective disorder (CMS/HCC)        Allergies   Allergen Reactions   • Latex Itching   • Parabens Anaphylaxis   • Tea Tree Oil Anaphylaxis   • Zolpidem Tartrate Mental Status Change   • Benzonatate Other (See Comments)   • Trazodone Other (See Comments)   • Cefdinir Hallucinations     hallucinations       Past Surgical History:   Procedure Laterality Date   • COLONOSCOPY     • DENTAL PROCEDURE     • EAR TUBES Bilateral     x2 times   • ENDOSCOPY     • TONSILLECTOMY         Family History   Problem Relation Age of Onset   • Hypertension Mother    • Migraines Mother    •  Osteoarthritis Mother    • Hyperlipidemia Father    • Hypertension Father        Social History     Socioeconomic History   • Marital status:      Spouse name: Not on file   • Number of children: Not on file   • Years of education: Not on file   • Highest education level: Not on file   Tobacco Use   • Smoking status: Former Smoker   • Smokeless tobacco: Never Used   Substance and Sexual Activity   • Alcohol use: Yes     Alcohol/week: 2.0 standard drinks     Types: 2 Glasses of wine per week     Frequency: Never   • Drug use: No   • Sexual activity: Defer     Birth control/protection: Implant           Objective   Physical Exam   Constitutional: She is oriented to person, place, and time. She appears well-developed and well-nourished.   HENT:   Head: Normocephalic and atraumatic.   Eyes: Pupils are equal, round, and reactive to light.   Neck: Normal range of motion.   Cardiovascular: Normal rate and regular rhythm.   Pulmonary/Chest: Effort normal and breath sounds normal.   Musculoskeletal: Normal range of motion.   Neurological: She is alert and oriented to person, place, and time.   Skin: Skin is warm and dry.   Psychiatric: She has a normal mood and affect. Her behavior is normal.       Procedures           ED Course  ED Course as of May 13 1130   Wed May 13, 2020   1113 Heart score low at 2pts    [MG]   1125 EKG interpreted by ER physician reviewed by myself.  Sinus rhythm rate of 75    [MG]      ED Course User Index  [MG] Dinorah Lagos PA-C           Xr Chest 1 View    Result Date: 5/13/2020  No definite acute cardiac pulmonary abnormality or significant change.  Electronically Signed By-Lilo Adams On:5/13/2020 11:21 AM This report was finalized on [id number] by  Lilo Adams, .    Labs Reviewed   COMPREHENSIVE METABOLIC PANEL - Abnormal; Notable for the following components:       Result Value    Chloride 111 (*)     CO2 18.0 (*)     All other components within normal limits    Narrative:      GFR Normal >60  Chronic Kidney Disease <60  Kidney Failure <15     CBC WITH AUTO DIFFERENTIAL - Abnormal; Notable for the following components:    RBC 3.28 (*)     Hemoglobin 9.6 (*)     Hematocrit 27.4 (*)     RDW 19.3 (*)     RDW-SD 56.9 (*)     All other components within normal limits   D-DIMER, QUANTITATIVE - Normal    Narrative:     Reference Range  --------------------------------------------------------------------     < 0.50   Negative Predictive Value  0.50-0.59   Indeterminate    >= 0.60   Probable VTE             A very low percentage of patients with DVT may yield D-Dimer results   below the cut-off of 0.50 MCGFEU/mL.  This is known to be more   prevalent in patients with distal DVT.             Results of this test should always be interpreted in conjunction with   the patient's medical history, clinical presentation and other   findings.  Clinical diagnosis should not be based on the result of   INNOVANCE D-Dimer alone.   BNP (IN-HOUSE) - Normal    Narrative:     Among patients with dyspnea, NT-proBNP is highly sensitive for the detection of acute congestive heart failure. In addition NT-proBNP of <300 pg/ml effectively rules out acute congestive heart failure with 99% negative predictive value.    Results may be falsely decreased if patient taking Biotin.     LIPASE - Normal   URINALYSIS W/ CULTURE IF INDICATED - Normal    Narrative:     Urine microscopic not indicated.   TROPONIN (IN-HOUSE) - Normal    Narrative:     Troponin T Reference Range:  <= 0.03 ng/mL-   Negative for AMI  >0.03 ng/mL-     Abnormal for myocardial necrosis.  Clinicians would have to utilize clinical acumen, EKG, Troponin and serial changes to determine if it is an Acute Myocardial Infarction or myocardial injury due to an underlying chronic condition.       Results may be falsely decreased if patient taking Biotin.     CBC AND DIFFERENTIAL    Narrative:     The following orders were created for panel order CBC &  Differential.  Procedure                               Abnormality         Status                     ---------                               -----------         ------                     CBC Auto Differential[[id number]]        Abnormal            Final result                 Please view results for these tests on the individual orders.   EXTRA TUBES    Narrative:     The following orders were created for panel order Extra Tubes.  Procedure                               Abnormality         Status                     ---------                               -----------         ------                     Gold Top - SST[[id number]]                                   Final result                 Please view results for these tests on the individual orders.   GOLD TOP - SST     Medications   sodium chloride 0.9 % flush 10 mL (has no administration in time range)   sodium chloride 0.9 % bolus 500 mL (500 mL Intravenous New Bag 5/13/20 1031)                                     MDM  Number of Diagnoses or Management Options  Chest pain, unspecified type:   Shortness of breath:   Diagnosis management comments: I examined the patient using the appropriate personal protective equipment.      DISPOSITION:   Chart Review:  Comorbidity:  has a past medical history of Anxiety, Arrhythmia, Asthma, Chest pain (8/14/2019), Depression, Disease of thyroid gland, GERD (gastroesophageal reflux disease), Migraine, and Schizoaffective disorder (CMS/[facility]).  Differentials:this list is not all inclusive and does not constitute the entirety of considered causes --> CAD, PE, pneumonia, musculoskeletal pain, anxiety  ECG: interpreted by ER physician and reviewed by myself: Sinus rhythm rate of 75  Labs: UA unremarkable, CBC stable and fairly unremarkable, CMP unremarkable troponin negative, d-dimer negative    Imaging: Was interpreted by physician and reviewed by myself:  Xr Chest 1 View    Result Date: 5/13/2020  No definite acute cardiac  pulmonary abnormality or significant change.  Electronically Signed By-Lilo Adams On:5/13/2020 11:21 AM This report was finalized on [id number] by  Lilo Adams, .      Disposition/Treatment:    Patient is 35-year-old female presents to the ER complaining of left-sided chest pain shortness of breath.  All of patient's lab work chest x-ray was negative.  EKG was negative.  Patient was discharged home in stable condition return precaution follow-up instruction provided she was told to follow-up with Dr. Langley for scheduled appointment tomorrow she does have a history of a heart arrhythmia.  Was in sinus rhythm here in the ER.  She reports that she is had several weeks of syncope.  But she reports that this is when she wears her in 95 mask at work I think it is related to her mask wearing and CO2 retention.    Heart score was low at 2 points       Amount and/or Complexity of Data Reviewed  Clinical lab tests: reviewed  Tests in the medicine section of CPT®: reviewed  Decide to obtain previous medical records or to obtain history from someone other than the patient: yes    Patient Progress  Patient progress: stable      Final diagnoses:   Chest pain, unspecified type   Shortness of breath            Dinorah Lagos PA-C  05/13/20 1130

## 2020-05-13 NOTE — ED NOTES
Pt c/o left sided chest pain radiating to left neck and left arm that started 3 days ago. Pt called Dr Langley's office this morning and was referred to ED for eval. Pt denies fever, or GI symptoms. No cough, +SOA. No change in activity or other over the last 3 days. Hx of arrythmia per pt. Scheduled to see Dr Langley tomorrow for first visit.     Lilo Crow, MANOJ  05/13/20 1037

## 2020-05-13 NOTE — TELEPHONE ENCOUNTER
"Pt called c/o cp, left arm and shoulder pain , \"getting worse\",  Syncope 3x in a month.  Pt has appt tomorrow.   No recent med changes  And no strenuous work.     I advised ER to be eval.         "

## 2020-05-14 ENCOUNTER — OFFICE VISIT (OUTPATIENT)
Dept: CARDIOLOGY | Facility: CLINIC | Age: 35
End: 2020-05-14

## 2020-05-14 VITALS
DIASTOLIC BLOOD PRESSURE: 76 MMHG | OXYGEN SATURATION: 98 % | SYSTOLIC BLOOD PRESSURE: 121 MMHG | HEIGHT: 59 IN | BODY MASS INDEX: 35.88 KG/M2 | HEART RATE: 95 BPM | WEIGHT: 178 LBS

## 2020-05-14 DIAGNOSIS — R55 SYNCOPE AND COLLAPSE: ICD-10-CM

## 2020-05-14 DIAGNOSIS — R07.89 CHEST DISCOMFORT: Primary | ICD-10-CM

## 2020-05-14 PROCEDURE — 99204 OFFICE O/P NEW MOD 45 MIN: CPT | Performed by: INTERNAL MEDICINE

## 2020-05-14 NOTE — PROGRESS NOTES
Encounter Date:05/14/2020  New patient      Patient ID: Krupa Concepcion is a 35 y.o. female.    Chief Complaint:  Chest discomfort  Palpitations  History of syncope      History of Present Illness  The patient is a pleasant 35-year-old white female is here for evaluation of above problems.  Patient went to the emergency room at [facility] yesterday and was evaluated.  EKG showed no acute changes.  Troponin level was negative.  She was found to be anemic.  Patient has been having chest discomfort off and on exertional left precordial sharp pain with radiation into the left arm associated with some palpitations and shortness of breath.  Patient also has been having episodes of syncope and palpitations.  Patient had work-up in the past that showed premature ventricular contractions.  No other associated aggravating or elevating factors.  Symptoms are moderate in nature sometimes when she wears N 95 mask at work she would have dizziness.    Denies having any headache ,abdominal pain ,nausea, vomiting , diarrhea constipation, loss of weight or loss of appetite.  Denies having any excessive bruising ,hematuria or blood in the stool.    Review of all systems negative except as indicated    Assessment and Plan       ]]]]]]]]]]]]]]]]]]]]]]]  Impression  ==============  -Chest discomfort palpitations and history of syncope    -Hypothyroidism GERD bipolar disorder history of depression vitamin D deficiency    -History of premature ventricular contractions    -Anemia with hemoglobin of 9.6    -Status post tonsillectomy    -Former smoker    -Allergic to latex Ambien benzonatate trazodone cefdinir tea tree oil parabens  =========  Plan  ========  Patient has above symptoms.  Recent EKG showed no acute changes.  Patient has anemia with hemoglobin of 9.6.  This may be playing a role with her symptoms.  Ischemic cardiac work-up  Stress Cardiolite test  Echocardiogram.  Patient may benefit from 30-day event monitor to  assess for any dysrhythmia causing syncopal episodes.  Follow-up in the office on the same day.  Further plan will depend on patient's progress.  ]]]]]]]]]]]]]]]]           Diagnosis Plan   1. Chest discomfort  Adult Transthoracic Echo Complete W/ Cont if Necessary Per Protocol    Stress Test With Myocardial Perfusion One Day   2. Syncope and collapse  Adult Transthoracic Echo Complete W/ Cont if Necessary Per Protocol    Stress Test With Myocardial Perfusion One Day   LAB RESULTS (LAST 7 DAYS)    CBC  Results from last 7 days   Lab Units 05/13/20  1030   WBC 10*3/mm3 4.00   RBC 10*6/mm3 3.28*   HEMOGLOBIN g/dL 9.6*   HEMATOCRIT % 27.4*   MCV fL 83.4   PLATELETS 10*3/mm3 183       BMP  Results from last 7 days   Lab Units 05/13/20  1030   SODIUM mmol/L 140   POTASSIUM mmol/L 3.9   CHLORIDE mmol/L 111*   CO2 mmol/L 18.0*   BUN mg/dL 13   CREATININE mg/dL 0.68   GLUCOSE mg/dL 93       CMP   Results from last 7 days   Lab Units 05/13/20  1030   SODIUM mmol/L 140   POTASSIUM mmol/L 3.9   CHLORIDE mmol/L 111*   CO2 mmol/L 18.0*   BUN mg/dL 13   CREATININE mg/dL 0.68   GLUCOSE mg/dL 93   ALBUMIN g/dL 4.20   BILIRUBIN mg/dL 1.0   ALK PHOS U/L 71   AST (SGOT) U/L 16   ALT (SGPT) U/L 10   LIPASE U/L 42         BNP        TROPONIN  Results from last 7 days   Lab Units 05/13/20  1030   TROPONIN T ng/mL <0.010       CoAg        Creatinine Clearance  Estimated Creatinine Clearance: 110.2 mL/min (by C-G formula based on SCr of 0.68 mg/dL).    ABG        Radiology  Xr Chest 1 View    Result Date: 5/13/2020  No definite acute cardiac pulmonary abnormality or significant change.  Electronically Signed By-Lilo Adams On:5/13/2020 11:21 AM This report was finalized on [id number] by  Lilo Adams, .                  The following portions of the patient's history were reviewed and updated as appropriate: allergies, current medications, past family history, past medical history, past social history, past surgical history and  problem list.    Review of Systems   Constitution: Positive for malaise/fatigue. Negative for fever.   HENT: Negative for congestion and hearing loss.    Eyes: Negative for double vision and visual disturbance.   Cardiovascular: Positive for chest pain, leg swelling, palpitations and syncope. Negative for claudication and dyspnea on exertion.   Respiratory: Negative for cough and shortness of breath.    Endocrine: Negative for cold intolerance.   Skin: Negative for color change and rash.   Musculoskeletal: Negative for arthritis and joint pain.   Gastrointestinal: Negative for abdominal pain and heartburn.   Genitourinary: Negative for hematuria.   Neurological: Negative for excessive daytime sleepiness and dizziness.   Psychiatric/Behavioral: Negative for depression. The patient is not nervous/anxious.    All other systems reviewed and are negative.        Current Outpatient Medications:   •  ARIPiprazole (ABILIFY) 30 MG tablet, Take 30 mg by mouth Every Morning., Disp: , Rfl:   •  cetirizine (zyrTEC) 10 MG tablet, Take 10 mg by mouth 2 (Two) Times a Day., Disp: , Rfl:   •  EPINEPHrine (EPIPEN) 0.3 MG/0.3ML solution auto-injector injection, 0.3 mg 1 (One) Time As Needed., Disp: , Rfl:   •  FLUoxetine (PROzac) 20 MG capsule, Take 60 mg by mouth Every Morning., Disp: , Rfl:   •  levothyroxine (SYNTHROID, LEVOTHROID) 50 MCG tablet, Take 1 tablet by mouth Daily., Disp: 30 tablet, Rfl: 2  •  meloxicam (MOBIC) 15 MG tablet, Take 1 tablet by mouth Daily As Needed for Mild Pain ., Disp: 30 tablet, Rfl: 4  •  Multiple Vitamins-Minerals (MULTIVITAMIN WITH MINERALS) tablet tablet, Take 1 tablet by mouth Daily., Disp: , Rfl:   •  omeprazole (priLOSEC) 40 MG capsule, Take 40 mg by mouth every night at bedtime., Disp: , Rfl:   •  PARAGARD INTRAUTERINE COPPER intrauterine device IUD, 1 each by Intrauterine route 1 (One) Time., Disp: , Rfl:   •  QUEtiapine (SEROquel) 200 MG tablet, Take 200 mg by mouth Every Night., Disp: , Rfl:    •  ROZEREM 8 MG tablet, Take 8 mg by mouth Every Night., Disp: , Rfl:   •  saccharomyces boulardii (FLORASTOR) 250 MG capsule, Take 250 mg by mouth 2 (Two) Times a Day., Disp: , Rfl:   •  topiramate (TOPAMAX) 200 MG tablet, Take 200 mg by mouth every night at bedtime., Disp: , Rfl:     Allergies   Allergen Reactions   • Latex Itching   • Parabens Anaphylaxis   • Tea Tree Oil Anaphylaxis   • Zolpidem Tartrate Mental Status Change   • Benzonatate Other (See Comments)   • Trazodone Other (See Comments)   • Cefdinir Hallucinations     hallucinations       Family History   Problem Relation Age of Onset   • Hypertension Mother    • Migraines Mother    • Osteoarthritis Mother    • Hyperlipidemia Father    • Hypertension Father        Past Surgical History:   Procedure Laterality Date   • COLONOSCOPY     • DENTAL PROCEDURE     • EAR TUBES Bilateral     x2 times   • ENDOSCOPY     • TONSILLECTOMY         Past Medical History:   Diagnosis Date   • Anxiety    • Arrhythmia    • Asthma    • Chest pain 8/14/2019   • Depression    • Disease of thyroid gland    • GERD (gastroesophageal reflux disease)    • Migraine    • Schizoaffective disorder (CMS/HCC)        Family History   Problem Relation Age of Onset   • Hypertension Mother    • Migraines Mother    • Osteoarthritis Mother    • Hyperlipidemia Father    • Hypertension Father        Social History     Socioeconomic History   • Marital status:      Spouse name: Not on file   • Number of children: Not on file   • Years of education: Not on file   • Highest education level: Not on file   Tobacco Use   • Smoking status: Former Smoker   • Smokeless tobacco: Never Used   Substance and Sexual Activity   • Alcohol use: Yes     Alcohol/week: 2.0 standard drinks     Types: 2 Glasses of wine per week     Frequency: Never   • Drug use: No   • Sexual activity: Defer     Birth control/protection: Implant         Procedures      Objective:       Physical Exam    /76 (BP  "Location: Left arm, Patient Position: Sitting, Cuff Size: Adult)   Pulse 95   Ht 149.9 cm (59\")   Wt 80.7 kg (178 lb)   LMP 04/16/2020   SpO2 98%   BMI 35.95 kg/m²   The patient is alert, oriented and in no distress.    Vital signs as noted above.    Head and neck revealed no carotid bruits or jugular venous distension.  No thyromegaly or lymphadenopathy is present.    Lungs clear.  No wheezing.  Breath sounds are normal bilaterally.    Heart normal first and second heart sounds.  No murmur..  No pericardial rub is present.  No gallop is present.    Abdomen soft and nontender.  No organomegaly is present.    Extremities revealed good peripheral pulses without any pedal edema.    Skin warm and dry.    Musculoskeletal system is grossly normal.    CNS grossly normal.        "

## 2020-06-02 ENCOUNTER — APPOINTMENT (OUTPATIENT)
Dept: CARDIOLOGY | Facility: HOSPITAL | Age: 35
End: 2020-06-02

## 2020-07-01 ENCOUNTER — HOSPITAL ENCOUNTER (OUTPATIENT)
Dept: CARDIOLOGY | Facility: HOSPITAL | Age: 35
Discharge: HOME | End: 2020-07-01

## 2020-07-01 ENCOUNTER — OFFICE VISIT (OUTPATIENT)
Dept: CARDIOLOGY | Facility: CLINIC | Age: 35
End: 2020-07-01

## 2020-07-01 ENCOUNTER — HOSPITAL ENCOUNTER (OUTPATIENT)
Dept: CARDIOLOGY | Facility: HOSPITAL | Age: 35
Discharge: HOME | End: 2020-07-01
Admitting: INTERNAL MEDICINE

## 2020-07-01 VITALS
DIASTOLIC BLOOD PRESSURE: 74 MMHG | BODY MASS INDEX: 33.26 KG/M2 | WEIGHT: 165 LBS | HEIGHT: 59 IN | HEART RATE: 73 BPM | SYSTOLIC BLOOD PRESSURE: 102 MMHG

## 2020-07-01 VITALS
DIASTOLIC BLOOD PRESSURE: 68 MMHG | SYSTOLIC BLOOD PRESSURE: 125 MMHG | WEIGHT: 165 LBS | BODY MASS INDEX: 33.26 KG/M2 | HEIGHT: 59 IN

## 2020-07-01 DIAGNOSIS — R55 SYNCOPE AND COLLAPSE: ICD-10-CM

## 2020-07-01 DIAGNOSIS — R07.89 CHEST DISCOMFORT: ICD-10-CM

## 2020-07-01 DIAGNOSIS — R94.39 ABNORMAL NUCLEAR STRESS TEST: Primary | ICD-10-CM

## 2020-07-01 LAB
BH CV ECHO MEAS - ACS: 1.5 CM
BH CV ECHO MEAS - AO MAX PG (FULL): 4.7 MMHG
BH CV ECHO MEAS - AO MAX PG: 9.9 MMHG
BH CV ECHO MEAS - AO MEAN PG (FULL): 3.4 MMHG
BH CV ECHO MEAS - AO MEAN PG: 6.1 MMHG
BH CV ECHO MEAS - AO ROOT AREA (BSA CORRECTED): 1.4
BH CV ECHO MEAS - AO ROOT AREA: 4.3 CM^2
BH CV ECHO MEAS - AO ROOT DIAM: 2.3 CM
BH CV ECHO MEAS - AO V2 MAX: 157.7 CM/SEC
BH CV ECHO MEAS - AO V2 MEAN: 119.4 CM/SEC
BH CV ECHO MEAS - AO V2 VTI: 36.1 CM
BH CV ECHO MEAS - ASC AORTA: 2.6 CM
BH CV ECHO MEAS - AVA(I,A): 1.9 CM^2
BH CV ECHO MEAS - AVA(I,D): 1.9 CM^2
BH CV ECHO MEAS - AVA(V,A): 2 CM^2
BH CV ECHO MEAS - AVA(V,D): 2 CM^2
BH CV ECHO MEAS - BSA(HAYCOCK): 1.8 M^2
BH CV ECHO MEAS - BSA: 1.7 M^2
BH CV ECHO MEAS - BZI_BMI: 33.3 KILOGRAMS/M^2
BH CV ECHO MEAS - BZI_METRIC_HEIGHT: 149.9 CM
BH CV ECHO MEAS - BZI_METRIC_WEIGHT: 74.8 KG
BH CV ECHO MEAS - EDV(CUBED): 67.7 ML
BH CV ECHO MEAS - EDV(MOD-SP4): 59.4 ML
BH CV ECHO MEAS - EDV(TEICH): 73.2 ML
BH CV ECHO MEAS - EF(CUBED): 74.4 %
BH CV ECHO MEAS - EF(MOD-SP4): 63.7 %
BH CV ECHO MEAS - EF(TEICH): 66.7 %
BH CV ECHO MEAS - ESV(CUBED): 17.4 ML
BH CV ECHO MEAS - ESV(MOD-SP4): 21.5 ML
BH CV ECHO MEAS - ESV(TEICH): 24.4 ML
BH CV ECHO MEAS - FS: 36.5 %
BH CV ECHO MEAS - IVS/LVPW: 1.2
BH CV ECHO MEAS - IVSD: 0.98 CM
BH CV ECHO MEAS - LA DIMENSION: 2.7 CM
BH CV ECHO MEAS - LA/AO: 1.2
BH CV ECHO MEAS - LV DIASTOLIC VOL/BSA (35-75): 34.9 ML/M^2
BH CV ECHO MEAS - LV MASS(C)D: 113.7 GRAMS
BH CV ECHO MEAS - LV MASS(C)DI: 66.9 GRAMS/M^2
BH CV ECHO MEAS - LV MAX PG: 5.2 MMHG
BH CV ECHO MEAS - LV MEAN PG: 2.7 MMHG
BH CV ECHO MEAS - LV SYSTOLIC VOL/BSA (12-30): 12.7 ML/M^2
BH CV ECHO MEAS - LV V1 MAX: 114.1 CM/SEC
BH CV ECHO MEAS - LV V1 MEAN: 78.5 CM/SEC
BH CV ECHO MEAS - LV V1 VTI: 24.8 CM
BH CV ECHO MEAS - LVIDD: 4.1 CM
BH CV ECHO MEAS - LVIDS: 2.6 CM
BH CV ECHO MEAS - LVOT AREA: 2.8 CM^2
BH CV ECHO MEAS - LVOT DIAM: 1.9 CM
BH CV ECHO MEAS - LVPWD: 0.83 CM
BH CV ECHO MEAS - MV A MAX VEL: 80.9 CM/SEC
BH CV ECHO MEAS - MV DEC SLOPE: 844.7 CM/SEC^2
BH CV ECHO MEAS - MV DEC TIME: 0.15 SEC
BH CV ECHO MEAS - MV E MAX VEL: 127.3 CM/SEC
BH CV ECHO MEAS - MV E/A: 1.6
BH CV ECHO MEAS - MV MAX PG: 6.9 MMHG
BH CV ECHO MEAS - MV MEAN PG: 2.7 MMHG
BH CV ECHO MEAS - MV V2 MAX: 131.3 CM/SEC
BH CV ECHO MEAS - MV V2 MEAN: 76.9 CM/SEC
BH CV ECHO MEAS - MV V2 VTI: 25.7 CM
BH CV ECHO MEAS - MVA(VTI): 2.7 CM^2
BH CV ECHO MEAS - PA ACC TIME: 0.17 SEC
BH CV ECHO MEAS - PA PR(ACCEL): 4.3 MMHG
BH CV ECHO MEAS - RAP SYSTOLE: 3 MMHG
BH CV ECHO MEAS - RV MAX PG: 2 MMHG
BH CV ECHO MEAS - RV MEAN PG: 1 MMHG
BH CV ECHO MEAS - RV V1 MAX: 71.1 CM/SEC
BH CV ECHO MEAS - RV V1 MEAN: 47.9 CM/SEC
BH CV ECHO MEAS - RV V1 VTI: 15.2 CM
BH CV ECHO MEAS - RVDD: 2.8 CM
BH CV ECHO MEAS - RVSP: 19.8 MMHG
BH CV ECHO MEAS - SI(AO): 90.6 ML/M^2
BH CV ECHO MEAS - SI(CUBED): 29.6 ML/M^2
BH CV ECHO MEAS - SI(LVOT): 40.7 ML/M^2
BH CV ECHO MEAS - SI(MOD-SP4): 22.3 ML/M^2
BH CV ECHO MEAS - SI(TEICH): 28.7 ML/M^2
BH CV ECHO MEAS - SV(AO): 154 ML
BH CV ECHO MEAS - SV(CUBED): 50.4 ML
BH CV ECHO MEAS - SV(LVOT): 69.1 ML
BH CV ECHO MEAS - SV(MOD-SP4): 37.8 ML
BH CV ECHO MEAS - SV(TEICH): 48.8 ML
BH CV ECHO MEAS - TR MAX VEL: 205.2 CM/SEC
BH CV STRESS COMMENTS STAGE 1: NORMAL
BH CV STRESS DOSE REGADENOSON STAGE 1: 0.4
BH CV STRESS DURATION MIN STAGE 1: 0
BH CV STRESS DURATION SEC STAGE 1: 10
BH CV STRESS PROTOCOL 1: NORMAL
BH CV STRESS RECOVERY BP: NORMAL MMHG
BH CV STRESS RECOVERY HR: 104 BPM
BH CV STRESS STAGE 1: 1
LV EF 2D ECHO EST: 60 %
MAXIMAL PREDICTED HEART RATE: 183 BPM
MAXIMAL PREDICTED HEART RATE: 183 BPM
STRESS BASELINE BP: NORMAL MMHG
STRESS BASELINE HR: 72 BPM
STRESS TARGET HR: 156 BPM
STRESS TARGET HR: 156 BPM

## 2020-07-01 PROCEDURE — 78452 HT MUSCLE IMAGE SPECT MULT: CPT | Performed by: INTERNAL MEDICINE

## 2020-07-01 PROCEDURE — 93016 CV STRESS TEST SUPVJ ONLY: CPT | Performed by: INTERNAL MEDICINE

## 2020-07-01 PROCEDURE — 93306 TTE W/DOPPLER COMPLETE: CPT | Performed by: INTERNAL MEDICINE

## 2020-07-01 PROCEDURE — 93306 TTE W/DOPPLER COMPLETE: CPT

## 2020-07-01 PROCEDURE — 93018 CV STRESS TEST I&R ONLY: CPT | Performed by: INTERNAL MEDICINE

## 2020-07-01 PROCEDURE — 25010000002 REGADENOSON 0.4 MG/5ML SOLUTION: Performed by: INTERNAL MEDICINE

## 2020-07-01 PROCEDURE — 0 TECHNETIUM SESTAMIBI: Performed by: INTERNAL MEDICINE

## 2020-07-01 PROCEDURE — 78452 HT MUSCLE IMAGE SPECT MULT: CPT

## 2020-07-01 PROCEDURE — 93017 CV STRESS TEST TRACING ONLY: CPT

## 2020-07-01 PROCEDURE — A9500 TC99M SESTAMIBI: HCPCS | Performed by: INTERNAL MEDICINE

## 2020-07-01 PROCEDURE — 99215 OFFICE O/P EST HI 40 MIN: CPT | Performed by: INTERNAL MEDICINE

## 2020-07-01 RX ADMIN — TECHNETIUM TC 99M SESTAMIBI 1 DOSE: 1 INJECTION INTRAVENOUS at 13:45

## 2020-07-01 RX ADMIN — REGADENOSON 0.4 MG: 0.08 INJECTION, SOLUTION INTRAVENOUS at 15:00

## 2020-07-01 NOTE — H&P (VIEW-ONLY)
Encounter Date:07/01/2020  Last seen 5/14/2020    Patient ID: Krupa Concepcion is a 35 y.o. female.    Chief Complaint:  Chest discomfort  Palpitations  History of syncope        History of Present Illness  Patient recently was seen with the following history.  The patient is a pleasant 35-year-old white female is here for evaluation of above problems.  Patient went to the emergency room at [facility] yesterday and was evaluated.  EKG showed no acute changes.  Troponin level was negative.  She was found to be anemic.  Patient has been having chest discomfort off and on exertional left precordial sharp pain with radiation into the left arm associated with some palpitations and shortness of breath.  Patient also has been having episodes of syncope and palpitations.  Patient had work-up in the past that showed premature ventricular contractions.  No other associated aggravating or elevating factors.  Symptoms are moderate in nature sometimes when she wears N 95 mask at work she would have dizziness.     Denies having any headache ,abdominal pain ,nausea, vomiting , diarrhea constipation, loss of weight or loss of appetite.  Denies having any excessive bruising ,hematuria or blood in the stool.     Review of all systems negative except as indicated     Assessment and Plan         ]]]]]]]]]]]]]]]]]]]]]]]  Impression  ==============  -Chest discomfort palpitations and history of syncope  Echocardiogram-normal 7/1/2020  Lexiscan Cardiolite test 7/1/2020 showed anterior and apical ischemia with ejection fraction of 70%.     -Hypothyroidism GERD bipolar disorder history of depression vitamin D deficiency     -History of premature ventricular contractions     -Anemia with hemoglobin of 9.6     -Status post tonsillectomy     -Former smoker     -Allergic to latex Ambien benzonatate trazodone cefdinir tea tree oil parabens  =========  Plan  ========  Patient has above symptoms.  Recent EKG showed no acute  changes.  Patient has anemia with hemoglobin of 9.6.  This may be playing a role with her symptoms.  Ischemic cardiac work-up  Stress Cardiolite test-abnormal as above  Echocardiogram.-Normal  Patient was advised cardiac catheterization and coronary arteriography.  Risks and benefits pros and cons of the procedure were discussed with patient.  Patient may benefit from 30-day event monitor to assess for any dysrhythmia causing syncopal episodes.  Further plan will depend on patient's progress.  ]]]]]]]]]]]]]]]]                  Diagnosis Plan   1. Abnormal nuclear stress test  Case Request Cath Lab: Left Heart Cath with Coronary Angiography    CBC (No Diff)    Basic Metabolic Panel    Protime-INR    Lipid Panel    ECG 12 Lead   2. Chest discomfort  Case Request Cath Lab: Left Heart Cath with Coronary Angiography    CBC (No Diff)    Basic Metabolic Panel    Protime-INR    Lipid Panel    ECG 12 Lead   3. Syncope and collapse     LAB RESULTS (LAST 7 DAYS)    CBC        BMP        CMP         BNP        TROPONIN        CoAg        Creatinine Clearance  CrCl cannot be calculated (Patient's most recent lab result is older than the maximum 30 days allowed.).    ABG        Radiology  No radiology results for the last day                The following portions of the patient's history were reviewed and updated as appropriate: allergies, current medications, past family history, past medical history, past social history, past surgical history and problem list.    Review of Systems   Constitution: Negative for malaise/fatigue.   Cardiovascular: Negative for chest pain, leg swelling, palpitations and syncope.   Respiratory: Negative for shortness of breath.    Skin: Negative for rash.   Gastrointestinal: Negative for nausea and vomiting.   Neurological: Negative for dizziness, light-headedness and numbness.         Current Outpatient Medications:   •  ARIPiprazole (ABILIFY) 30 MG tablet, Take 30 mg by mouth Every Morning., Disp:  , Rfl:   •  cetirizine (zyrTEC) 10 MG tablet, Take 10 mg by mouth 2 (Two) Times a Day., Disp: , Rfl:   •  EPINEPHrine (EPIPEN) 0.3 MG/0.3ML solution auto-injector injection, 0.3 mg 1 (One) Time As Needed., Disp: , Rfl:   •  FLUoxetine (PROzac) 20 MG capsule, Take 60 mg by mouth Every Morning., Disp: , Rfl:   •  levothyroxine (SYNTHROID, LEVOTHROID) 50 MCG tablet, Take 1 tablet by mouth Daily., Disp: 30 tablet, Rfl: 2  •  meloxicam (MOBIC) 15 MG tablet, Take 1 tablet by mouth Daily As Needed for Mild Pain ., Disp: 30 tablet, Rfl: 4  •  Multiple Vitamins-Minerals (MULTIVITAMIN WITH MINERALS) tablet tablet, Take 1 tablet by mouth Daily., Disp: , Rfl:   •  omeprazole (priLOSEC) 40 MG capsule, Take 40 mg by mouth every night at bedtime., Disp: , Rfl:   •  PARAGARD INTRAUTERINE COPPER intrauterine device IUD, 1 each by Intrauterine route 1 (One) Time., Disp: , Rfl:   •  QUEtiapine (SEROquel) 200 MG tablet, Take 200 mg by mouth Every Night., Disp: , Rfl:   •  ROZEREM 8 MG tablet, Take 8 mg by mouth Every Night., Disp: , Rfl:   •  saccharomyces boulardii (FLORASTOR) 250 MG capsule, Take 250 mg by mouth 2 (Two) Times a Day., Disp: , Rfl:   •  topiramate (TOPAMAX) 200 MG tablet, Take 200 mg by mouth every night at bedtime., Disp: , Rfl:   No current facility-administered medications for this visit.     Allergies   Allergen Reactions   • Latex Itching   • Parabens Anaphylaxis   • Tea Tree Oil Anaphylaxis   • Zolpidem Tartrate Mental Status Change   • Benzonatate Other (See Comments)   • Trazodone Other (See Comments)   • Cefdinir Hallucinations     hallucinations       Family History   Problem Relation Age of Onset   • Hypertension Mother    • Migraines Mother    • Osteoarthritis Mother    • Hyperlipidemia Father    • Hypertension Father        Past Surgical History:   Procedure Laterality Date   • COLONOSCOPY     • DENTAL PROCEDURE     • EAR TUBES Bilateral     x2 times   • ENDOSCOPY     • TONSILLECTOMY         Past Medical  "History:   Diagnosis Date   • Anxiety    • Arrhythmia    • Asthma    • Chest pain 8/14/2019   • Depression    • Disease of thyroid gland    • GERD (gastroesophageal reflux disease)    • Migraine    • Schizoaffective disorder (CMS/HCC)        Family History   Problem Relation Age of Onset   • Hypertension Mother    • Migraines Mother    • Osteoarthritis Mother    • Hyperlipidemia Father    • Hypertension Father        Social History     Socioeconomic History   • Marital status:      Spouse name: Not on file   • Number of children: Not on file   • Years of education: Not on file   • Highest education level: Not on file   Tobacco Use   • Smoking status: Former Smoker   • Smokeless tobacco: Never Used   Substance and Sexual Activity   • Alcohol use: Yes     Alcohol/week: 2.0 standard drinks     Types: 2 Glasses of wine per week     Frequency: Never   • Drug use: No   • Sexual activity: Defer     Birth control/protection: Implant         Procedures      Objective:       Physical Exam    /74   Pulse 73   Ht 149.9 cm (59\")   Wt 74.8 kg (165 lb)   BMI 33.33 kg/m²   The patient is alert, oriented and in no distress.    Vital signs as noted above.    Head and neck revealed no carotid bruits or jugular venous distension.  No thyromegaly or lymphadenopathy is present.    Lungs clear.  No wheezing.  Breath sounds are normal bilaterally.    Heart normal first and second heart sounds.  No murmur..  No pericardial rub is present.  No gallop is present.    Abdomen soft and nontender.  No organomegaly is present.    Extremities revealed good peripheral pulses without any pedal edema.    Skin warm and dry.    Musculoskeletal system is grossly normal.    CNS grossly normal.        "

## 2020-07-01 NOTE — PROGRESS NOTES
Encounter Date:07/01/2020  Last seen 5/14/2020    Patient ID: Krupa Concepcion is a 35 y.o. female.    Chief Complaint:  Chest discomfort  Palpitations  History of syncope        History of Present Illness  Patient recently was seen with the following history.  The patient is a pleasant 35-year-old white female is here for evaluation of above problems.  Patient went to the emergency room at [facility] yesterday and was evaluated.  EKG showed no acute changes.  Troponin level was negative.  She was found to be anemic.  Patient has been having chest discomfort off and on exertional left precordial sharp pain with radiation into the left arm associated with some palpitations and shortness of breath.  Patient also has been having episodes of syncope and palpitations.  Patient had work-up in the past that showed premature ventricular contractions.  No other associated aggravating or elevating factors.  Symptoms are moderate in nature sometimes when she wears N 95 mask at work she would have dizziness.     Denies having any headache ,abdominal pain ,nausea, vomiting , diarrhea constipation, loss of weight or loss of appetite.  Denies having any excessive bruising ,hematuria or blood in the stool.     Review of all systems negative except as indicated     Assessment and Plan         ]]]]]]]]]]]]]]]]]]]]]]]  Impression  ==============  -Chest discomfort palpitations and history of syncope  Echocardiogram-normal 7/1/2020  Lexiscan Cardiolite test 7/1/2020 showed anterior and apical ischemia with ejection fraction of 70%.     -Hypothyroidism GERD bipolar disorder history of depression vitamin D deficiency     -History of premature ventricular contractions     -Anemia with hemoglobin of 9.6     -Status post tonsillectomy     -Former smoker     -Allergic to latex Ambien benzonatate trazodone cefdinir tea tree oil parabens  =========  Plan  ========  Patient has above symptoms.  Recent EKG showed no acute  changes.  Patient has anemia with hemoglobin of 9.6.  This may be playing a role with her symptoms.  Ischemic cardiac work-up  Stress Cardiolite test-abnormal as above  Echocardiogram.-Normal  Patient was advised cardiac catheterization and coronary arteriography.  Risks and benefits pros and cons of the procedure were discussed with patient.  Patient may benefit from 30-day event monitor to assess for any dysrhythmia causing syncopal episodes.  Further plan will depend on patient's progress.  ]]]]]]]]]]]]]]]]                  Diagnosis Plan   1. Abnormal nuclear stress test  Case Request Cath Lab: Left Heart Cath with Coronary Angiography    CBC (No Diff)    Basic Metabolic Panel    Protime-INR    Lipid Panel    ECG 12 Lead   2. Chest discomfort  Case Request Cath Lab: Left Heart Cath with Coronary Angiography    CBC (No Diff)    Basic Metabolic Panel    Protime-INR    Lipid Panel    ECG 12 Lead   3. Syncope and collapse     LAB RESULTS (LAST 7 DAYS)    CBC        BMP        CMP         BNP        TROPONIN        CoAg        Creatinine Clearance  CrCl cannot be calculated (Patient's most recent lab result is older than the maximum 30 days allowed.).    ABG        Radiology  No radiology results for the last day                The following portions of the patient's history were reviewed and updated as appropriate: allergies, current medications, past family history, past medical history, past social history, past surgical history and problem list.    Review of Systems   Constitution: Negative for malaise/fatigue.   Cardiovascular: Negative for chest pain, leg swelling, palpitations and syncope.   Respiratory: Negative for shortness of breath.    Skin: Negative for rash.   Gastrointestinal: Negative for nausea and vomiting.   Neurological: Negative for dizziness, light-headedness and numbness.         Current Outpatient Medications:   •  ARIPiprazole (ABILIFY) 30 MG tablet, Take 30 mg by mouth Every Morning., Disp:  , Rfl:   •  cetirizine (zyrTEC) 10 MG tablet, Take 10 mg by mouth 2 (Two) Times a Day., Disp: , Rfl:   •  EPINEPHrine (EPIPEN) 0.3 MG/0.3ML solution auto-injector injection, 0.3 mg 1 (One) Time As Needed., Disp: , Rfl:   •  FLUoxetine (PROzac) 20 MG capsule, Take 60 mg by mouth Every Morning., Disp: , Rfl:   •  levothyroxine (SYNTHROID, LEVOTHROID) 50 MCG tablet, Take 1 tablet by mouth Daily., Disp: 30 tablet, Rfl: 2  •  meloxicam (MOBIC) 15 MG tablet, Take 1 tablet by mouth Daily As Needed for Mild Pain ., Disp: 30 tablet, Rfl: 4  •  Multiple Vitamins-Minerals (MULTIVITAMIN WITH MINERALS) tablet tablet, Take 1 tablet by mouth Daily., Disp: , Rfl:   •  omeprazole (priLOSEC) 40 MG capsule, Take 40 mg by mouth every night at bedtime., Disp: , Rfl:   •  PARAGARD INTRAUTERINE COPPER intrauterine device IUD, 1 each by Intrauterine route 1 (One) Time., Disp: , Rfl:   •  QUEtiapine (SEROquel) 200 MG tablet, Take 200 mg by mouth Every Night., Disp: , Rfl:   •  ROZEREM 8 MG tablet, Take 8 mg by mouth Every Night., Disp: , Rfl:   •  saccharomyces boulardii (FLORASTOR) 250 MG capsule, Take 250 mg by mouth 2 (Two) Times a Day., Disp: , Rfl:   •  topiramate (TOPAMAX) 200 MG tablet, Take 200 mg by mouth every night at bedtime., Disp: , Rfl:   No current facility-administered medications for this visit.     Allergies   Allergen Reactions   • Latex Itching   • Parabens Anaphylaxis   • Tea Tree Oil Anaphylaxis   • Zolpidem Tartrate Mental Status Change   • Benzonatate Other (See Comments)   • Trazodone Other (See Comments)   • Cefdinir Hallucinations     hallucinations       Family History   Problem Relation Age of Onset   • Hypertension Mother    • Migraines Mother    • Osteoarthritis Mother    • Hyperlipidemia Father    • Hypertension Father        Past Surgical History:   Procedure Laterality Date   • COLONOSCOPY     • DENTAL PROCEDURE     • EAR TUBES Bilateral     x2 times   • ENDOSCOPY     • TONSILLECTOMY         Past Medical  "History:   Diagnosis Date   • Anxiety    • Arrhythmia    • Asthma    • Chest pain 8/14/2019   • Depression    • Disease of thyroid gland    • GERD (gastroesophageal reflux disease)    • Migraine    • Schizoaffective disorder (CMS/HCC)        Family History   Problem Relation Age of Onset   • Hypertension Mother    • Migraines Mother    • Osteoarthritis Mother    • Hyperlipidemia Father    • Hypertension Father        Social History     Socioeconomic History   • Marital status:      Spouse name: Not on file   • Number of children: Not on file   • Years of education: Not on file   • Highest education level: Not on file   Tobacco Use   • Smoking status: Former Smoker   • Smokeless tobacco: Never Used   Substance and Sexual Activity   • Alcohol use: Yes     Alcohol/week: 2.0 standard drinks     Types: 2 Glasses of wine per week     Frequency: Never   • Drug use: No   • Sexual activity: Defer     Birth control/protection: Implant         Procedures      Objective:       Physical Exam    /74   Pulse 73   Ht 149.9 cm (59\")   Wt 74.8 kg (165 lb)   BMI 33.33 kg/m²   The patient is alert, oriented and in no distress.    Vital signs as noted above.    Head and neck revealed no carotid bruits or jugular venous distension.  No thyromegaly or lymphadenopathy is present.    Lungs clear.  No wheezing.  Breath sounds are normal bilaterally.    Heart normal first and second heart sounds.  No murmur..  No pericardial rub is present.  No gallop is present.    Abdomen soft and nontender.  No organomegaly is present.    Extremities revealed good peripheral pulses without any pedal edema.    Skin warm and dry.    Musculoskeletal system is grossly normal.    CNS grossly normal.        "

## 2020-07-06 ENCOUNTER — LAB (OUTPATIENT)
Dept: LAB | Facility: HOSPITAL | Age: 35
End: 2020-07-06

## 2020-07-06 ENCOUNTER — HOSPITAL ENCOUNTER (OUTPATIENT)
Dept: CARDIOLOGY | Facility: HOSPITAL | Age: 35
Discharge: HOME | End: 2020-07-06
Admitting: INTERNAL MEDICINE

## 2020-07-06 DIAGNOSIS — R94.39 ABNORMAL NUCLEAR STRESS TEST: ICD-10-CM

## 2020-07-06 DIAGNOSIS — R07.89 CHEST DISCOMFORT: ICD-10-CM

## 2020-07-06 LAB
ANION GAP SERPL CALCULATED.3IONS-SCNC: 10.8 MMOL/L (ref 5–15)
BUN SERPL-MCNC: 19 MG/DL (ref 6–20)
BUN/CREAT SERPL: 19.6 (ref 7–25)
CALCIUM SPEC-SCNC: 9.4 MG/DL (ref 8.6–10.5)
CHLORIDE SERPL-SCNC: 110 MMOL/L (ref 98–107)
CHOLEST SERPL-MCNC: 137 MG/DL (ref 0–200)
CO2 SERPL-SCNC: 19.2 MMOL/L (ref 22–29)
CREAT SERPL-MCNC: 0.97 MG/DL (ref 0.57–1)
DEPRECATED RDW RBC AUTO: 55.2 FL (ref 37–54)
ERYTHROCYTE [DISTWIDTH] IN BLOOD BY AUTOMATED COUNT: 17 % (ref 12.3–15.4)
GFR SERPL CREATININE-BSD FRML MDRD: 65 ML/MIN/1.73
GLUCOSE SERPL-MCNC: 59 MG/DL (ref 65–99)
HCT VFR BLD AUTO: 33.6 % (ref 34–46.6)
HDLC SERPL-MCNC: 35 MG/DL (ref 40–60)
HGB BLD-MCNC: 10.9 G/DL (ref 12–15.9)
INR PPP: 0.99 (ref 0.9–1.1)
LDLC SERPL CALC-MCNC: 67 MG/DL (ref 0–100)
LDLC/HDLC SERPL: 1.93 {RATIO}
MCH RBC QN AUTO: 28.7 PG (ref 26.6–33)
MCHC RBC AUTO-ENTMCNC: 32.4 G/DL (ref 31.5–35.7)
MCV RBC AUTO: 88.4 FL (ref 79–97)
PLATELET # BLD AUTO: 252 10*3/MM3 (ref 140–450)
PMV BLD AUTO: 11.1 FL (ref 6–12)
POTASSIUM SERPL-SCNC: 3.7 MMOL/L (ref 3.5–5.2)
PROTHROMBIN TIME: 10.4 SECONDS (ref 9.6–11.7)
RBC # BLD AUTO: 3.8 10*6/MM3 (ref 3.77–5.28)
SODIUM SERPL-SCNC: 140 MMOL/L (ref 136–145)
TRIGL SERPL-MCNC: 173 MG/DL (ref 0–150)
VLDLC SERPL-MCNC: 34.6 MG/DL (ref 5–40)
WBC # BLD AUTO: 6.12 10*3/MM3 (ref 3.4–10.8)

## 2020-07-06 PROCEDURE — 80048 BASIC METABOLIC PNL TOTAL CA: CPT

## 2020-07-06 PROCEDURE — 36415 COLL VENOUS BLD VENIPUNCTURE: CPT

## 2020-07-06 PROCEDURE — 80061 LIPID PANEL: CPT

## 2020-07-06 PROCEDURE — 93005 ELECTROCARDIOGRAM TRACING: CPT | Performed by: INTERNAL MEDICINE

## 2020-07-06 PROCEDURE — 85610 PROTHROMBIN TIME: CPT

## 2020-07-06 PROCEDURE — U0002 COVID-19 LAB TEST NON-CDC: HCPCS | Performed by: INTERNAL MEDICINE

## 2020-07-06 PROCEDURE — C9803 HOPD COVID-19 SPEC COLLECT: HCPCS | Performed by: INTERNAL MEDICINE

## 2020-07-06 PROCEDURE — U0004 COV-19 TEST NON-CDC HGH THRU: HCPCS | Performed by: INTERNAL MEDICINE

## 2020-07-06 PROCEDURE — 85027 COMPLETE CBC AUTOMATED: CPT

## 2020-07-07 LAB
REF LAB TEST METHOD: NORMAL
SARS-COV-2 RNA RESP QL NAA+PROBE: NOT DETECTED

## 2020-07-08 ENCOUNTER — HOSPITAL ENCOUNTER (OUTPATIENT)
Facility: HOSPITAL | Age: 35
Setting detail: HOSPITAL OUTPATIENT SURGERY
Discharge: HOME | End: 2020-07-08
Attending: INTERNAL MEDICINE | Admitting: INTERNAL MEDICINE

## 2020-07-08 VITALS
RESPIRATION RATE: 16 BRPM | HEIGHT: 60 IN | BODY MASS INDEX: 34.37 KG/M2 | TEMPERATURE: 97.9 F | HEART RATE: 101 BPM | WEIGHT: 175.04 LBS | SYSTOLIC BLOOD PRESSURE: 118 MMHG | DIASTOLIC BLOOD PRESSURE: 65 MMHG | OXYGEN SATURATION: 98 %

## 2020-07-08 DIAGNOSIS — R94.39 ABNORMAL NUCLEAR STRESS TEST: ICD-10-CM

## 2020-07-08 DIAGNOSIS — R07.89 CHEST DISCOMFORT: ICD-10-CM

## 2020-07-08 LAB — B-HCG UR QL: NEGATIVE

## 2020-07-08 PROCEDURE — 25010000002 FENTANYL CITRATE (PF) 100 MCG/2ML SOLUTION: Performed by: INTERNAL MEDICINE

## 2020-07-08 PROCEDURE — 99152 MOD SED SAME PHYS/QHP 5/>YRS: CPT | Performed by: INTERNAL MEDICINE

## 2020-07-08 PROCEDURE — 93458 L HRT ARTERY/VENTRICLE ANGIO: CPT | Performed by: INTERNAL MEDICINE

## 2020-07-08 PROCEDURE — 0 IOPAMIDOL PER 1 ML: Performed by: INTERNAL MEDICINE

## 2020-07-08 PROCEDURE — 25010000002 MIDAZOLAM PER 1 MG: Performed by: INTERNAL MEDICINE

## 2020-07-08 PROCEDURE — 25010000002 METHYLPREDNISOLONE PER 125 MG: Performed by: INTERNAL MEDICINE

## 2020-07-08 PROCEDURE — C1894 INTRO/SHEATH, NON-LASER: HCPCS | Performed by: INTERNAL MEDICINE

## 2020-07-08 PROCEDURE — 81025 URINE PREGNANCY TEST: CPT | Performed by: INTERNAL MEDICINE

## 2020-07-08 PROCEDURE — C1769 GUIDE WIRE: HCPCS | Performed by: INTERNAL MEDICINE

## 2020-07-08 RX ORDER — LIDOCAINE HYDROCHLORIDE 20 MG/ML
INJECTION, SOLUTION INFILTRATION; PERINEURAL AS NEEDED
Status: DISCONTINUED | OUTPATIENT
Start: 2020-07-08 | End: 2020-07-08 | Stop reason: HOSPADM

## 2020-07-08 RX ORDER — MIDAZOLAM HYDROCHLORIDE 1 MG/ML
INJECTION INTRAMUSCULAR; INTRAVENOUS AS NEEDED
Status: DISCONTINUED | OUTPATIENT
Start: 2020-07-08 | End: 2020-07-08 | Stop reason: HOSPADM

## 2020-07-08 RX ORDER — ACETAMINOPHEN 325 MG/1
650 TABLET ORAL EVERY 4 HOURS PRN
Status: DISCONTINUED | OUTPATIENT
Start: 2020-07-08 | End: 2020-07-08 | Stop reason: HOSPADM

## 2020-07-08 RX ORDER — METHYLPREDNISOLONE SODIUM SUCCINATE 125 MG/2ML
INJECTION, POWDER, LYOPHILIZED, FOR SOLUTION INTRAMUSCULAR; INTRAVENOUS AS NEEDED
Status: DISCONTINUED | OUTPATIENT
Start: 2020-07-08 | End: 2020-07-08 | Stop reason: HOSPADM

## 2020-07-08 RX ORDER — FENTANYL CITRATE 50 UG/ML
INJECTION, SOLUTION INTRAMUSCULAR; INTRAVENOUS AS NEEDED
Status: DISCONTINUED | OUTPATIENT
Start: 2020-07-08 | End: 2020-07-08 | Stop reason: HOSPADM

## 2020-07-08 RX ORDER — ONDANSETRON 2 MG/ML
4 INJECTION INTRAMUSCULAR; INTRAVENOUS EVERY 6 HOURS PRN
Status: DISCONTINUED | OUTPATIENT
Start: 2020-07-08 | End: 2020-07-08 | Stop reason: HOSPADM

## 2020-07-08 RX ORDER — SODIUM CHLORIDE 9 MG/ML
250 INJECTION, SOLUTION INTRAVENOUS ONCE AS NEEDED
Status: DISCONTINUED | OUTPATIENT
Start: 2020-07-08 | End: 2020-07-08 | Stop reason: HOSPADM

## 2020-07-08 RX ORDER — ONDANSETRON 4 MG/1
4 TABLET, FILM COATED ORAL EVERY 6 HOURS PRN
Status: DISCONTINUED | OUTPATIENT
Start: 2020-07-08 | End: 2020-07-08 | Stop reason: HOSPADM

## 2020-07-08 RX ORDER — DIPHENHYDRAMINE HCL 25 MG
25 TABLET ORAL EVERY 6 HOURS PRN
Status: DISCONTINUED | OUTPATIENT
Start: 2020-07-08 | End: 2020-07-08 | Stop reason: HOSPADM

## 2020-07-08 NOTE — DISCHARGE INSTR - DIET
Post Cath Instructions    Call Dr. Langley's office to schedule a follow up appointment in 2-4 weeks at [phone].    1) Drink plenty of fluids for the next 24 hours.  This helps to eliminate the dye used in your procedure through urination.  You may resume a normal diet; however, try to avoid foods that would cause gas or constipation.    2) Sedative medication given to you during your catheterization may decrease your judgement and reaction time for up to 24-48 hours.  Therefore:  a. DO NOT drive or operate hazardous machinery (48 hours)  b. DO NOT consume alcoholic beverages  c. DO NOT make any important/legal decisions  d. Have someone stay with you for at least 24 hours    3) To allow proper healing and prevent bleeding, the following activities are to be strictly avoided for the next 24-48 hours:  a. Excessive bending at wound site  b. Straining (anything that would tense up muscles around the affected puncture site)  c. Lifting objects greater than 5 pounds, pushing, or pulling for 5 days  i. For Groin Cases:  1. Refrain from sexual activity  2. Refrain from running or vigorous walking  3. No prolonged sitting or standing  4. Limit stair climbing as much as possible    4) Keep the puncture site clean and dry.  You may remove the dressing tomorrow and replace it with a band-aid for at least one additional day.  Gently clean the site with mild soap and water.  No scrubbing/rubbing and lightly pat the area dry.  Showers are acceptable; however, avoid submerging in water (tub baths, hot tubs, swimming pools, dishwater, etc…) for at least one week.  The site should be completely healed before resuming these activities to reduce the risk of infection.  Check the site often.  Watch for signs and symptoms of infection and notify your physician if any of the following occur:  a. Bleeding or an increase in swelling at the puncture site  b. Fever  c. Increased soreness around puncture site  d. Foul odor or significant  drainage from the puncture site  e. Swelling, redness, or warmth at the puncture site    **A bruise or small “pea sized” lump under the skin at the puncture site is not unusual.  This should disappear within 3-4 weeks.**  5) CONTACT YOUR PHYSICIAN OR CALL 911 IF YOU EXPERIENCE ANY OF THE FOLLOWING:  a. Increased angina (chest pain) or frequent sensations of pressure, burning, pain, or other discomfort in the chest, arm, jaws, or stomach  b. Lightheadedness, dizziness, faint feeling, sweating, or difficulty breathing  c. Odd sensation changes like numbness, tingling, coldness, or pain in the arm or leg in which the catheter was inserted  d. Limb in which the catheter was inserted becomes pale/bluish in color    IMPORTANT:  Although this occurs very rarely, if you should develop bright red or excessive bleeding, feel a “pop” inside at the insertion site, or notice a sudden increase in swelling larger than a walnut, you should call 911.  Hold continuous firm pressure to the access site until emergency personnel arrive.  It is best if someone else can do this for you.

## 2020-07-13 ENCOUNTER — OFFICE VISIT (OUTPATIENT)
Dept: FAMILY MEDICINE CLINIC | Facility: CLINIC | Age: 35
End: 2020-07-13

## 2020-07-13 VITALS
OXYGEN SATURATION: 98 % | DIASTOLIC BLOOD PRESSURE: 78 MMHG | SYSTOLIC BLOOD PRESSURE: 116 MMHG | WEIGHT: 180.2 LBS | RESPIRATION RATE: 20 BRPM | HEART RATE: 98 BPM | BODY MASS INDEX: 35.38 KG/M2 | HEIGHT: 60 IN

## 2020-07-13 DIAGNOSIS — M25.551 HIP PAIN, CHRONIC, RIGHT: ICD-10-CM

## 2020-07-13 DIAGNOSIS — G89.29 HIP PAIN, CHRONIC, RIGHT: ICD-10-CM

## 2020-07-13 DIAGNOSIS — E03.9 ACQUIRED HYPOTHYROIDISM: Primary | Chronic | ICD-10-CM

## 2020-07-13 DIAGNOSIS — R07.9 CHEST PAIN, UNSPECIFIED TYPE: ICD-10-CM

## 2020-07-13 DIAGNOSIS — J30.9 ALLERGIC RHINITIS, UNSPECIFIED SEASONALITY, UNSPECIFIED TRIGGER: ICD-10-CM

## 2020-07-13 DIAGNOSIS — F31.60 BIPOLAR 1 DISORDER, MIXED (HCC): Chronic | ICD-10-CM

## 2020-07-13 DIAGNOSIS — K21.9 GASTROESOPHAGEAL REFLUX DISEASE WITHOUT ESOPHAGITIS: Chronic | ICD-10-CM

## 2020-07-13 PROBLEM — J40 BRONCHITIS: Status: RESOLVED | Noted: 2020-03-23 | Resolved: 2020-07-13

## 2020-07-13 PROBLEM — R94.39 ABNORMAL NUCLEAR STRESS TEST: Status: RESOLVED | Noted: 2020-07-01 | Resolved: 2020-07-13

## 2020-07-13 PROBLEM — R07.89 CHEST DISCOMFORT: Status: RESOLVED | Noted: 2020-07-01 | Resolved: 2020-07-13

## 2020-07-13 PROCEDURE — 99214 OFFICE O/P EST MOD 30 MIN: CPT | Performed by: FAMILY MEDICINE

## 2020-07-13 RX ORDER — LEVOTHYROXINE SODIUM 0.05 MG/1
50 TABLET ORAL DAILY
Qty: 30 TABLET | Refills: 5 | Status: SHIPPED | OUTPATIENT
Start: 2020-07-13 | End: 2020-12-30 | Stop reason: SDUPTHER

## 2020-07-13 NOTE — PATIENT INSTRUCTIONS
Continue your current medications and treatment.    Follow up with cardiology.    Follow up in the office in 6 months.    Laboratory testing at that time.

## 2020-07-13 NOTE — PROGRESS NOTES
"Subjective   Krupa Concepcion is a 35 y.o. female.     Chief Complaint   Patient presents with   • Follow-up     cardiac cath       HPI  Chief complaint: Chest pain depression hypothyroidism allergic rhinitis    The patient is a 35-year-old white female comes in for follow-up and maintenance of her current problems which include    1.  Chest pain-stable-patient was recently hospitalized because of chest pain and abnormal stress Myoview.  The patient underwent a left heart catheterization which revealed normal coronary arteries and normal heart function.  The patient states that she has a follow-up appoint with her cardiologist.  She states he is going to put her on a medication to slow her heart rate down.  Patient states that when she gets stressed she passes out.    2.  Hypothyroidism-stable-patient is currently on Synthroid 105 0 mg a day.  She denied heat or cold intolerance tremor or weight gain or weight loss.    3.  Allergic rhinitis-stable-patient on antihistamines.    4.  Bipolar disease-stable-patient on Abilify 30 mg a day Seroquel 200 mg a day Ros around 8 mg at night Prozac 60 mg daily Topamax 200 mg at night.  She denies anxiousness agitation depressed or suicidal thoughts.  States her life is much better since she is living alone.    5.  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  She currently is on Mobic.  She is planning on going to physical therapy.        The following portions of the patient's history were reviewed and updated as appropriate: allergies, current medications, past family history, past medical history, past social history, past surgical history and problem list.    Review of Systems    Objective     /78 (BP Location: Left arm, Patient Position: Sitting, Cuff Size: Adult)   Pulse 98   Resp 20   Ht 151.1 cm (59.5\")   Wt 81.7 kg (180 lb 3.2 oz)   SpO2 98%   BMI 35.79 kg/m²     Physical Exam "   Constitutional: She is oriented to person, place, and time. She appears well-developed and well-nourished.   HENT:   Head: Normocephalic and atraumatic.   Eyes: Pupils are equal, round, and reactive to light. Conjunctivae and EOM are normal.   Neck: Normal range of motion. Neck supple.   Cardiovascular: Normal rate, regular rhythm, normal heart sounds and intact distal pulses.   Pulmonary/Chest: Effort normal and breath sounds normal.   Abdominal: Soft. Bowel sounds are normal.   Musculoskeletal: Normal range of motion.   Neurological: She is alert and oriented to person, place, and time.   Skin: Skin is warm and dry.   Psychiatric: She has a normal mood and affect. Her behavior is normal.   Nursing note and vitals reviewed.        Assessment/Plan   Krupa was seen today for follow-up.    Diagnoses and all orders for this visit:    Acquired hypothyroidism    Chest pain, unspecified type    Gastroesophageal reflux disease without esophagitis    Allergic rhinitis, unspecified seasonality, unspecified trigger    Bipolar 1 disorder, mixed (CMS/HCC)    Hip pain, chronic, right      Patient Instructions   Continue your current medications and treatment.    Follow up with cardiology.    Follow up in the office in 6 months.    Laboratory testing at that time.      Erlin Allred Jr., MD    07/13/20

## 2020-07-15 PROCEDURE — 93010 ELECTROCARDIOGRAM REPORT: CPT | Performed by: INTERNAL MEDICINE

## 2020-08-05 ENCOUNTER — OFFICE VISIT (OUTPATIENT)
Dept: CARDIOLOGY | Facility: CLINIC | Age: 35
End: 2020-08-05

## 2020-08-05 VITALS
SYSTOLIC BLOOD PRESSURE: 100 MMHG | WEIGHT: 176 LBS | BODY MASS INDEX: 35.48 KG/M2 | HEART RATE: 94 BPM | OXYGEN SATURATION: 98 % | HEIGHT: 59 IN | DIASTOLIC BLOOD PRESSURE: 70 MMHG

## 2020-08-05 DIAGNOSIS — R55 SYNCOPE AND COLLAPSE: ICD-10-CM

## 2020-08-05 DIAGNOSIS — R07.89 CHEST DISCOMFORT: Primary | ICD-10-CM

## 2020-08-05 PROCEDURE — 99213 OFFICE O/P EST LOW 20 MIN: CPT | Performed by: INTERNAL MEDICINE

## 2020-08-05 NOTE — PROGRESS NOTES
Encounter Date:08/05/2020  Last seen 7/1/2020      Patient ID: Krupa Concepcion is a 35 y.o. female.    Chief Complaint:  Recent hospital follow-up with chest pain  Recent cardiac catheterization  Premature ventricular contractions      History of Present Illness  Patient recently had cardiac catheterization.  Patient was discharged home.    Since I have last seen, the patient has been without any chest discomfort ,shortness of breath, palpitations, dizziness or syncope.  Denies having any headache ,abdominal pain ,nausea, vomiting , diarrhea constipation, loss of weight or loss of appetite.  Denies having any excessive bruising ,hematuria or blood in the stool.    Review of all systems negative except as indicated    Assessment and Plan       ]]]]]]]]]]]]]]]]]]]]]]]  Impression  ==============  -Chest discomfort palpitations and history of syncope-improved    Echocardiogram-normal 7/1/2020  Lexiscan Cardiolite test 7/1/2020 showed anterior and apical ischemia with ejection fraction of 70%.    Cardiac catheterization 7/8/2020  Left ventricle size and contractility is normal with ejection fraction of 60%.  Normal epicardial coronary arteries.     -Hypothyroidism GERD bipolar disorder history of depression vitamin D deficiency     -History of premature ventricular contractions     -Anemia with hemoglobin of 9.6     -Status post tonsillectomy     -Former smoker     -Allergic to latex Ambien benzonatate trazodone cefdinir tea tree oil parabens  =========  Plan  ========  Patient not having any angina pectoris or congestive heart failure.  No further episodes of syncope.  Recent cardiac catheterization results were discussed with patient and educated her.  Patient has anemia with hemoglobin of 9.6.  This may be playing a role with her symptoms.  Follow-up with primary care regarding this.  Follow-up in the office as needed.  Further plan will depend on patient's progress.  ]]]]]]]]]]]]]]]]           Diagnosis Plan   1.  Chest discomfort     2. Syncope and collapse     LAB RESULTS (LAST 7 DAYS)    CBC        BMP        CMP         BNP        TROPONIN        CoAg        Creatinine Clearance  CrCl cannot be calculated (Patient's most recent lab result is older than the maximum 30 days allowed.).    ABG        Radiology  No radiology results for the last day                The following portions of the patient's history were reviewed and updated as appropriate: allergies, current medications, past family history, past medical history, past social history, past surgical history and problem list.    Review of Systems   Constitution: Negative for fever and malaise/fatigue.   HENT: Negative for congestion and hearing loss.    Eyes: Negative for double vision and visual disturbance.   Cardiovascular: Positive for chest pain, dyspnea on exertion and palpitations (no change). Negative for claudication, leg swelling and syncope.   Respiratory: Negative for cough and shortness of breath.    Endocrine: Negative for cold intolerance.   Skin: Negative for color change and rash.   Musculoskeletal: Negative for arthritis and joint pain.   Gastrointestinal: Negative for abdominal pain and heartburn.   Genitourinary: Negative for hematuria.   Neurological: Negative for excessive daytime sleepiness and dizziness.   Psychiatric/Behavioral: Negative for depression. The patient is not nervous/anxious.    All other systems reviewed and are negative.        Current Outpatient Medications:   •  ARIPiprazole (ABILIFY) 30 MG tablet, Take 30 mg by mouth Every Morning., Disp: , Rfl:   •  cetirizine (zyrTEC) 10 MG tablet, Take 10 mg by mouth 2 (Two) Times a Day., Disp: , Rfl:   •  EPINEPHrine (EPIPEN) 0.3 MG/0.3ML solution auto-injector injection, 0.3 mg 1 (One) Time As Needed., Disp: , Rfl:   •  FLUoxetine (PROzac) 20 MG capsule, Take 60 mg by mouth Every Morning., Disp: , Rfl:   •  levothyroxine (SYNTHROID, LEVOTHROID) 50 MCG tablet, Take 1 tablet by mouth  Daily., Disp: 30 tablet, Rfl: 5  •  meloxicam (MOBIC) 15 MG tablet, Take 1 tablet by mouth Daily As Needed for Mild Pain ., Disp: 30 tablet, Rfl: 4  •  Multiple Vitamins-Minerals (MULTIVITAMIN WITH MINERALS) tablet tablet, Take 1 tablet by mouth Daily., Disp: , Rfl:   •  omeprazole (priLOSEC) 40 MG capsule, Take 40 mg by mouth every night at bedtime., Disp: , Rfl:   •  PARAGARD INTRAUTERINE COPPER intrauterine device IUD, 1 each by Intrauterine route 1 (One) Time., Disp: , Rfl:   •  QUEtiapine (SEROquel) 200 MG tablet, Take 200 mg by mouth Every Night., Disp: , Rfl:   •  ROZEREM 8 MG tablet, Take 8 mg by mouth Every Night., Disp: , Rfl:   •  topiramate (TOPAMAX) 200 MG tablet, Take 200 mg by mouth every night at bedtime., Disp: , Rfl:   •  saccharomyces boulardii (FLORASTOR) 250 MG capsule, Take 250 mg by mouth 2 (Two) Times a Day., Disp: , Rfl:     Allergies   Allergen Reactions   • Latex Itching   • Parabens Anaphylaxis   • Tea Tree Oil Anaphylaxis   • Zolpidem Tartrate Mental Status Change   • Benzonatate Other (See Comments)   • Trazodone Other (See Comments)   • Cefdinir Hallucinations     hallucinations       Family History   Problem Relation Age of Onset   • Hypertension Mother    • Migraines Mother    • Osteoarthritis Mother    • Hyperlipidemia Father    • Hypertension Father        Past Surgical History:   Procedure Laterality Date   • CARDIAC CATHETERIZATION N/A 7/8/2020    Procedure: Left Heart Cath with Coronary Angiography;  Surgeon: Lilibeth Langley MD;  Location:  INVASIVE LOCATION;  Service: Cardiovascular;  Laterality: N/A;   • COLONOSCOPY     • DENTAL PROCEDURE     • EAR TUBES Bilateral     x2 times   • ENDOSCOPY     • TONSILLECTOMY         Past Medical History:   Diagnosis Date   • Anxiety    • Arrhythmia    • Asthma    • Chest pain 8/14/2019   • Depression    • Disease of thyroid gland    • GERD (gastroesophageal reflux disease)    • Migraine    • Schizoaffective disorder (CMS/HCC)   "      Family History   Problem Relation Age of Onset   • Hypertension Mother    • Migraines Mother    • Osteoarthritis Mother    • Hyperlipidemia Father    • Hypertension Father        Social History     Socioeconomic History   • Marital status:      Spouse name: Not on file   • Number of children: Not on file   • Years of education: Not on file   • Highest education level: Not on file   Tobacco Use   • Smoking status: Former Smoker   • Smokeless tobacco: Never Used   Substance and Sexual Activity   • Alcohol use: Yes     Alcohol/week: 2.0 standard drinks     Types: 2 Glasses of wine per week     Frequency: Never   • Drug use: No   • Sexual activity: Defer     Birth control/protection: Implant         Procedures      Objective:       Physical Exam    /70   Pulse 94   Ht 149.9 cm (59\")   Wt 79.8 kg (176 lb)   SpO2 98%   BMI 35.55 kg/m²   The patient is alert, oriented and in no distress.    Vital signs as noted above.    Head and neck revealed no carotid bruits or jugular venous distension.  No thyromegaly or lymphadenopathy is present.    Lungs clear.  No wheezing.  Breath sounds are normal bilaterally.    Heart normal first and second heart sounds.  No murmur..  No pericardial rub is present.  No gallop is present.    Abdomen soft and nontender.  No organomegaly is present.    Extremities revealed good peripheral pulses without any pedal edema.    Skin warm and dry.    Musculoskeletal system is grossly normal.    CNS grossly normal.        "

## 2020-09-08 ENCOUNTER — TELEPHONE (OUTPATIENT)
Dept: FAMILY MEDICINE CLINIC | Facility: CLINIC | Age: 35
End: 2020-09-08

## 2020-09-08 NOTE — TELEPHONE ENCOUNTER
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  She knows to self-quarantine but wants to know if and what else she needs to be doing.  Requesting a call back.

## 2020-09-08 NOTE — TELEPHONE ENCOUNTER
I spoke with the patient.  She states that she feels fone; slight cough and sinus cingestion.  No fever.  She advised on symptomatic therapy.  She was advised to go to the ER for increasing SOB, dizziness, fever.

## 2020-09-09 ENCOUNTER — TELEPHONE (OUTPATIENT)
Dept: FAMILY MEDICINE CLINIC | Facility: CLINIC | Age: 35
End: 2020-09-09

## 2020-09-10 ENCOUNTER — APPOINTMENT (OUTPATIENT)
Dept: GENERAL RADIOLOGY | Facility: HOSPITAL | Age: 35
End: 2020-09-10

## 2020-09-10 ENCOUNTER — TELEPHONE (OUTPATIENT)
Dept: FAMILY MEDICINE CLINIC | Facility: CLINIC | Age: 35
End: 2020-09-10

## 2020-09-10 ENCOUNTER — HOSPITAL ENCOUNTER (EMERGENCY)
Facility: HOSPITAL | Age: 35
Discharge: HOME | End: 2020-09-10
Admitting: EMERGENCY MEDICINE

## 2020-09-10 VITALS
OXYGEN SATURATION: 99 % | HEIGHT: 59 IN | BODY MASS INDEX: 34.58 KG/M2 | HEART RATE: 90 BPM | TEMPERATURE: 98.2 F | WEIGHT: 171.52 LBS | DIASTOLIC BLOOD PRESSURE: 79 MMHG | RESPIRATION RATE: 16 BRPM | SYSTOLIC BLOOD PRESSURE: 132 MMHG

## 2020-09-10 DIAGNOSIS — R06.00 DYSPNEA, UNSPECIFIED TYPE: ICD-10-CM

## 2020-09-10 DIAGNOSIS — U07.1 COVID-19: Primary | ICD-10-CM

## 2020-09-10 LAB
BILIRUB UR QL STRIP: NEGATIVE
CLARITY UR: CLEAR
COLOR UR: YELLOW
GLUCOSE UR STRIP-MCNC: NEGATIVE MG/DL
HGB UR QL STRIP.AUTO: NEGATIVE
KETONES UR QL STRIP: NEGATIVE
LEUKOCYTE ESTERASE UR QL STRIP.AUTO: NEGATIVE
NITRITE UR QL STRIP: NEGATIVE
PH UR STRIP.AUTO: 7 [PH] (ref 5–8)
PROT UR QL STRIP: NEGATIVE
SP GR UR STRIP: <=1.005 (ref 1–1.03)
UROBILINOGEN UR QL STRIP: NORMAL

## 2020-09-10 PROCEDURE — 99283 EMERGENCY DEPT VISIT LOW MDM: CPT

## 2020-09-10 PROCEDURE — 93005 ELECTROCARDIOGRAM TRACING: CPT

## 2020-09-10 PROCEDURE — 71045 X-RAY EXAM CHEST 1 VIEW: CPT

## 2020-09-10 PROCEDURE — 81003 URINALYSIS AUTO W/O SCOPE: CPT | Performed by: NURSE PRACTITIONER

## 2020-09-10 NOTE — TELEPHONE ENCOUNTER
I spoke with the patient.  She tested positive for COVID on Tuesday.  She has fever, generalized malaise and cough.  I recommended that she go to the ER.

## 2020-09-10 NOTE — ED NOTES
Pt reports she tested + for covid-19 on 9/8/20 and c/o shortness of breath, sore throat, fever, loss of taste and smell, and also reports she had a syncopal episode earlier today     Dinorah Fischer RN  09/10/20 1156

## 2020-09-10 NOTE — ED PROVIDER NOTES
Subjective   Patient is a 35-year-old white female with history of schizoaffective disorder, bipolar disorder, thyroid disorder who presents today with complaints of fever chills body aches loss of taste and smell, cough congestion shortness of breath and dark urine.  Patient states she had covert swab performed 2 days ago that returned positive but was asymptomatic at that time.  She states yesterday she started to develop the symptoms.  She states she called her primary care provider today and was instructed to come to the ED for further evaluation.  Patient reports no vomiting or diarrhea.  She denies any chest pain or abdominal pain.  She denies any dysuria frequency urgency or difficulty urinating.          Review of Systems   Constitutional: Positive for chills and fever.   HENT: Positive for congestion.    Respiratory: Positive for cough and shortness of breath.    Cardiovascular: Negative for chest pain and leg swelling.   Gastrointestinal: Negative for abdominal pain, diarrhea, nausea and vomiting.   Genitourinary: Negative for decreased urine volume, difficulty urinating, dysuria, frequency and urgency.   Musculoskeletal: Negative for neck pain and neck stiffness.   Skin: Negative for rash.   Neurological: Positive for dizziness. Negative for light-headedness and headaches.       Past Medical History:   Diagnosis Date   • Anxiety    • Arrhythmia    • Asthma    • Chest pain 8/14/2019   • Depression    • Disease of thyroid gland    • GERD (gastroesophageal reflux disease)    • Migraine    • Schizoaffective disorder (CMS/HCC)        Allergies   Allergen Reactions   • Latex Itching   • Parabens Anaphylaxis   • Tea Tree Oil Anaphylaxis   • Zolpidem Tartrate Mental Status Change   • Benzonatate Other (See Comments)   • Trazodone Other (See Comments)   • Cefdinir Hallucinations     hallucinations       Past Surgical History:   Procedure Laterality Date   • CARDIAC CATHETERIZATION N/A 7/8/2020    Procedure: Left  Heart Cath with Coronary Angiography;  Surgeon: Lilibeth Langley MD;  Location: [facility] CATH INVASIVE LOCATION;  Service: Cardiovascular;  Laterality: N/A;   • COLONOSCOPY     • DENTAL PROCEDURE     • EAR TUBES Bilateral     x2 times   • ENDOSCOPY     • TONSILLECTOMY         Family History   Problem Relation Age of Onset   • Hypertension Mother    • Migraines Mother    • Osteoarthritis Mother    • Hyperlipidemia Father    • Hypertension Father        Social History     Socioeconomic History   • Marital status:      Spouse name: Not on file   • Number of children: Not on file   • Years of education: Not on file   • Highest education level: Not on file   Tobacco Use   • Smoking status: Former Smoker   • Smokeless tobacco: Never Used   Substance and Sexual Activity   • Alcohol use: Yes     Alcohol/week: 2.0 standard drinks     Types: 2 Glasses of wine per week     Frequency: Never   • Drug use: No   • Sexual activity: Defer     Birth control/protection: Implant           Objective   Physical Exam  Vital signs and triage nurse note reviewed.  Constitutional: Awake, alert; well-developed and well-nourished. No acute distress is noted.  HEENT: Normocephalic, atraumatic; pupils are PERRL with intact EOM; oropharynx is pink and moist without exudate or erythema.  No drooling or pooling of oral secretions.  Neck: Supple, full range of motion without pain; no cervical lymphadenopathy. Normal phonation.  Cardiovascular: Regular rate and rhythm, normal S1-S2.  No murmur noted.  Pulmonary: Respiratory effort regular nonlabored, breath sounds clear to auscultation all fields.  Abdomen: Soft, nontender, nondistended with normoactive bowel sounds; no rebound or guarding.  Musculoskeletal: Independent range of motion of all extremities with no palpable tenderness or edema.  Neuro: Alert oriented x3, speech is clear and appropriate, GCS 15.    Skin: Flesh tone, warm, dry, intact; no erythematous or petechial rash or  lesion.      Procedures           ED Course      Labs Reviewed   URINALYSIS W/ CULTURE IF INDICATED - Normal    Narrative:     Urine microscopic not indicated.     Xr Chest Ap    Result Date: 9/10/2020  No acute chest finding.  Electronically Signed By-Dr. Airam Mancia MD On:9/10/2020 3:19 PM This report was finalized on [id number] by Dr. Airam Mancia MD.    Medications - No data to display                                       MDM  Number of Diagnoses or Management Options  COVID-19:   Dyspnea, unspecified type:   Diagnosis management comments: Patient had chest x-ray and urinalysis obtained both of which were found to be negative for acute findings.  Patient is well-appearing, in no acute distress and with stable vital signs.  She is not hypoxic.  She is in no respiratory distress.    Diagnosis and treatment plan discussed with patient.  Patient agreeable to plan.   I discussed findings with patient who voices understanding of discharge instructions, signs and symptoms requiring return to ED; discharged improved and in stable condition with follow up for re-evaluation.         Amount and/or Complexity of Data Reviewed  Clinical lab tests: reviewed and ordered  Tests in the radiology section of CPT®: reviewed and ordered  Tests in the medicine section of CPT®: reviewed    Patient Progress  Patient progress: stable      Final diagnoses:   COVID-19   Dyspnea, unspecified type            Mae Rashid, CHAPARRO  09/10/20 7721

## 2020-09-10 NOTE — DISCHARGE INSTRUCTIONS
Continue current home medications.  Drink plenty of fluids.  May use Tylenol as needed for pain or fever.  Follow-up with your primary care provider as needed.  Return for new or worsening symptoms.

## 2020-09-26 DIAGNOSIS — S46.811A SUPRASPINATUS SPRAIN, RIGHT, INITIAL ENCOUNTER: ICD-10-CM

## 2020-09-28 RX ORDER — MELOXICAM 15 MG/1
TABLET ORAL
Qty: 30 TABLET | Refills: 1 | Status: SHIPPED | OUTPATIENT
Start: 2020-09-28 | End: 2020-12-29 | Stop reason: SDUPTHER

## 2020-09-30 ENCOUNTER — CLINICAL SUPPORT (OUTPATIENT)
Dept: FAMILY MEDICINE CLINIC | Facility: CLINIC | Age: 35
End: 2020-09-30

## 2020-09-30 DIAGNOSIS — Z23 NEED FOR PROPHYLACTIC VACCINATION AND INOCULATION AGAINST INFLUENZA: Primary | ICD-10-CM

## 2020-09-30 PROCEDURE — 90471 IMMUNIZATION ADMIN: CPT | Performed by: FAMILY MEDICINE

## 2020-09-30 PROCEDURE — 90686 IIV4 VACC NO PRSV 0.5 ML IM: CPT | Performed by: FAMILY MEDICINE

## 2020-12-02 ENCOUNTER — OFFICE (OUTPATIENT)
Dept: URBAN - METROPOLITAN AREA CLINIC 64 | Facility: CLINIC | Age: 35
End: 2020-12-02
Payer: COMMERCIAL

## 2020-12-02 VITALS
HEIGHT: 59 IN | SYSTOLIC BLOOD PRESSURE: 114 MMHG | WEIGHT: 165 LBS | HEART RATE: 76 BPM | DIASTOLIC BLOOD PRESSURE: 66 MMHG

## 2020-12-02 DIAGNOSIS — K21.9 GASTRO-ESOPHAGEAL REFLUX DISEASE WITHOUT ESOPHAGITIS: ICD-10-CM

## 2020-12-02 PROCEDURE — 99212 OFFICE O/P EST SF 10 MIN: CPT | Performed by: NURSE PRACTITIONER

## 2020-12-29 ENCOUNTER — LAB (OUTPATIENT)
Dept: LAB | Facility: HOSPITAL | Age: 35
End: 2020-12-29

## 2020-12-29 ENCOUNTER — OFFICE VISIT (OUTPATIENT)
Dept: FAMILY MEDICINE CLINIC | Facility: CLINIC | Age: 35
End: 2020-12-29

## 2020-12-29 VITALS
DIASTOLIC BLOOD PRESSURE: 74 MMHG | TEMPERATURE: 97.1 F | SYSTOLIC BLOOD PRESSURE: 111 MMHG | RESPIRATION RATE: 20 BRPM | HEART RATE: 111 BPM | OXYGEN SATURATION: 99 % | BODY MASS INDEX: 32.25 KG/M2 | HEIGHT: 59 IN | WEIGHT: 160 LBS

## 2020-12-29 DIAGNOSIS — E03.9 ACQUIRED HYPOTHYROIDISM: Primary | Chronic | ICD-10-CM

## 2020-12-29 DIAGNOSIS — S46.811A SUPRASPINATUS SPRAIN, RIGHT, INITIAL ENCOUNTER: ICD-10-CM

## 2020-12-29 DIAGNOSIS — F31.60 BIPOLAR 1 DISORDER, MIXED (HCC): Chronic | ICD-10-CM

## 2020-12-29 DIAGNOSIS — E03.9 ACQUIRED HYPOTHYROIDISM: ICD-10-CM

## 2020-12-29 DIAGNOSIS — M19.90 ARTHRITIS: ICD-10-CM

## 2020-12-29 DIAGNOSIS — Z86.69 HX OF MIGRAINE HEADACHES: ICD-10-CM

## 2020-12-29 DIAGNOSIS — K21.9 GASTROESOPHAGEAL REFLUX DISEASE WITHOUT ESOPHAGITIS: Chronic | ICD-10-CM

## 2020-12-29 PROBLEM — G89.29 HIP PAIN, CHRONIC, RIGHT: Status: RESOLVED | Noted: 2020-07-13 | Resolved: 2020-12-29

## 2020-12-29 PROBLEM — R07.9 CHEST PAIN: Status: RESOLVED | Noted: 2019-08-14 | Resolved: 2020-12-29

## 2020-12-29 PROBLEM — F32.A DEPRESSION: Chronic | Status: RESOLVED | Noted: 2017-03-28 | Resolved: 2020-12-29

## 2020-12-29 PROBLEM — R73.01 IMPAIRED FASTING GLUCOSE: Status: RESOLVED | Noted: 2017-06-28 | Resolved: 2020-12-29

## 2020-12-29 PROBLEM — M25.551 HIP PAIN, CHRONIC, RIGHT: Status: RESOLVED | Noted: 2020-07-13 | Resolved: 2020-12-29

## 2020-12-29 LAB
ALBUMIN SERPL-MCNC: 4.6 G/DL (ref 3.5–5.2)
ALBUMIN/GLOB SERPL: 1.9 G/DL
ALP SERPL-CCNC: 55 U/L (ref 39–117)
ALT SERPL W P-5'-P-CCNC: 10 U/L (ref 1–33)
ANION GAP SERPL CALCULATED.3IONS-SCNC: 9.4 MMOL/L (ref 5–15)
AST SERPL-CCNC: 16 U/L (ref 1–32)
BASOPHILS # BLD AUTO: 0.05 10*3/MM3 (ref 0–0.2)
BASOPHILS NFR BLD AUTO: 0.8 % (ref 0–1.5)
BILIRUB SERPL-MCNC: 0.7 MG/DL (ref 0–1.2)
BUN SERPL-MCNC: 17 MG/DL (ref 6–20)
BUN/CREAT SERPL: 18.9 (ref 7–25)
CALCIUM SPEC-SCNC: 9.4 MG/DL (ref 8.6–10.5)
CHLORIDE SERPL-SCNC: 112 MMOL/L (ref 98–107)
CO2 SERPL-SCNC: 19.6 MMOL/L (ref 22–29)
CREAT SERPL-MCNC: 0.9 MG/DL (ref 0.57–1)
DEPRECATED RDW RBC AUTO: 52.6 FL (ref 37–54)
EOSINOPHIL # BLD AUTO: 0.15 10*3/MM3 (ref 0–0.4)
EOSINOPHIL NFR BLD AUTO: 2.4 % (ref 0.3–6.2)
ERYTHROCYTE [DISTWIDTH] IN BLOOD BY AUTOMATED COUNT: 16.3 % (ref 12.3–15.4)
GFR SERPL CREATININE-BSD FRML MDRD: 70 ML/MIN/1.73
GLOBULIN UR ELPH-MCNC: 2.4 GM/DL
GLUCOSE SERPL-MCNC: 91 MG/DL (ref 65–99)
HCT VFR BLD AUTO: 34.8 % (ref 34–46.6)
HGB BLD-MCNC: 11.4 G/DL (ref 12–15.9)
IMM GRANULOCYTES # BLD AUTO: 0.03 10*3/MM3 (ref 0–0.05)
IMM GRANULOCYTES NFR BLD AUTO: 0.5 % (ref 0–0.5)
LYMPHOCYTES # BLD AUTO: 2.56 10*3/MM3 (ref 0.7–3.1)
LYMPHOCYTES NFR BLD AUTO: 40.7 % (ref 19.6–45.3)
MCH RBC QN AUTO: 28.7 PG (ref 26.6–33)
MCHC RBC AUTO-ENTMCNC: 32.8 G/DL (ref 31.5–35.7)
MCV RBC AUTO: 87.7 FL (ref 79–97)
MONOCYTES # BLD AUTO: 0.44 10*3/MM3 (ref 0.1–0.9)
MONOCYTES NFR BLD AUTO: 7 % (ref 5–12)
NEUTROPHILS NFR BLD AUTO: 3.06 10*3/MM3 (ref 1.7–7)
NEUTROPHILS NFR BLD AUTO: 48.6 % (ref 42.7–76)
NRBC BLD AUTO-RTO: 0 /100 WBC (ref 0–0.2)
PLATELET # BLD AUTO: 246 10*3/MM3 (ref 140–450)
PMV BLD AUTO: 12.2 FL (ref 6–12)
POTASSIUM SERPL-SCNC: 3.7 MMOL/L (ref 3.5–5.2)
PROT SERPL-MCNC: 7 G/DL (ref 6–8.5)
RBC # BLD AUTO: 3.97 10*6/MM3 (ref 3.77–5.28)
SODIUM SERPL-SCNC: 141 MMOL/L (ref 136–145)
TSH SERPL DL<=0.05 MIU/L-ACNC: 2.57 UIU/ML (ref 0.27–4.2)
WBC # BLD AUTO: 6.29 10*3/MM3 (ref 3.4–10.8)

## 2020-12-29 PROCEDURE — 99214 OFFICE O/P EST MOD 30 MIN: CPT | Performed by: FAMILY MEDICINE

## 2020-12-29 PROCEDURE — 80053 COMPREHEN METABOLIC PANEL: CPT

## 2020-12-29 PROCEDURE — 85025 COMPLETE CBC W/AUTO DIFF WBC: CPT

## 2020-12-29 PROCEDURE — 84443 ASSAY THYROID STIM HORMONE: CPT

## 2020-12-29 PROCEDURE — 36415 COLL VENOUS BLD VENIPUNCTURE: CPT

## 2020-12-29 RX ORDER — EPINEPHRINE 0.3 MG/.3ML
0.3 INJECTION SUBCUTANEOUS ONCE AS NEEDED
Qty: 1 EACH | Refills: 1 | Status: SHIPPED | OUTPATIENT
Start: 2020-12-29 | End: 2022-10-10

## 2020-12-29 RX ORDER — MELOXICAM 15 MG/1
15 TABLET ORAL DAILY PRN
Qty: 90 TABLET | Refills: 1 | Status: SHIPPED | OUTPATIENT
Start: 2020-12-29 | End: 2021-09-01

## 2020-12-29 NOTE — PROGRESS NOTES
"Subjective   Krupa Concepcion is a 35 y.o. female.     Chief Complaint   Patient presents with   • Depression     6 month followup   • Hypothyroidism   • Alopecia       HPI chief complaint: Hypothyroidism depression migraine headaches shoulder pain    Patient is a 35-year-old white female comes in for follow-up and maintenance of her current problems which include    1.  Hypothyroidism-stable-patient is currently on Synthroid 25 mg daily.  She denied heat or cold intolerance.  She denied tremor.  States however she thinks that her dose of thyroid replacement therapy is too high.  We will do a TSH.    2.  Bipolar disease-stable-apparently on Seroquel 200 mg at night p.o. around 8 mg at night.  She denies anxiousness agitation depressed or suicidal thoughts.    3.  Migraine headaches-stable-patient on topiramate 200 mg at night.  She states this helps to control her headaches.  She states that she weighed significantly more when she was placed on this dose.  She wonders whether the dose could be tapered down.    4.  Gastroesophageal reflux disease-stable-patient on proton pump inhibitor.  She denies dysphagia or heartburn.    5.  Arthritis/joint pain-stable-patient is on meloxicam.  She states this helps to control her joint pain.        The following portions of the patient's history were reviewed and updated as appropriate: allergies, current medications, past family history, past medical history, past social history, past surgical history and problem list.    Review of Systems    Objective     /74 (BP Location: Left arm, Patient Position: Sitting, Cuff Size: Large Adult)   Pulse 111   Temp 97.1 °F (36.2 °C) (Infrared)   Resp 20   Ht 149.9 cm (59\")   Wt 72.6 kg (160 lb)   SpO2 99%   BMI 32.32 kg/m²     Physical Exam  Vitals signs and nursing note reviewed.   Constitutional:       Appearance: She is well-developed. She is obese.   HENT:      Head: Normocephalic and atraumatic.      Nose: Nose normal.     "  Mouth/Throat:      Mouth: Mucous membranes are moist.      Pharynx: Oropharynx is clear.   Eyes:      Extraocular Movements: Extraocular movements intact.      Conjunctiva/sclera: Conjunctivae normal.      Pupils: Pupils are equal, round, and reactive to light.   Neck:      Musculoskeletal: Normal range of motion and neck supple.   Cardiovascular:      Rate and Rhythm: Normal rate and regular rhythm.      Pulses: Normal pulses.      Heart sounds: Normal heart sounds.   Pulmonary:      Effort: Pulmonary effort is normal.      Breath sounds: Normal breath sounds.   Abdominal:      General: Abdomen is flat. Bowel sounds are normal.      Palpations: Abdomen is soft.   Musculoskeletal: Normal range of motion.   Skin:     General: Skin is warm and dry.   Neurological:      Mental Status: She is alert and oriented to person, place, and time.   Psychiatric:         Behavior: Behavior normal.         Thought Content: Thought content normal.         Judgment: Judgment normal.         Lipid Panel (07/06/2020 11:20)  Protime-INR (07/06/2020 11:20)  Basic Metabolic Panel (07/06/2020 11:20)  CBC (No Diff) (07/06/2020 11:20)    Assessment/Plan   Diagnoses and all orders for this visit:    1. Acquired hypothyroidism (Primary)  -     CBC & Differential; Future  -     Comprehensive Metabolic Panel; Future  -     TSH; Future    2. Bipolar 1 disorder, mixed (CMS/HCC)    3. Gastroesophageal reflux disease without esophagitis    4. Hx of migraine headaches    5. Arthritis    Other orders  -     Discontinue: topiramate (TOPAMAX) 200 MG tablet; Take 1 tablet by mouth every night at bedtime.  Dispense: 90 tablet; Refill: 1  -     topiramate (TOPAMAX) 200 MG tablet; Take 1 tablet by mouth every night at bedtime.  Dispense: 90 tablet; Refill: 1  -     EPINEPHrine (EPIPEN) 0.3 MG/0.3ML solution auto-injector injection; Inject 0.3 mL under the skin into the appropriate area as directed 1 (One) Time As Needed (anaphylaxis).  Dispense: 1 each;  Refill: 1      Patient Instructions   Continue your current medications and treatment.    Have the follow up labs done and call for results.    Follow up in the office in 6 months.      Erlin Allred Jr., MD    12/29/20

## 2020-12-30 RX ORDER — LEVOTHYROXINE SODIUM 0.05 MG/1
50 TABLET ORAL DAILY
Qty: 30 TABLET | Refills: 5 | Status: SHIPPED | OUTPATIENT
Start: 2020-12-30 | End: 2021-01-25

## 2021-01-25 RX ORDER — LEVOTHYROXINE SODIUM 0.05 MG/1
TABLET ORAL
Qty: 30 TABLET | Refills: 5 | Status: SHIPPED | OUTPATIENT
Start: 2021-01-25 | End: 2021-10-25 | Stop reason: SDUPTHER

## 2021-04-08 ENCOUNTER — TELEPHONE (OUTPATIENT)
Dept: FAMILY MEDICINE CLINIC | Facility: CLINIC | Age: 36
End: 2021-04-08

## 2021-04-08 NOTE — TELEPHONE ENCOUNTER
Caller: Krupa Concepcion    Relationship: Self    Best call back number: [phone]    What is the best time to reach you: ANYTIME    Who are you requesting to speak with (clinical staff, provider,  specific staff member): NURSE    Do you know the name of the person who called: KRUPA    What was the call regarding: PATIENT WOULD LIKE TO SEE IF DR. FRANCISCO CAN TAKE OVER THE PRESCRIPTION FOR SEROQUEL 200 MG (GENERIC) SHE IS HAVING TROUBLE GETTING TO THE THERAPY SESSIONS TO GET IT FROM HER CURRENT PROVIDER BECAUSE SHE HAS A NEW JOB.    Do you require a callback: YES

## 2021-04-12 ENCOUNTER — OFFICE VISIT (OUTPATIENT)
Dept: FAMILY MEDICINE CLINIC | Facility: CLINIC | Age: 36
End: 2021-04-12

## 2021-04-12 VITALS
BODY MASS INDEX: 29.68 KG/M2 | RESPIRATION RATE: 16 BRPM | DIASTOLIC BLOOD PRESSURE: 78 MMHG | TEMPERATURE: 97.1 F | HEIGHT: 59 IN | WEIGHT: 147.2 LBS | OXYGEN SATURATION: 99 % | HEART RATE: 112 BPM | SYSTOLIC BLOOD PRESSURE: 110 MMHG

## 2021-04-12 DIAGNOSIS — Z86.69 HX OF MIGRAINE HEADACHES: Chronic | ICD-10-CM

## 2021-04-12 DIAGNOSIS — J30.9 ALLERGIC RHINITIS, UNSPECIFIED SEASONALITY, UNSPECIFIED TRIGGER: Primary | Chronic | ICD-10-CM

## 2021-04-12 DIAGNOSIS — K21.9 GASTROESOPHAGEAL REFLUX DISEASE WITHOUT ESOPHAGITIS: Chronic | ICD-10-CM

## 2021-04-12 DIAGNOSIS — F31.60 BIPOLAR 1 DISORDER, MIXED (HCC): Chronic | ICD-10-CM

## 2021-04-12 DIAGNOSIS — E03.9 ACQUIRED HYPOTHYROIDISM: Chronic | ICD-10-CM

## 2021-04-12 PROCEDURE — 99214 OFFICE O/P EST MOD 30 MIN: CPT | Performed by: FAMILY MEDICINE

## 2021-04-12 NOTE — PROGRESS NOTES
"Anju Concepcion is a 36 y.o. female.     Chief Complaint   Patient presents with   • Ear Fullness   • Hypothyroidism   • Manic Behavior       HPI  Chief complaint: Bipolar disease allergic rhinitis migraine headaches-gastroesophageal reflux disease    Patient is 36-year-old white female comes in for follow-up and maintenance of her current problems which include    1.  Allergic rhinitis-stable-patient currently takes Zyrtec 10 mg once a day.  She denies sinus congestion drainage cough shortness of breath or wheezing.    2.  Hypothyroidism-stable-patient on Synthroid 25 mg daily.  She denied heat or cold intolerance tremor or weight gain or weight loss.    3.  Bipolar disease type I-stable-patient on Seroquel 200 mg daily.  Patient denied anxiousness agitation depressed or suicidal thoughts.  She patient denied manic behavior.  She is sleeping well.  Patient also takes Razadyne 8 mg needed.  Patient states that as result of a job change she has been unable to meet with her therapist or psychiatrist on a regular basis.  She requested that I prescribe her Seroquel.  I agreed to do so.    4.  Migraine headaches-stable-the patient on topiramate 200 mg at night.  States this helps to control the severity and frequency of her headaches.    5. gastroesophageal reflux disease-stable-the patient is on proton pump inhibitor as needed.  She denies dysphagia or heartburn.      The following portions of the patient's history were reviewed and updated as appropriate: allergies, current medications, past family history, past medical history, past social history, past surgical history and problem list.    Review of Systems    Objective     /78 (BP Location: Left arm, Patient Position: Sitting, Cuff Size: Adult)   Pulse 112   Temp 97.1 °F (36.2 °C) (Infrared)   Resp 16   Ht 149.9 cm (59\")   Wt 66.8 kg (147 lb 3.2 oz)   SpO2 99%   BMI 29.73 kg/m²     Physical Exam  Vitals and nursing note reviewed. "   Constitutional:       Appearance: She is well-developed.   HENT:      Head: Normocephalic and atraumatic.      Nose: Nose normal.      Mouth/Throat:      Mouth: Mucous membranes are moist.      Pharynx: Oropharynx is clear.   Eyes:      Extraocular Movements: Extraocular movements intact.      Conjunctiva/sclera: Conjunctivae normal.      Pupils: Pupils are equal, round, and reactive to light.   Cardiovascular:      Rate and Rhythm: Normal rate and regular rhythm.      Pulses: Normal pulses.      Heart sounds: Normal heart sounds.   Pulmonary:      Effort: Pulmonary effort is normal.      Breath sounds: Normal breath sounds.   Abdominal:      General: Abdomen is flat. Bowel sounds are normal.      Palpations: Abdomen is soft.   Musculoskeletal:         General: Normal range of motion.      Cervical back: Normal range of motion and neck supple.   Skin:     General: Skin is warm and dry.   Neurological:      Mental Status: She is alert and oriented to person, place, and time.   Psychiatric:         Behavior: Behavior normal.         Thought Content: Thought content normal.         Judgment: Judgment normal.         TSH (12/29/2020 08:43)  Comprehensive Metabolic Panel (12/29/2020 08:43)  CBC & Differential (12/29/2020 08:43)    Assessment/Plan   Diagnoses and all orders for this visit:    1. Allergic rhinitis, unspecified seasonality, unspecified trigger (Primary)    2. Acquired hypothyroidism    3. Gastroesophageal reflux disease without esophagitis    4. Bipolar 1 disorder, mixed (CMS/HCC)    5. Hx of migraine headaches      Patient Instructions   Continue your current medications and treatment.    Follow up in the office in 6 months.    Laboratory testing at that time.      Erlin Allred Jr., MD    04/12/21

## 2021-05-13 ENCOUNTER — HOSPITAL ENCOUNTER (EMERGENCY)
Facility: HOSPITAL | Age: 36
Discharge: HOME | End: 2021-05-13
Attending: EMERGENCY MEDICINE | Admitting: EMERGENCY MEDICINE

## 2021-05-13 ENCOUNTER — TELEPHONE (OUTPATIENT)
Dept: FAMILY MEDICINE CLINIC | Facility: CLINIC | Age: 36
End: 2021-05-13

## 2021-05-13 VITALS
TEMPERATURE: 98 F | HEIGHT: 59 IN | SYSTOLIC BLOOD PRESSURE: 115 MMHG | RESPIRATION RATE: 14 BRPM | DIASTOLIC BLOOD PRESSURE: 75 MMHG | OXYGEN SATURATION: 100 % | HEART RATE: 78 BPM | BODY MASS INDEX: 28.89 KG/M2 | WEIGHT: 143.3 LBS

## 2021-05-13 DIAGNOSIS — H57.11 PAIN OF RIGHT EYE: ICD-10-CM

## 2021-05-13 DIAGNOSIS — H53.2 DIPLOPIA: Primary | ICD-10-CM

## 2021-05-13 PROCEDURE — 99282 EMERGENCY DEPT VISIT SF MDM: CPT

## 2021-05-13 RX ORDER — PROPARACAINE HYDROCHLORIDE 5 MG/ML
SOLUTION/ DROPS OPHTHALMIC
Status: DISCONTINUED
Start: 2021-05-13 | End: 2021-05-13 | Stop reason: WASHOUT
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
## 2021-05-14 NOTE — TELEPHONE ENCOUNTER
Pt given note from ED but pt states she is required to also have a note from her PCP. Pt at Dr. Saldivar's office now.

## 2021-07-02 ENCOUNTER — TELEPHONE (OUTPATIENT)
Dept: FAMILY MEDICINE CLINIC | Facility: CLINIC | Age: 36
End: 2021-07-02

## 2021-07-02 NOTE — TELEPHONE ENCOUNTER
Caller: Krupa Concepcion    Relationship to patient: Self    Best call back number: [phone] (H), OK TO LEAVE VM    Chief complaint: LOSING 2-3 POUNDS PER WEEK, IS CURRENTLY  POUNDS AND HAVING TROUBLE REGULATING HER BODY TEMPERATURE. EATING THREE MEALS A DAY AND IS EATING SNACKS IN BETWEEN THE MEALS     Type of visit: OFFICE VISIT    Requested date: 08/31/2021 AT 1:30PM     If rescheduling, when is the original appointment: N/A     Additional notes: PATIENT IS ASKING IF SHE CAN BE WORKED IN AT 1:30PM ON 08/30/2021 IF AT ALL POSSIBLE TO WORK WITH HER CURRENT WORK SCHEDULE, PLEASE ADVISE.

## 2021-07-02 NOTE — TELEPHONE ENCOUNTER
Called the patient and offered to schedule an appt before August due to the chief complaint and because Dr. Allred will be out of the office half a day on 8/30/21 & 8/31/21 so the time she's requesting is not available. Advised patient if she is not intentionally dieting/exercising or doing anything to lose weight and is losing this much weight, she needs to be evaluated. The patient then agreed to schedule but then could not agree on a date/time available. The patient requested an appt at 4pm or later. Advised patient our office closes at 4pm and we do not see any patients after that time. Advised patient the last appt of the day is at 3:45. Advised patient the 3:30 and 3:45 appointments typically book out several weeks. Tried to find an appt time to accommodate the patient. Nothing I offered worked for her. She states she cannot miss work to come to the doctor. Advised patient if the weight loss continues and she feels the symptoms are worsening, to go to urgent care or the ED to be evaluated since none of our appt times work for her. She said thank you very much for your time in a very sarcastic tone and hung up.

## 2021-07-31 DIAGNOSIS — Z87.892 PERSONAL HISTORY OF ANAPHYLAXIS: Primary | ICD-10-CM

## 2021-07-31 DIAGNOSIS — R63.4 WEIGHT LOSS: ICD-10-CM

## 2021-07-31 DIAGNOSIS — E03.9 ACQUIRED HYPOTHYROIDISM: ICD-10-CM

## 2021-08-03 ENCOUNTER — LAB (OUTPATIENT)
Dept: LAB | Facility: HOSPITAL | Age: 36
End: 2021-08-03

## 2021-08-03 DIAGNOSIS — Z87.892 PERSONAL HISTORY OF ANAPHYLAXIS: ICD-10-CM

## 2021-08-03 DIAGNOSIS — R63.4 WEIGHT LOSS: ICD-10-CM

## 2021-08-03 LAB
ALBUMIN SERPL-MCNC: 4.7 G/DL (ref 3.5–5.2)
ALBUMIN/GLOB SERPL: 2.1 G/DL
ALP SERPL-CCNC: 57 U/L (ref 39–117)
ALT SERPL W P-5'-P-CCNC: 12 U/L (ref 1–33)
ANION GAP SERPL CALCULATED.3IONS-SCNC: 9.8 MMOL/L (ref 5–15)
AST SERPL-CCNC: 13 U/L (ref 1–32)
BASOPHILS # BLD AUTO: 0.06 10*3/MM3 (ref 0–0.2)
BASOPHILS NFR BLD AUTO: 0.8 % (ref 0–1.5)
BILIRUB SERPL-MCNC: 0.8 MG/DL (ref 0–1.2)
BILIRUB UR QL STRIP: NEGATIVE
BUN SERPL-MCNC: 10 MG/DL (ref 6–20)
BUN/CREAT SERPL: 12.2 (ref 7–25)
CALCIUM SPEC-SCNC: 9 MG/DL (ref 8.6–10.5)
CHLORIDE SERPL-SCNC: 110 MMOL/L (ref 98–107)
CLARITY UR: CLEAR
CO2 SERPL-SCNC: 20.2 MMOL/L (ref 22–29)
COLOR UR: YELLOW
CREAT SERPL-MCNC: 0.82 MG/DL (ref 0.57–1)
DEPRECATED RDW RBC AUTO: 56.7 FL (ref 37–54)
EOSINOPHIL # BLD AUTO: 0.08 10*3/MM3 (ref 0–0.4)
EOSINOPHIL NFR BLD AUTO: 1.1 % (ref 0.3–6.2)
ERYTHROCYTE [DISTWIDTH] IN BLOOD BY AUTOMATED COUNT: 16.5 % (ref 12.3–15.4)
GFR SERPL CREATININE-BSD FRML MDRD: 78 ML/MIN/1.73
GLOBULIN UR ELPH-MCNC: 2.2 GM/DL
GLUCOSE SERPL-MCNC: 87 MG/DL (ref 65–99)
GLUCOSE UR STRIP-MCNC: NEGATIVE MG/DL
HCT VFR BLD AUTO: 37.9 % (ref 34–46.6)
HGB BLD-MCNC: 11.9 G/DL (ref 12–15.9)
HGB UR QL STRIP.AUTO: NEGATIVE
IMM GRANULOCYTES # BLD AUTO: 0.04 10*3/MM3 (ref 0–0.05)
IMM GRANULOCYTES NFR BLD AUTO: 0.5 % (ref 0–0.5)
KETONES UR QL STRIP: NEGATIVE
LEUKOCYTE ESTERASE UR QL STRIP.AUTO: NEGATIVE
LYMPHOCYTES # BLD AUTO: 2.3 10*3/MM3 (ref 0.7–3.1)
LYMPHOCYTES NFR BLD AUTO: 31.1 % (ref 19.6–45.3)
MCH RBC QN AUTO: 29.2 PG (ref 26.6–33)
MCHC RBC AUTO-ENTMCNC: 31.4 G/DL (ref 31.5–35.7)
MCV RBC AUTO: 93.1 FL (ref 79–97)
MONOCYTES # BLD AUTO: 0.52 10*3/MM3 (ref 0.1–0.9)
MONOCYTES NFR BLD AUTO: 7 % (ref 5–12)
NEUTROPHILS NFR BLD AUTO: 4.4 10*3/MM3 (ref 1.7–7)
NEUTROPHILS NFR BLD AUTO: 59.5 % (ref 42.7–76)
NITRITE UR QL STRIP: NEGATIVE
NRBC BLD AUTO-RTO: 0 /100 WBC (ref 0–0.2)
PH UR STRIP.AUTO: 7.5 [PH] (ref 5–8)
PLATELET # BLD AUTO: 271 10*3/MM3 (ref 140–450)
PMV BLD AUTO: 11.9 FL (ref 6–12)
POTASSIUM SERPL-SCNC: 3.8 MMOL/L (ref 3.5–5.2)
PROT SERPL-MCNC: 6.9 G/DL (ref 6–8.5)
PROT UR QL STRIP: NEGATIVE
RBC # BLD AUTO: 4.07 10*6/MM3 (ref 3.77–5.28)
SODIUM SERPL-SCNC: 140 MMOL/L (ref 136–145)
SP GR UR STRIP: <1.005 (ref 1–1.03)
T3FREE SERPL-MCNC: 2.7 PG/ML (ref 2–4.4)
T4 FREE SERPL-MCNC: 1.13 NG/DL (ref 0.93–1.7)
TSH SERPL DL<=0.05 MIU/L-ACNC: 1.51 UIU/ML (ref 0.27–4.2)
UROBILINOGEN UR QL STRIP: ABNORMAL
WBC # BLD AUTO: 7.4 10*3/MM3 (ref 3.4–10.8)

## 2021-08-03 PROCEDURE — 84481 FREE ASSAY (FT-3): CPT

## 2021-08-03 PROCEDURE — 80050 GENERAL HEALTH PANEL: CPT

## 2021-08-03 PROCEDURE — 81003 URINALYSIS AUTO W/O SCOPE: CPT

## 2021-08-03 PROCEDURE — 84439 ASSAY OF FREE THYROXINE: CPT

## 2021-08-03 PROCEDURE — 36415 COLL VENOUS BLD VENIPUNCTURE: CPT

## 2021-08-05 DIAGNOSIS — T14.8XXA BRUISING: Primary | ICD-10-CM

## 2021-08-06 ENCOUNTER — TELEPHONE (OUTPATIENT)
Dept: FAMILY MEDICINE CLINIC | Facility: CLINIC | Age: 36
End: 2021-08-06

## 2021-08-06 NOTE — TELEPHONE ENCOUNTER
----- Message from Erlin Allred Jr., MD sent at 8/5/2021  4:48 PM EDT -----  Regarding: FW: Reply to message  Contact: [phone]  Maite Pisano,  Will you schedule Ms Concepcion to see me on the day she requests.  Thanks, [facility]  ----- Message -----  From: Krupa Concepcion  Sent: 8/5/2021   4:45 PM EDT  To: [facility]  Subject: RE: Reply to message                             Dr. Allred,    I have a 1/2 of a vacation day on August 30th. In the afternoon. 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 Is there anyway you could possibly fit me in?    I will try to get the tests in as soon as possible. We are on mandatory overtime and I have to work Saturday.    Thanks for your Help.    Krupa  ----- Message -----  From: Erlin Allred Jr., MD  Sent: 8/5/21 3:56 PM  To: Krupa Cuevasder  Subject: RE: Reply to message    Norris, Ms Funes,  I agree.  I ordered some additional labs because of the bruising.  They can be done at your convenience.  The night sweats need evaluation; preferably in the office.  I can see you in the office for this; or since you are still new at your job you could go to Urgent care.  Dr Allred      ----- Message -----       From:Krupa HERNANDEZ Jamey       Sent:8/5/2021  3:46 PM EDT         To:Erlin Allred Jr., MD    Subject:Reply to message      Dr Allred,    I have significant problems with bruising.  I bruise at a drop of a hat.  And they last for days.  I also have trouble controlling my temperature.  I'm not doing anything to warrant such bruises.  And with the air conditioning on 60 degrees I shouldn't be having night sweats.    Krupa Concepcion   [phone]       ----- Message -----       From:Erlin Allred Jr., MD       Sent:8/5/2021  8:30 AM EDT         To:Krupa Concepcion    Subject:RE: Test Results Question    Norris Ms Concepcion,  Your test are back.  Your thyroid function is normal.  Your tests do not suggest a reason for the weight loss.  Other test that could be done  at this time would be CT of the chest, abdomen and pelvis.  The weight loss may be do to your change in lifestyle.  Dr Allred      ----- Message -----       From:Krupa Concepcion       Sent:8/4/2021  2:50 PM EDT         To:Erlin Allred Jr., MD    Subject:Test Results Question    Can you please call me regarding my test results after 4:00 pm? I get off the line at [employer] then and will be able to talk.    Thank you,      Krupa Concepcion   [phone]

## 2021-08-06 NOTE — TELEPHONE ENCOUNTER
Nixon to read; Tried to call Krupa but her phone is not in service at this time. Dr. Allred will be out of the office in the afternoon of the 30th. This is the time she requested an appointment for.

## 2021-08-09 RX ORDER — QUETIAPINE FUMARATE 200 MG/1
200 TABLET, FILM COATED ORAL NIGHTLY
Qty: 90 TABLET | Refills: 1 | Status: SHIPPED | OUTPATIENT
Start: 2021-08-09 | End: 2021-10-15

## 2021-08-11 ENCOUNTER — LAB (OUTPATIENT)
Dept: LAB | Facility: HOSPITAL | Age: 36
End: 2021-08-11

## 2021-08-11 DIAGNOSIS — T14.8XXA BRUISING: ICD-10-CM

## 2021-08-11 DIAGNOSIS — D69.1 PLATELET FUNCTION DEFECT (HCC): Primary | ICD-10-CM

## 2021-08-11 LAB
APTT PPP: 22.8 SECONDS (ref 24–31)
CLOSURE TME COLL+ADP BLD: 109 SECONDS (ref 45–97)
CLOSURE TME COLL+EPINEP BLD: 196 SECONDS (ref 64–160)
INR PPP: 0.96 (ref 0.93–1.1)
PROTHROMBIN TIME: 10.7 SECONDS (ref 9.6–11.7)

## 2021-08-11 PROCEDURE — 36415 COLL VENOUS BLD VENIPUNCTURE: CPT

## 2021-08-11 PROCEDURE — 85576 BLOOD PLATELET AGGREGATION: CPT

## 2021-08-11 PROCEDURE — 85730 THROMBOPLASTIN TIME PARTIAL: CPT

## 2021-08-11 PROCEDURE — 85610 PROTHROMBIN TIME: CPT

## 2021-08-13 ENCOUNTER — TELEPHONE (OUTPATIENT)
Dept: ONCOLOGY | Facility: OTHER | Age: 36
End: 2021-08-13

## 2021-08-13 ENCOUNTER — HOSPITAL ENCOUNTER (EMERGENCY)
Facility: HOSPITAL | Age: 36
Discharge: HOME | End: 2021-08-13
Attending: EMERGENCY MEDICINE | Admitting: EMERGENCY MEDICINE

## 2021-08-13 VITALS
BODY MASS INDEX: 28.83 KG/M2 | RESPIRATION RATE: 18 BRPM | OXYGEN SATURATION: 99 % | TEMPERATURE: 98.4 F | DIASTOLIC BLOOD PRESSURE: 66 MMHG | HEART RATE: 75 BPM | HEIGHT: 59 IN | WEIGHT: 143 LBS | SYSTOLIC BLOOD PRESSURE: 118 MMHG

## 2021-08-13 DIAGNOSIS — R55 SYNCOPE AND COLLAPSE: Primary | ICD-10-CM

## 2021-08-13 LAB
ALBUMIN SERPL-MCNC: 4.4 G/DL (ref 3.5–5.2)
ALBUMIN/GLOB SERPL: 2.1 G/DL
ALP SERPL-CCNC: 53 U/L (ref 39–117)
ALT SERPL W P-5'-P-CCNC: 14 U/L (ref 1–33)
AMPHET+METHAMPHET UR QL: NEGATIVE
ANION GAP SERPL CALCULATED.3IONS-SCNC: 8.9 MMOL/L (ref 5–15)
AST SERPL-CCNC: 19 U/L (ref 1–32)
BARBITURATES UR QL SCN: NEGATIVE
BASOPHILS # BLD AUTO: 0.05 10*3/MM3 (ref 0–0.2)
BASOPHILS NFR BLD AUTO: 0.6 % (ref 0–1.5)
BENZODIAZ UR QL SCN: NEGATIVE
BILIRUB SERPL-MCNC: 0.5 MG/DL (ref 0–1.2)
BILIRUB UR QL STRIP: NEGATIVE
BUN SERPL-MCNC: 11 MG/DL (ref 6–20)
BUN/CREAT SERPL: 14.3 (ref 7–25)
CALCIUM SPEC-SCNC: 8.1 MG/DL (ref 8.6–10.5)
CANNABINOIDS SERPL QL: NEGATIVE
CHLORIDE SERPL-SCNC: 115 MMOL/L (ref 98–107)
CLARITY UR: CLEAR
CO2 SERPL-SCNC: 17.1 MMOL/L (ref 22–29)
COCAINE UR QL: NEGATIVE
COLOR UR: YELLOW
CREAT SERPL-MCNC: 0.77 MG/DL (ref 0.57–1)
DEPRECATED RDW RBC AUTO: 53.5 FL (ref 37–54)
EOSINOPHIL # BLD AUTO: 0.11 10*3/MM3 (ref 0–0.4)
EOSINOPHIL NFR BLD AUTO: 1.4 % (ref 0.3–6.2)
ERYTHROCYTE [DISTWIDTH] IN BLOOD BY AUTOMATED COUNT: 16.6 % (ref 12.3–15.4)
GFR SERPL CREATININE-BSD FRML MDRD: 84 ML/MIN/1.73
GLOBULIN UR ELPH-MCNC: 2.1 GM/DL
GLUCOSE SERPL-MCNC: 86 MG/DL (ref 65–99)
GLUCOSE UR STRIP-MCNC: NEGATIVE MG/DL
HCG SERPL QL: NEGATIVE
HCT VFR BLD AUTO: 33 % (ref 34–46.6)
HGB BLD-MCNC: 11.3 G/DL (ref 12–15.9)
HGB UR QL STRIP.AUTO: NEGATIVE
IMM GRANULOCYTES # BLD AUTO: 0.02 10*3/MM3 (ref 0–0.05)
IMM GRANULOCYTES NFR BLD AUTO: 0.3 % (ref 0–0.5)
KETONES UR QL STRIP: NEGATIVE
LEUKOCYTE ESTERASE UR QL STRIP.AUTO: NEGATIVE
LYMPHOCYTES # BLD AUTO: 2.18 10*3/MM3 (ref 0.7–3.1)
LYMPHOCYTES NFR BLD AUTO: 28 % (ref 19.6–45.3)
MCH RBC QN AUTO: 30.6 PG (ref 26.6–33)
MCHC RBC AUTO-ENTMCNC: 34.2 G/DL (ref 31.5–35.7)
MCV RBC AUTO: 89.4 FL (ref 79–97)
METHADONE UR QL SCN: NEGATIVE
MONOCYTES # BLD AUTO: 0.42 10*3/MM3 (ref 0.1–0.9)
MONOCYTES NFR BLD AUTO: 5.4 % (ref 5–12)
NEUTROPHILS NFR BLD AUTO: 5 10*3/MM3 (ref 1.7–7)
NEUTROPHILS NFR BLD AUTO: 64.3 % (ref 42.7–76)
NITRITE UR QL STRIP: NEGATIVE
NRBC BLD AUTO-RTO: 0 /100 WBC (ref 0–0.2)
NT-PROBNP SERPL-MCNC: 39.8 PG/ML (ref 0–450)
OPIATES UR QL: NEGATIVE
OXYCODONE UR QL SCN: NEGATIVE
PH UR STRIP.AUTO: 6.5 [PH] (ref 5–8)
PLATELET # BLD AUTO: 260 10*3/MM3 (ref 140–450)
PMV BLD AUTO: 11.2 FL (ref 6–12)
POTASSIUM SERPL-SCNC: 3.7 MMOL/L (ref 3.5–5.2)
PROT SERPL-MCNC: 6.5 G/DL (ref 6–8.5)
PROT UR QL STRIP: NEGATIVE
RBC # BLD AUTO: 3.69 10*6/MM3 (ref 3.77–5.28)
SODIUM SERPL-SCNC: 141 MMOL/L (ref 136–145)
SP GR UR STRIP: 1.01 (ref 1–1.03)
T4 FREE SERPL-MCNC: 1.11 NG/DL (ref 0.93–1.7)
TROPONIN T SERPL-MCNC: <0.01 NG/ML (ref 0–0.03)
TSH SERPL DL<=0.05 MIU/L-ACNC: 1.29 UIU/ML (ref 0.27–4.2)
UROBILINOGEN UR QL STRIP: NORMAL
WBC # BLD AUTO: 7.78 10*3/MM3 (ref 3.4–10.8)

## 2021-08-13 PROCEDURE — 84484 ASSAY OF TROPONIN QUANT: CPT | Performed by: EMERGENCY MEDICINE

## 2021-08-13 PROCEDURE — 83880 ASSAY OF NATRIURETIC PEPTIDE: CPT | Performed by: EMERGENCY MEDICINE

## 2021-08-13 PROCEDURE — 80307 DRUG TEST PRSMV CHEM ANLYZR: CPT | Performed by: EMERGENCY MEDICINE

## 2021-08-13 PROCEDURE — 84703 CHORIONIC GONADOTROPIN ASSAY: CPT | Performed by: EMERGENCY MEDICINE

## 2021-08-13 PROCEDURE — 99283 EMERGENCY DEPT VISIT LOW MDM: CPT

## 2021-08-13 PROCEDURE — 80050 GENERAL HEALTH PANEL: CPT | Performed by: EMERGENCY MEDICINE

## 2021-08-13 PROCEDURE — 93010 ELECTROCARDIOGRAM REPORT: CPT | Performed by: INTERNAL MEDICINE

## 2021-08-13 PROCEDURE — 93005 ELECTROCARDIOGRAM TRACING: CPT | Performed by: EMERGENCY MEDICINE

## 2021-08-13 PROCEDURE — 84439 ASSAY OF FREE THYROXINE: CPT | Performed by: EMERGENCY MEDICINE

## 2021-08-13 PROCEDURE — 81003 URINALYSIS AUTO W/O SCOPE: CPT | Performed by: EMERGENCY MEDICINE

## 2021-08-13 RX ORDER — SODIUM CHLORIDE 0.9 % (FLUSH) 0.9 %
10 SYRINGE (ML) INJECTION AS NEEDED
Status: DISCONTINUED | OUTPATIENT
Start: 2021-08-13 | End: 2021-08-13 | Stop reason: HOSPADM

## 2021-08-13 NOTE — TELEPHONE ENCOUNTER
PATIENT CALLED REQUESTING TO SPEAK WITH RON.  DID NOT SEE A NOTE IN CHART. TRIED TO W/T-LEFT VM FOR RON TO CALL PATIENT BACK AFTER 4:00 P.M.  PER PATIENT, RON CAN LEAVE A VM IF NO ANSWER.

## 2021-08-13 NOTE — ED NOTES
Patient was placed in face mask in first look.  Patient was wearing a face mask throughout our encounter.  I wore protective eye protection throughout the encounter.  Hand hygiene was performed before and after patient encounter.     Pt had a syncopal episode while working at [employer], pt was assisted down, no fall.  Pt c/o headache and light sensitivity.     Braden Neil RN  08/13/21 4897

## 2021-08-13 NOTE — ED PROVIDER NOTES
EMERGENCY DEPARTMENT ENCOUNTER    Room Number:  13/13  Date seen:  8/13/2021  PCP: Erlin Allred Jr., MD  Historian: Patient      HPI:  Chief Complaint: Syncope  A complete HPI/ROS/PMH/PSH/SH/FH are unobtainable due to: Nothing  Context: Krupa Concepcion is a 36 y.o. female who presents to the ED c/o syncopal episode that occurred at work today.  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  She reports that where she was working was very hot.  She began to feel nauseated and lightheaded when she went to the medical unit there.  She recalls that they had evaluated her and thought that she should go to the emergency department not emergently.  The next thing she recalls she was getting helped up from the floor.  She had complained of mild headache in route, 1 out of 10 severity.  She denies nausea or vomiting.  She reports that she has been having heavy menstrual bleeding.  She does have a history of irregular menstrual periods.  She reports that about 3 weeks ago she had a period lasting 2 weeks that stopped for 1 week and then yesterday she began having menstrual cramping and bleeding again.  She denies being pregnant.  Patient reports multiple medical problems.  She reports that she has a history of hypothyroidism for which she is on levothyroxine.  She has been having unintentional weight loss recently, proximately 3 to 5 pounds each week.  She has seen her doctor about this and was told that her thyroid function tests were within normal limits but they are doing further evaluation of the symptoms as outpatient.  She was also told that she may have a clotting disorder and has been referred to hematology.  Patient also admits that her mother has M[place]hausen by proxy and she struggled with this for years.  She recently was able to get away from her mother.            PAST MEDICAL HISTORY  Active Ambulatory Problems     Diagnosis Date Noted   • Bipolar 1 disorder, mixed (CMS/[facility]) 03/28/2017   • Gastroesophageal reflux  disease 2017   • Hypothyroidism 2017   • Obesity 2017   • Allergic rhinitis 2019   • Vitamin D deficiency 2017   • Personal history of anaphylaxis 2017   • Hx of migraine headaches 2020   • Arthritis 2020     Resolved Ambulatory Problems     Diagnosis Date Noted   • Chest pain 2019   • Depression 2017   • Impaired fasting glucose 2017   • Bronchitis 2020   • Chest discomfort 2020   • Abnormal nuclear stress test 2020   • Hip pain, chronic, right 2020     Past Medical History:   Diagnosis Date   • Anxiety    • Arrhythmia    • Asthma    • Disease of thyroid gland    • GERD (gastroesophageal reflux disease)    • Migraine    • Schizoaffective disorder (CMS/HCC)          PAST SURGICAL HISTORY  Past Surgical History:   Procedure Laterality Date   • CARDIAC CATHETERIZATION N/A 2020    Procedure: Left Heart Cath with Coronary Angiography;  Surgeon: Lilibeth Langley MD;  [facility];  Service: Cardiovascular;  Laterality: N/A;   • COLONOSCOPY     • DENTAL PROCEDURE     • EAR TUBES Bilateral     x2 times   • ENDOSCOPY     • TONSILLECTOMY           FAMILY HISTORY  Family History   Problem Relation Age of Onset   • Hypertension Mother    • Migraines Mother    • Osteoarthritis Mother    • Hyperlipidemia Father    • Hypertension Father          SOCIAL HISTORY  Social History     Socioeconomic History   • Marital status: Significant Other     Spouse name: Not on file   • Number of children: Not on file   • Years of education: Not on file   • Highest education level: Not on file   Tobacco Use   • Smoking status: Former Smoker     Packs/day: 0.25     Years: 0.50     Pack years: 0.12     Types: Cigarettes     Quit date: 2000     Years since quittin.6   • Smokeless tobacco: Never Used   Vaping Use   • Vaping Use: Never used   Substance and Sexual Activity   • Alcohol use: Yes     Alcohol/week: 2.0 standard  drinks     Types: 2 Glasses of wine per week   • Drug use: No   • Sexual activity: Defer     Birth control/protection: Implant         ALLERGIES  Latex, Parabens, Tea tree oil, Zolpidem tartrate, Benzonatate, Trazodone, and Cefdinir        REVIEW OF SYSTEMS  Review of Systems   Review of all 14 systems is negative other than stated in the HPI above.      PHYSICAL EXAM  ED Triage Vitals [08/13/21 0856]   Temp Heart Rate Resp BP SpO2   98.4 °F (36.9 °C) 60 16 112/75 100 %      Temp src Heart Rate Source Patient Position BP Location FiO2 (%)   Tympanic Monitor Sitting Left arm --         GENERAL: Awake and alert, no acute distress  HENT: nares patent, no scalp hematoma.  Mucous membranes are normal-appearing.  Gingiva normal appearing.  EYES: no scleral icterus, pupils 3 mm and reactive bilaterally  CV: regular rhythm, normal rate  RESPIRATORY: normal effort, lungs clear auscultation bilaterally  ABDOMEN: soft, nondistended, nontender throughout  MUSCULOSKELETAL: no deformity, no lower extremity edema  NEURO: alert, moves all extremities, follows commands  PSYCH:  calm, cooperative  SKIN: warm, dry, small subacute bruise noted on the left upper arm    Vital signs and nursing notes reviewed.          LAB RESULTS  Recent Results (from the past 24 hour(s))   Urinalysis With Microscopic If Indicated (No Culture) - Urine, Clean Catch    Collection Time: 08/13/21  9:36 AM    Specimen: Urine, Clean Catch   Result Value Ref Range    Color, UA Yellow Yellow, Straw    Appearance, UA Clear Clear    pH, UA 6.5 5.0 - 8.0    Specific Gravity, UA 1.013 1.005 - 1.030    Glucose, UA Negative Negative    Ketones, UA Negative Negative    Bilirubin, UA Negative Negative    Blood, UA Negative Negative    Protein, UA Negative Negative    Leuk Esterase, UA Negative Negative    Nitrite, UA Negative Negative    Urobilinogen, UA 0.2 E.U./dL 0.2 - 1.0 E.U./dL   Urine Drug Screen - Urine, Clean Catch    Collection Time: 08/13/21  9:36 AM     Specimen: Urine, Clean Catch   Result Value Ref Range    Amphet/Methamphet, Screen Negative Negative    Barbiturates Screen, Urine Negative Negative    Benzodiazepine Screen, Urine Negative Negative    Cocaine Screen, Urine Negative Negative    Opiate Screen Negative Negative    THC, Screen, Urine Negative Negative    Methadone Screen, Urine Negative Negative    Oxycodone Screen, Urine Negative Negative   ECG 12 Lead    Collection Time: 08/13/21  9:39 AM   Result Value Ref Range    QT Interval 376 ms   Comprehensive Metabolic Panel    Collection Time: 08/13/21  9:40 AM    Specimen: Blood   Result Value Ref Range    Glucose 86 65 - 99 mg/dL    BUN 11 6 - 20 mg/dL    Creatinine 0.77 0.57 - 1.00 mg/dL    Sodium 141 136 - 145 mmol/L    Potassium 3.7 3.5 - 5.2 mmol/L    Chloride 115 (H) 98 - 107 mmol/L    CO2 17.1 (L) 22.0 - 29.0 mmol/L    Calcium 8.1 (L) 8.6 - 10.5 mg/dL    Total Protein 6.5 6.0 - 8.5 g/dL    Albumin 4.40 3.50 - 5.20 g/dL    ALT (SGPT) 14 1 - 33 U/L    AST (SGOT) 19 1 - 32 U/L    Alkaline Phosphatase 53 39 - 117 U/L    Total Bilirubin 0.5 0.0 - 1.2 mg/dL    eGFR Non African Amer 84 >60 mL/min/1.73    Globulin 2.1 gm/dL    A/G Ratio 2.1 g/dL    BUN/Creatinine Ratio 14.3 7.0 - 25.0    Anion Gap 8.9 5.0 - 15.0 mmol/L   hCG, Serum, Qualitative    Collection Time: 08/13/21  9:40 AM    Specimen: Blood   Result Value Ref Range    HCG Qualitative Negative Negative   BNP    Collection Time: 08/13/21  9:40 AM    Specimen: Blood   Result Value Ref Range    proBNP 39.8 0.0 - 450.0 pg/mL   Troponin    Collection Time: 08/13/21  9:40 AM    Specimen: Blood   Result Value Ref Range    Troponin T <0.010 0.000 - 0.030 ng/mL   TSH    Collection Time: 08/13/21  9:40 AM    Specimen: Blood   Result Value Ref Range    TSH 1.290 0.270 - 4.200 uIU/mL   T4, Free    Collection Time: 08/13/21  9:40 AM    Specimen: Blood   Result Value Ref Range    Free T4 1.11 0.93 - 1.70 ng/dL   CBC Auto Differential    Collection Time: 08/13/21   9:40 AM    Specimen: Blood   Result Value Ref Range    WBC 7.78 3.40 - 10.80 10*3/mm3    RBC 3.69 (L) 3.77 - 5.28 10*6/mm3    Hemoglobin 11.3 (L) 12.0 - 15.9 g/dL    Hematocrit 33.0 (L) 34.0 - 46.6 %    MCV 89.4 79.0 - 97.0 fL    MCH 30.6 26.6 - 33.0 pg    MCHC 34.2 31.5 - 35.7 g/dL    RDW 16.6 (H) 12.3 - 15.4 %    RDW-SD 53.5 37.0 - 54.0 fl    MPV 11.2 6.0 - 12.0 fL    Platelets 260 140 - 450 10*3/mm3    Neutrophil % 64.3 42.7 - 76.0 %    Lymphocyte % 28.0 19.6 - 45.3 %    Monocyte % 5.4 5.0 - 12.0 %    Eosinophil % 1.4 0.3 - 6.2 %    Basophil % 0.6 0.0 - 1.5 %    Immature Grans % 0.3 0.0 - 0.5 %    Neutrophils, Absolute 5.00 1.70 - 7.00 10*3/mm3    Lymphocytes, Absolute 2.18 0.70 - 3.10 10*3/mm3    Monocytes, Absolute 0.42 0.10 - 0.90 10*3/mm3    Eosinophils, Absolute 0.11 0.00 - 0.40 10*3/mm3    Basophils, Absolute 0.05 0.00 - 0.20 10*3/mm3    Immature Grans, Absolute 0.02 0.00 - 0.05 10*3/mm3    nRBC 0.0 0.0 - 0.2 /100 WBC       Ordered the above labs and reviewed the results.        RADIOLOGY  No Radiology Exams Resulted Within Past 24 Hours    Ordered the above noted radiological studies. Reviewed by me in PACS.            PROCEDURES  Procedures            MEDICATIONS GIVEN IN ER  Medications - No data to display                MEDICAL DECISION MAKING, PROGRESS, and CONSULTS    All labs have been independently reviewed by me.  All radiology studies have been reviewed by me and discussed with radiologist dictating the report.   EKG's independently viewed and interpreted by me.  Discussion below represents my analysis of pertinent findings related to patient's condition, differential diagnosis, treatment plan and final disposition.      Differential diagnosis includes but is not limited to:  Vasovagal syncope  Dehydration  Cardiac arrhythmia  Anemia      ED Course as of Aug 13 1510   Fri Aug 13, 2021   1005 EKG          EKG time: 0939  Rhythm/Rate: Sinus rhythm, 76  P waves and CA: Normal  QRS, axis: Normal  axis  ST and T waves: No acute changes  No Brugada, no delta wave, no QT prolongation.        Interpreted Contemporaneously by me, independently viewed  Similar compared to prior 9/10/2020          [JR]   1037 Nitrite, UA: Negative [JR]   1037 Amphet/Methamphet, Screen: Negative [JR]   1037 Hemoglobin(!): 11.3 [JR]   1037 WBC: 7.78 [JR]   1037 HCG Qualitative: Negative [JR]   1037 proBNP: 39.8 [JR]   1037 TSH Baseline: 1.290 [JR]   1037 Free T4: 1.11 [JR]   1037 Troponin T: <0.010 [JR]   1038 CO2(!): 17.1 [JR]   1038 Anion Gap: 8.9 [JR]   1038 Creatinine: 0.77 [JR]   1038 Hemoglobin is stable today.     [JR]   1140 Patient had abnormal platelet function test on 8/11, supposed to see heme regarding possible VWD.    [JR]   1145 Patient updated on reassuring laboratory studies today and plan to discharge home with PCP follow-up and return precautions.    [JR]      ED Course User Index  [JR] Saqib Levy MD       Patient presents after a reported syncopal episode that occurred at work today.  She did have a prodrome of symptoms concerning for vasovagal syncope.  Her EKG here appears normal.  She is hemodynamically stable.  She has been having heavy menstrual bleeding recently but her hemoglobin appears stable compared to previous.  Serum pregnancy test is negative.  She received IV fluids prior to arrival from EMS.  She has been ambulating to the bathroom independently without difficulty.  She is appropriate discharge home at this time with PCP follow-up, return precautions.           I wore a mask, face shield, and gloves during this patient encounter.  Patient also wearing a surgical mask.  Hand hygeine performed before and after seeing the patient.    DIAGNOSIS  Final diagnoses:   Syncope and collapse         DISPOSITION  DISCHARGE    Patient discharged in stable condition.    Reviewed implications of results, diagnosis, meds, responsibility to follow up, warning signs and symptoms of possible worsening,  potential complications and reasons to return to ER.    Patient/Family voiced understanding of above instructions.    Discussed plan for discharge, as there is no emergent indication for admission. Patient referred to primary care provider for BP management due to today's BP. Pt/family is agreeable and understands need for follow up and repeat testing.  Pt is aware that discharge does not mean that nothing is wrong but it indicates no emergency is present that requires admission and they must continue care with follow-up as given below or physician of their choice.     FOLLOW-UP  Erlin Allred Jr., MD  [address] [place] [address] [place] [address]  [phone]    Schedule an appointment as soon as possible for a visit            Medication List      No changes were made to your prescriptions during this visit.                   Latest Documented Vital Signs:  As of 15:10 EDT  BP- 118/66 HR- 75 Temp- 98.4 °F (36.9 °C) (Tympanic) O2 sat- 99%        --    Please note that portions of this were completed with a voice recognition program.          Saqib Levy MD  08/13/21 1787

## 2021-08-13 NOTE — ED NOTES
"Pt walked to bathroom with steady gait and no complaints of feeling ill. I walked with patient to the bathroom. Pt came out of the bathroom and walked up to the nurses desk and began yelling & cursing at me. Pt stated, \"You are a fucking bitch for not coming in the bathroom with me!\", \"You made me change my fucking tampon by myself!\". Pt was directed back into her exam room. Pt continued to yell at me and stated, \"You are a scaredy cat because you are wearing a face shield!\"   Charge Nurse notified of patient's behavior.        Sade Couch, RN  08/13/21 1007    "

## 2021-08-14 LAB — QT INTERVAL: 376 MS

## 2021-09-01 ENCOUNTER — LAB (OUTPATIENT)
Dept: LAB | Facility: HOSPITAL | Age: 36
End: 2021-09-01

## 2021-09-01 ENCOUNTER — CONSULT (OUTPATIENT)
Dept: ONCOLOGY | Facility: CLINIC | Age: 36
End: 2021-09-01

## 2021-09-01 VITALS
HEIGHT: 59 IN | TEMPERATURE: 97.8 F | HEART RATE: 93 BPM | SYSTOLIC BLOOD PRESSURE: 118 MMHG | DIASTOLIC BLOOD PRESSURE: 75 MMHG | BODY MASS INDEX: 27.21 KG/M2 | RESPIRATION RATE: 18 BRPM | WEIGHT: 135 LBS

## 2021-09-01 DIAGNOSIS — D69.1 PLATELET FUNCTION DEFECT (HCC): ICD-10-CM

## 2021-09-01 DIAGNOSIS — D69.1 PLATELET FUNCTION DEFECT (HCC): Primary | ICD-10-CM

## 2021-09-01 PROBLEM — H50.15 ALTERNATING EXOTROPIA: Status: ACTIVE | Noted: 2021-05-17

## 2021-09-01 PROBLEM — H53.2 DIPLOPIA: Status: ACTIVE | Noted: 2021-05-14

## 2021-09-01 PROBLEM — S05.10XA CONTUSION OF EYE: Status: ACTIVE | Noted: 2021-05-17

## 2021-09-01 LAB
ALBUMIN SERPL-MCNC: 4.9 G/DL (ref 3.5–5.2)
ALBUMIN/GLOB SERPL: 2 G/DL
ALP SERPL-CCNC: 57 U/L (ref 39–117)
ALT SERPL W P-5'-P-CCNC: 12 U/L (ref 1–33)
ANION GAP SERPL CALCULATED.3IONS-SCNC: 12 MMOL/L (ref 5–15)
APTT PPP: 25.1 SECONDS (ref 24–31)
AST SERPL-CCNC: 14 U/L (ref 1–32)
BASOPHILS # BLD AUTO: 0.04 10*3/MM3 (ref 0–0.2)
BASOPHILS NFR BLD AUTO: 0.5 % (ref 0–1.5)
BILIRUB SERPL-MCNC: 0.7 MG/DL (ref 0–1.2)
BUN SERPL-MCNC: 19 MG/DL (ref 6–20)
BUN/CREAT SERPL: 21.8 (ref 7–25)
CALCIUM SPEC-SCNC: 8.9 MG/DL (ref 8.6–10.5)
CHLORIDE SERPL-SCNC: 107 MMOL/L (ref 98–107)
CLOSURE TME COLL+ADP BLD: NORMAL S
CLOSURE TME COLL+EPINEP BLD: 137 SECONDS (ref 64–160)
CO2 SERPL-SCNC: 22 MMOL/L (ref 22–29)
CREAT SERPL-MCNC: 0.87 MG/DL (ref 0.57–1)
DEPRECATED RDW RBC AUTO: 52.8 FL (ref 37–54)
EOSINOPHIL # BLD AUTO: 0.23 10*3/MM3 (ref 0–0.4)
EOSINOPHIL NFR BLD AUTO: 3 % (ref 0.3–6.2)
ERYTHROCYTE [DISTWIDTH] IN BLOOD BY AUTOMATED COUNT: 17.2 % (ref 12.3–15.4)
FERRITIN SERPL-MCNC: 79.5 NG/ML (ref 13–150)
FIBRINOGEN PPP-MCNC: 328 MG/DL (ref 210–450)
GFR SERPL CREATININE-BSD FRML MDRD: 73 ML/MIN/1.73
GLOBULIN UR ELPH-MCNC: 2.5 GM/DL
GLUCOSE SERPL-MCNC: 65 MG/DL (ref 65–99)
HCT VFR BLD AUTO: 35.4 % (ref 34–46.6)
HGB BLD-MCNC: 12.5 G/DL (ref 12–15.9)
INR PPP: <0.93 (ref 0.93–1.1)
IRON 24H UR-MRATE: 55 MCG/DL (ref 37–145)
IRON SATN MFR SERPL: 16 % (ref 20–50)
LYMPHOCYTES # BLD AUTO: 3.1 10*3/MM3 (ref 0.7–3.1)
LYMPHOCYTES NFR BLD AUTO: 39.8 % (ref 19.6–45.3)
MCH RBC QN AUTO: 30.3 PG (ref 26.6–33)
MCHC RBC AUTO-ENTMCNC: 35.3 G/DL (ref 31.5–35.7)
MCV RBC AUTO: 85.7 FL (ref 79–97)
MONOCYTES # BLD AUTO: 0.63 10*3/MM3 (ref 0.1–0.9)
MONOCYTES NFR BLD AUTO: 8.1 % (ref 5–12)
NEUTROPHILS NFR BLD AUTO: 3.78 10*3/MM3 (ref 1.7–7)
NEUTROPHILS NFR BLD AUTO: 48.6 % (ref 42.7–76)
PLATELET # BLD AUTO: 190 10*3/MM3 (ref 140–450)
PMV BLD AUTO: 12.6 FL (ref 6–12)
POTASSIUM SERPL-SCNC: 3.6 MMOL/L (ref 3.5–5.2)
PROT SERPL-MCNC: 7.4 G/DL (ref 6–8.5)
PROTHROMBIN TIME: 10.3 SECONDS (ref 9.6–11.7)
RBC # BLD AUTO: 4.13 10*6/MM3 (ref 3.77–5.28)
RETICS # AUTO: 0.16 10*6/MM3 (ref 0.02–0.13)
RETICS/RBC NFR AUTO: 3.75 % (ref 0.7–1.9)
SODIUM SERPL-SCNC: 141 MMOL/L (ref 136–145)
TIBC SERPL-MCNC: 347 MCG/DL (ref 298–536)
TRANSFERRIN SERPL-MCNC: 233 MG/DL (ref 200–360)
WBC # BLD AUTO: 7.78 10*3/MM3 (ref 3.4–10.8)

## 2021-09-01 PROCEDURE — 82607 VITAMIN B-12: CPT | Performed by: INTERNAL MEDICINE

## 2021-09-01 PROCEDURE — 82728 ASSAY OF FERRITIN: CPT | Performed by: INTERNAL MEDICINE

## 2021-09-01 PROCEDURE — 83540 ASSAY OF IRON: CPT | Performed by: INTERNAL MEDICINE

## 2021-09-01 PROCEDURE — 36415 COLL VENOUS BLD VENIPUNCTURE: CPT | Performed by: INTERNAL MEDICINE

## 2021-09-01 PROCEDURE — 85610 PROTHROMBIN TIME: CPT | Performed by: INTERNAL MEDICINE

## 2021-09-01 PROCEDURE — 85730 THROMBOPLASTIN TIME PARTIAL: CPT | Performed by: INTERNAL MEDICINE

## 2021-09-01 PROCEDURE — 84466 ASSAY OF TRANSFERRIN: CPT | Performed by: INTERNAL MEDICINE

## 2021-09-01 PROCEDURE — 85045 AUTOMATED RETICULOCYTE COUNT: CPT | Performed by: INTERNAL MEDICINE

## 2021-09-01 PROCEDURE — 82746 ASSAY OF FOLIC ACID SERUM: CPT | Performed by: INTERNAL MEDICINE

## 2021-09-01 PROCEDURE — 85384 FIBRINOGEN ACTIVITY: CPT | Performed by: INTERNAL MEDICINE

## 2021-09-01 PROCEDURE — 85576 BLOOD PLATELET AGGREGATION: CPT | Performed by: INTERNAL MEDICINE

## 2021-09-01 PROCEDURE — 85025 COMPLETE CBC W/AUTO DIFF WBC: CPT

## 2021-09-01 PROCEDURE — 99204 OFFICE O/P NEW MOD 45 MIN: CPT | Performed by: INTERNAL MEDICINE

## 2021-09-01 PROCEDURE — 80053 COMPREHEN METABOLIC PANEL: CPT | Performed by: INTERNAL MEDICINE

## 2021-09-01 NOTE — PROGRESS NOTES
Hematology/Oncology Outpatient Consultation    Patient name: Krupa Concepcion  : 1985  MRN: [id number]  Primary Care Physician: Erlin Allred Jr., MD  Referring Physician: Erlin Allred Jr., *  Reason For Consult:     Chief Complaint   Patient presents with   • Appointment     Platelet function defect       History of Present Illness:      This is a 36-year-old female who has been referred secondary to increased bruising.  Patient states that this has been going on for the past 6 months.  Mostly involves her arms and lower extremities.  She has been taking Motrin and Tylenol for body aches and pains which she experiences on her current job.  Over the past 3 weeks she has not needed any nonsteroidals and her bruising seems to be abating.  She occasionally will have gum bleeds, nosebleeds.  Denies GI bleed or blood in her urine.  She does have heavy menstrual cycles which last anywhere from 7 to 8 days.  Sometimes she has 2 cycles in a month.  She is currently established with Dr. CHANDA Alvarado with gynecology.    Her Pap smear is up-to-date.    There is no family history of any hematologic problems that she is aware of.      Past Medical History:   Diagnosis Date   • Anxiety    • Arrhythmia    • Asthma    • Chest pain 2019   • Depression    • Disease of thyroid gland    • GERD (gastroesophageal reflux disease)    • Migraine    • Schizoaffective disorder (CMS/HCC)        Past Surgical History:   Procedure Laterality Date   • CARDIAC CATHETERIZATION N/A 2020    Procedure: Left Heart Cath with Coronary Angiography;  Surgeon: Lilibeth Langley MD;  Location: [facility] CATH INVASIVE LOCATION;  Service: Cardiovascular;  Laterality: N/A;   • COLONOSCOPY     • DENTAL PROCEDURE     • EAR TUBES Bilateral     x2 times   • ENDOSCOPY     • TONSILLECTOMY           Current Outpatient Medications:   •  EPINEPHrine (EPIPEN) 0.3 MG/0.3ML solution auto-injector injection, Inject 0.3 mL under the skin into the  appropriate area as directed 1 (One) Time As Needed (anaphylaxis)., Disp: 1 each, Rfl: 1  •  levothyroxine (SYNTHROID, LEVOTHROID) 50 MCG tablet, TAKE 1 TABLET BY MOUTH EVERY DAY, Disp: 30 tablet, Rfl: 5  •  Melatonin 5 MG capsule, , Disp: , Rfl:   •  Multiple Vitamins-Minerals (MULTIVITAMIN WITH MINERALS) tablet tablet, Take 1 tablet by mouth Daily., Disp: , Rfl:   •  PARAGARD INTRAUTERINE COPPER intrauterine device IUD, 1 each by Intrauterine route 1 (One) Time., Disp: , Rfl:   •  QUEtiapine (SEROquel) 200 MG tablet, Take 1 tablet by mouth Every Night., Disp: 90 tablet, Rfl: 1  •  topiramate (TOPAMAX) 200 MG tablet, TAKE 1 TABLET BY MOUTH EVERYDAY AT BEDTIME, Disp: 90 tablet, Rfl: 1    Allergies   Allergen Reactions   • Latex Itching   • Parabens Anaphylaxis   • Tea Tree Oil Anaphylaxis   • Zolpidem Tartrate Mental Status Change   • Benzonatate Other (See Comments)   • Trazodone Other (See Comments)   • Cefdinir Hallucinations     hallucinations       Immunization History   Administered Date(s) Administered   • FluLaval >6 Months 09/30/2020   • Pneumococcal Polysaccharide (PPSV23) 12/12/2008   • flucelvax quad pfs =>4 YRS 09/27/2019       Family History   Problem Relation Age of Onset   • Hypertension Mother    • Migraines Mother    • Osteoarthritis Mother    • Hyperlipidemia Father    • Hypertension Father    • Prostate cancer Father        Cancer-related family history includes Prostate cancer in her father.    Social History     Tobacco Use   • Smoking status: Never Smoker   • Smokeless tobacco: Never Used   Vaping Use   • Vaping Use: Never used   Substance Use Topics   • Alcohol use: Yes     Alcohol/week: 2.0 standard drinks     Types: 2 Glasses of wine per week     Comment: 2-3 drinks per week   • Drug use: No       ROS:    Review of Systems   Constitutional: Negative for chills and fever.   HENT: Negative for ear pain, mouth sores, nosebleeds and sore throat.    Eyes: Negative for photophobia and visual  "disturbance.   Respiratory: Negative for wheezing and stridor.    Cardiovascular: Negative for chest pain and palpitations.   Gastrointestinal: Negative for abdominal pain, diarrhea, nausea and vomiting.   Endocrine: Negative for cold intolerance and heat intolerance.   Genitourinary: Negative for dysuria and hematuria.   Musculoskeletal: Negative for joint swelling and neck stiffness.   Skin: Negative for color change and rash.   Neurological: Negative for seizures and syncope.   Hematological: Negative for adenopathy.        No obvious bleeding   Psychiatric/Behavioral: Negative for agitation, confusion and hallucinations.       Objective:    Vitals:    09/01/21 1525   BP: 118/75   Pulse: 93   Resp: 18   Temp: 97.8 °F (36.6 °C)   TempSrc: Infrared   Weight: 61.2 kg (135 lb)   Height: 149.9 cm (59\")   PainSc: 0-No pain     Body mass index is 27.27 kg/m².    ECOG  (0) Fully active, able to carry on all predisease performance without restriction    Physical Exam:  Physical Exam  Vitals and nursing note reviewed.   Constitutional:       General: She is not in acute distress.     Appearance: She is not diaphoretic.   HENT:      Head: Normocephalic and atraumatic.   Eyes:      General: No scleral icterus.        Right eye: No discharge.         Left eye: No discharge.      Conjunctiva/sclera: Conjunctivae normal.   Neck:      Thyroid: No thyromegaly.   Cardiovascular:      Rate and Rhythm: Normal rate and regular rhythm.      Heart sounds: Normal heart sounds. No friction rub. No gallop.    Pulmonary:      Effort: Pulmonary effort is normal. No respiratory distress.      Breath sounds: No stridor. No wheezing.   Abdominal:      General: Bowel sounds are normal.      Palpations: Abdomen is soft. There is no mass.      Tenderness: There is no abdominal tenderness. There is no guarding or rebound.   Musculoskeletal:         General: No tenderness. Normal range of motion.      Cervical back: Normal range of motion and neck " supple.   Lymphadenopathy:      Cervical: No cervical adenopathy.   Skin:     General: Skin is warm.      Findings: No erythema or rash.   Neurological:      Mental Status: She is alert and oriented to person, place, and time.      Motor: No abnormal muscle tone.   Psychiatric:         Behavior: Behavior normal.         RECENT LABS  WBC   Date Value Ref Range Status   08/13/2021 7.78 3.40 - 10.80 10*3/mm3 Final     RBC   Date Value Ref Range Status   08/13/2021 3.69 (L) 3.77 - 5.28 10*6/mm3 Final     Hemoglobin   Date Value Ref Range Status   08/13/2021 11.3 (L) 12.0 - 15.9 g/dL Final     Hematocrit   Date Value Ref Range Status   08/13/2021 33.0 (L) 34.0 - 46.6 % Final     MCV   Date Value Ref Range Status   08/13/2021 89.4 79.0 - 97.0 fL Final     MCH   Date Value Ref Range Status   08/13/2021 30.6 26.6 - 33.0 pg Final     MCHC   Date Value Ref Range Status   08/13/2021 34.2 31.5 - 35.7 g/dL Final     RDW   Date Value Ref Range Status   08/13/2021 16.6 (H) 12.3 - 15.4 % Final     RDW-SD   Date Value Ref Range Status   08/13/2021 53.5 37.0 - 54.0 fl Final     MPV   Date Value Ref Range Status   08/13/2021 11.2 6.0 - 12.0 fL Final     Platelets   Date Value Ref Range Status   08/13/2021 260 140 - 450 10*3/mm3 Final     Neutrophil %   Date Value Ref Range Status   08/13/2021 64.3 42.7 - 76.0 % Final     Lymphocyte %   Date Value Ref Range Status   08/13/2021 28.0 19.6 - 45.3 % Final     Monocyte %   Date Value Ref Range Status   08/13/2021 5.4 5.0 - 12.0 % Final     Eosinophil %   Date Value Ref Range Status   08/13/2021 1.4 0.3 - 6.2 % Final     Basophil %   Date Value Ref Range Status   08/13/2021 0.6 0.0 - 1.5 % Final     Immature Grans %   Date Value Ref Range Status   08/13/2021 0.3 0.0 - 0.5 % Final     Neutrophils, Absolute   Date Value Ref Range Status   08/13/2021 5.00 1.70 - 7.00 10*3/mm3 Final     Lymphocytes, Absolute   Date Value Ref Range Status   08/13/2021 2.18 0.70 - 3.10 10*3/mm3 Final      Monocytes, Absolute   Date Value Ref Range Status   08/13/2021 0.42 0.10 - 0.90 10*3/mm3 Final     Eosinophils, Absolute   Date Value Ref Range Status   08/13/2021 0.11 0.00 - 0.40 10*3/mm3 Final     Basophils, Absolute   Date Value Ref Range Status   08/13/2021 0.05 0.00 - 0.20 10*3/mm3 Final     Immature Grans, Absolute   Date Value Ref Range Status   08/13/2021 0.02 0.00 - 0.05 10*3/mm3 Final     nRBC   Date Value Ref Range Status   08/13/2021 0.0 0.0 - 0.2 /100 WBC Final       Lab Results   Component Value Date    GLUCOSE 86 08/13/2021    BUN 11 08/13/2021    CREATININE 0.77 08/13/2021    EGFRIFNONA 84 08/13/2021    BCR 14.3 08/13/2021    K 3.7 08/13/2021    CO2 17.1 (L) 08/13/2021    CALCIUM 8.1 (L) 08/13/2021    ALBUMIN 4.40 08/13/2021    LABIL2 1.4 02/02/2019    AST 19 08/13/2021    ALT 14 08/13/2021         Assessment/Plan   Platelet function defect (CMS/HCC)  - CBC & Differential      1. Bruising likely secondary to nonsteroidal anti-inflammatory agents.  There is no family history of any bleeding diastasis.  2. Normocytic anemia, will work-up further    Plans    We will work-up further by checking for von Willebrand studies, PFA-100.    Check B12, folate and reticulocyte count as well    Follow-up 3 weeks to review results              Patient verbalized understanding and is in agreement of the above plan.          I spent 45 total minutes, face-to-face, caring for Krupa today.  80% of this time involved counseling and/or coordination of care as documented within this note.

## 2021-09-02 LAB
FOLATE SERPL-MCNC: 19.1 NG/ML (ref 4.78–24.2)
VIT B12 BLD-MCNC: 413 PG/ML (ref 211–946)

## 2021-09-03 LAB
FACT VIII ACT/NOR PPP: 155 % (ref 56–140)
PATH INTERP BLD-IMP: NORMAL
THROMBIN TIME: 15.6 SEC (ref 0–23)
VWF AG ACT/NOR PPP IA: 126 % (ref 50–200)
VWF:RCO ACT/NOR PPP PL AGG: 117 % (ref 50–200)

## 2021-09-29 ENCOUNTER — LAB (OUTPATIENT)
Dept: LAB | Facility: HOSPITAL | Age: 36
End: 2021-09-29

## 2021-09-29 ENCOUNTER — OFFICE VISIT (OUTPATIENT)
Dept: ONCOLOGY | Facility: CLINIC | Age: 36
End: 2021-09-29

## 2021-09-29 ENCOUNTER — TELEPHONE (OUTPATIENT)
Dept: ONCOLOGY | Facility: CLINIC | Age: 36
End: 2021-09-29

## 2021-09-29 VITALS
SYSTOLIC BLOOD PRESSURE: 121 MMHG | WEIGHT: 129 LBS | DIASTOLIC BLOOD PRESSURE: 86 MMHG | TEMPERATURE: 98 F | RESPIRATION RATE: 18 BRPM | HEIGHT: 59 IN | HEART RATE: 99 BPM | BODY MASS INDEX: 26 KG/M2

## 2021-09-29 DIAGNOSIS — D69.1 PLATELET FUNCTION DEFECT (HCC): ICD-10-CM

## 2021-09-29 DIAGNOSIS — D69.1 PLATELET FUNCTION DEFECT (HCC): Primary | ICD-10-CM

## 2021-09-29 LAB
BASOPHILS # BLD AUTO: 0.06 10*3/MM3 (ref 0–0.2)
BASOPHILS NFR BLD AUTO: 0.7 % (ref 0–1.5)
DEPRECATED RDW RBC AUTO: 51.2 FL (ref 37–54)
EOSINOPHIL # BLD AUTO: 0.13 10*3/MM3 (ref 0–0.4)
EOSINOPHIL NFR BLD AUTO: 1.5 % (ref 0.3–6.2)
ERYTHROCYTE [DISTWIDTH] IN BLOOD BY AUTOMATED COUNT: 16.6 % (ref 12.3–15.4)
HCT VFR BLD AUTO: 35.8 % (ref 34–46.6)
HGB BLD-MCNC: 12.3 G/DL (ref 12–15.9)
LYMPHOCYTES # BLD AUTO: 3.18 10*3/MM3 (ref 0.7–3.1)
LYMPHOCYTES NFR BLD AUTO: 37.1 % (ref 19.6–45.3)
MCH RBC QN AUTO: 29.8 PG (ref 26.6–33)
MCHC RBC AUTO-ENTMCNC: 34.4 G/DL (ref 31.5–35.7)
MCV RBC AUTO: 86.7 FL (ref 79–97)
MONOCYTES # BLD AUTO: 0.66 10*3/MM3 (ref 0.1–0.9)
MONOCYTES NFR BLD AUTO: 7.7 % (ref 5–12)
NEUTROPHILS NFR BLD AUTO: 4.55 10*3/MM3 (ref 1.7–7)
NEUTROPHILS NFR BLD AUTO: 53 % (ref 42.7–76)
PLATELET # BLD AUTO: 298 10*3/MM3 (ref 140–450)
PMV BLD AUTO: 10.9 FL (ref 6–12)
RBC # BLD AUTO: 4.13 10*6/MM3 (ref 3.77–5.28)
WBC # BLD AUTO: 8.58 10*3/MM3 (ref 3.4–10.8)

## 2021-09-29 PROCEDURE — 85025 COMPLETE CBC W/AUTO DIFF WBC: CPT

## 2021-09-29 PROCEDURE — 36415 COLL VENOUS BLD VENIPUNCTURE: CPT

## 2021-09-29 PROCEDURE — 99214 OFFICE O/P EST MOD 30 MIN: CPT | Performed by: INTERNAL MEDICINE

## 2021-09-29 NOTE — TELEPHONE ENCOUNTER
PATIENT CAME UP AND STATED THAT SHE WANTED TO BE CHECKED OUT.  I LOOKED TO SEE IF HER WRAP UP WAS IN AND IT WASN'T I ASK HER TO HAVE A SEAT AND AS SOON AS IT WAS IN I COULD GET HER SCHEDULED AND CHECKED OUT.  SHE THEN STATED THAT SHE HAD BEEN UP SINCE 2:30 THIS MORNING AND SHE WAS NOT GOING TO DO ANY MORE TESTING AND THAT THE DOCTOR WAS JUST MILKING HER INSURANCE.  SHE THEN STATED THAT SHE WAS NOT COMING BACK THAT SHE WAS GOING TO HAVE HER PCP REFER HER TO SOMEONE ELSE.

## 2021-09-29 NOTE — PROGRESS NOTES
Hematology/Oncology Outpatient Follow Up    PATIENT NAME:Krupa oCncepcion  :1985  MRN: [id number]  PRIMARY CARE PHYSICIAN: Erlin Allred Jr., MD  REFERRING PHYSICIAN: Erlin Allred Jr., *    Chief Complaint   Patient presents with   • Follow-up     platelet function defect        HISTORY OF PRESENT ILLNESS:     This is a 36-year-old female who has been referred secondary to increased bruising.  Patient states that this has been going on for the past 6 months.  Mostly involves her arms and lower extremities.  She has been taking Motrin and Tylenol for body aches and pains which she experiences on her current job.  Over the past 3 weeks she has not needed any nonsteroidals and her bruising seems to be abating.  She occasionally will have gum bleeds, nosebleeds.  Denies GI bleed or blood in her urine.  She does have heavy menstrual cycles which last anywhere from 7 to 8 days.  Sometimes she has 2 cycles in a month.  She is currently established with Dr. CHANDA Alvarado with gynecology.     Her Pap smear is up-to-date.    · 2021 patient had repeat PFA-100 which was normal at 137 seconds range is 64 260, her CMP showed unremarkable results, PTT is normal at 25, PT is 10.3 and INR is less than 0.93, fibrinogen level is normal at 328 thrombin time is normal at 15.6, ferritin was 79, the  rest of her iron panel was essentially unremarkable, B12 was 413, folate was 19, reticulocyte count is 3.75.  Patient had a factor VIII activity which was elevated to 155% range is , von Willebrand antigen is normal at 126 and range is  and von Willebrand factor activity level is normal at 117% range is 5 to 200.  · 2021 her white count is 8.5, hemoglobin is 12.3, MCV is normal at 86 and platelets are 298.  Differentials are 53% neutrophils, 37% lymphocytes, there is no monocytosis, eosinophilia or basophilia.  Past Medical History:   Diagnosis Date   • Anxiety    • Arrhythmia    • Asthma    • Chest pain  8/14/2019   • Depression    • Disease of thyroid gland    • GERD (gastroesophageal reflux disease)    • Migraine    • Schizoaffective disorder (CMS/HCC)        Past Surgical History:   Procedure Laterality Date   • CARDIAC CATHETERIZATION N/A 7/8/2020    Procedure: Left Heart Cath with Coronary Angiography;  Surgeon: Lilibeth Langley MD;  Location: [facility] CATH INVASIVE LOCATION;  Service: Cardiovascular;  Laterality: N/A;   • COLONOSCOPY     • DENTAL PROCEDURE     • EAR TUBES Bilateral     x2 times   • ENDOSCOPY     • TONSILLECTOMY           Current Outpatient Medications:   •  EPINEPHrine (EPIPEN) 0.3 MG/0.3ML solution auto-injector injection, Inject 0.3 mL under the skin into the appropriate area as directed 1 (One) Time As Needed (anaphylaxis)., Disp: 1 each, Rfl: 1  •  levothyroxine (SYNTHROID, LEVOTHROID) 50 MCG tablet, TAKE 1 TABLET BY MOUTH EVERY DAY, Disp: 30 tablet, Rfl: 5  •  Melatonin 5 MG capsule, , Disp: , Rfl:   •  Multiple Vitamins-Minerals (MULTIVITAMIN WITH MINERALS) tablet tablet, Take 1 tablet by mouth Daily., Disp: , Rfl:   •  PARAGARD INTRAUTERINE COPPER intrauterine device IUD, 1 each by Intrauterine route 1 (One) Time., Disp: , Rfl:   •  QUEtiapine (SEROquel) 200 MG tablet, Take 1 tablet by mouth Every Night., Disp: 90 tablet, Rfl: 1  •  topiramate (TOPAMAX) 200 MG tablet, TAKE 1 TABLET BY MOUTH EVERYDAY AT BEDTIME, Disp: 90 tablet, Rfl: 1    Allergies   Allergen Reactions   • Latex Itching   • Parabens Anaphylaxis   • Tea Tree Oil Anaphylaxis   • Zolpidem Tartrate Mental Status Change   • Benzonatate Other (See Comments)   • Trazodone Other (See Comments)   • Cefdinir Hallucinations     hallucinations       Family History   Problem Relation Age of Onset   • Hypertension Mother    • Migraines Mother    • Osteoarthritis Mother    • Hyperlipidemia Father    • Hypertension Father    • Prostate cancer Father        Cancer-related family history includes Prostate cancer in her father.    Social  "History     Tobacco Use   • Smoking status: Never Smoker   • Smokeless tobacco: Never Used   Vaping Use   • Vaping Use: Never used   Substance Use Topics   • Alcohol use: Yes     Alcohol/week: 2.0 standard drinks     Types: 2 Glasses of wine per week     Comment: 2-3 drinks per week   • Drug use: No       HPI, ROS and PFSH have been reviewed and confirmed on 9/29/2021.     SUBJECTIVE:      She is here today for follow-up on her lab results    REVIEW OF SYSTEMS:  Review of Systems   No changes reported    OBJECTIVE:    Vitals:    09/29/21 1552   BP: 121/86   Pulse: 99   Resp: 18   Temp: 98 °F (36.7 °C)   TempSrc: Infrared   Weight: 58.5 kg (129 lb)   Height: 149.9 cm (59\")     Body mass index is 26.05 kg/m².    ECOG  (0) Fully active, able to carry on all predisease performance without restriction    Physical Exam     No changes    RECENT LABS  WBC   Date Value Ref Range Status   09/29/2021 8.58 3.40 - 10.80 10*3/mm3 Final     RBC   Date Value Ref Range Status   09/29/2021 4.13 3.77 - 5.28 10*6/mm3 Final     Hemoglobin   Date Value Ref Range Status   09/29/2021 12.3 12.0 - 15.9 g/dL Final     Hematocrit   Date Value Ref Range Status   09/29/2021 35.8 34.0 - 46.6 % Final     MCV   Date Value Ref Range Status   09/29/2021 86.7 79.0 - 97.0 fL Final     MCH   Date Value Ref Range Status   09/29/2021 29.8 26.6 - 33.0 pg Final     MCHC   Date Value Ref Range Status   09/29/2021 34.4 31.5 - 35.7 g/dL Final     RDW   Date Value Ref Range Status   09/29/2021 16.6 (H) 12.3 - 15.4 % Final     RDW-SD   Date Value Ref Range Status   09/29/2021 51.2 37.0 - 54.0 fl Final     MPV   Date Value Ref Range Status   09/29/2021 10.9 6.0 - 12.0 fL Final     Platelets   Date Value Ref Range Status   09/29/2021 298 140 - 450 10*3/mm3 Final     Neutrophil %   Date Value Ref Range Status   09/29/2021 53.0 42.7 - 76.0 % Final     Lymphocyte %   Date Value Ref Range Status   09/29/2021 37.1 19.6 - 45.3 % Final     Monocyte %   Date Value Ref " Range Status   09/29/2021 7.7 5.0 - 12.0 % Final     Eosinophil %   Date Value Ref Range Status   09/29/2021 1.5 0.3 - 6.2 % Final     Basophil %   Date Value Ref Range Status   09/29/2021 0.7 0.0 - 1.5 % Final     Immature Grans %   Date Value Ref Range Status   08/13/2021 0.3 0.0 - 0.5 % Final     Neutrophils, Absolute   Date Value Ref Range Status   09/29/2021 4.55 1.70 - 7.00 10*3/mm3 Final     Lymphocytes, Absolute   Date Value Ref Range Status   09/29/2021 3.18 (H) 0.70 - 3.10 10*3/mm3 Final     Monocytes, Absolute   Date Value Ref Range Status   09/29/2021 0.66 0.10 - 0.90 10*3/mm3 Final     Eosinophils, Absolute   Date Value Ref Range Status   09/29/2021 0.13 0.00 - 0.40 10*3/mm3 Final     Basophils, Absolute   Date Value Ref Range Status   09/29/2021 0.06 0.00 - 0.20 10*3/mm3 Final     Immature Grans, Absolute   Date Value Ref Range Status   08/13/2021 0.02 0.00 - 0.05 10*3/mm3 Final     nRBC   Date Value Ref Range Status   08/13/2021 0.0 0.0 - 0.2 /100 WBC Final       Lab Results   Component Value Date    GLUCOSE 65 09/01/2021    BUN 19 09/01/2021    CREATININE 0.87 09/01/2021    EGFRIFNONA 73 09/01/2021    BCR 21.8 09/01/2021    K 3.6 09/01/2021    CO2 22.0 09/01/2021    CALCIUM 8.9 09/01/2021    ALBUMIN 4.90 09/01/2021    LABIL2 1.4 02/02/2019    AST 14 09/01/2021    ALT 12 09/01/2021         ASSESSMENT:    Platelet function defect (CMS/HCC)  - CBC & Differential        1. Bruising likely secondary to nonsteroidal anti-inflammatory agents.  There is no family history of any bleeding diastasis.  Work-up so far is negative including a repeat PFA-100.  Patient was off Motrin at the time the repeat  was done and her result was normal.  Patient states that she continues to bruise significantly and I therefore recommended checking factor XIII assay as well as D-dimer level.  Patient declines additional blood testing.  She also states that she was asked to discontinue Zyrtec and we both reviewed her  records and there is no Zyrtec on file .  Explained to patient that I have not asked her to discontinue any of her medications at this time.  Patient went on to state that we are ordering a lot of tests just to bill her insurance company to pay out a lot of money.  Patient also states that she no longer wants to follow-up in our office moving forward.  She will follow up with her PCP Dr. Allred.  2. Normocytic anemia     Plans    · No further appointments were given

## 2021-10-15 ENCOUNTER — OFFICE VISIT (OUTPATIENT)
Dept: FAMILY MEDICINE CLINIC | Facility: CLINIC | Age: 36
End: 2021-10-15

## 2021-10-15 VITALS
RESPIRATION RATE: 14 BRPM | WEIGHT: 128.8 LBS | OXYGEN SATURATION: 98 % | HEART RATE: 83 BPM | DIASTOLIC BLOOD PRESSURE: 81 MMHG | BODY MASS INDEX: 25.96 KG/M2 | SYSTOLIC BLOOD PRESSURE: 116 MMHG | HEIGHT: 59 IN | TEMPERATURE: 97.1 F

## 2021-10-15 DIAGNOSIS — T14.8XXA BRUISING: Primary | ICD-10-CM

## 2021-10-15 DIAGNOSIS — Z86.69 HX OF MIGRAINE HEADACHES: Chronic | ICD-10-CM

## 2021-10-15 DIAGNOSIS — E03.9 ACQUIRED HYPOTHYROIDISM: Chronic | ICD-10-CM

## 2021-10-15 DIAGNOSIS — F31.60 BIPOLAR 1 DISORDER, MIXED (HCC): Chronic | ICD-10-CM

## 2021-10-15 PROBLEM — S05.10XA CONTUSION OF EYE: Status: RESOLVED | Noted: 2021-05-17 | Resolved: 2021-10-15

## 2021-10-15 PROCEDURE — 99214 OFFICE O/P EST MOD 30 MIN: CPT | Performed by: FAMILY MEDICINE

## 2021-10-15 RX ORDER — QUETIAPINE FUMARATE 50 MG/1
TABLET, FILM COATED ORAL
Qty: 42 TABLET | Refills: 0 | Status: SHIPPED | OUTPATIENT
Start: 2021-10-15 | End: 2021-11-13 | Stop reason: SDUPTHER

## 2021-10-15 NOTE — PATIENT INSTRUCTIONS
Decrease the dose of the seroquel as instructed.    Continue other medications and treatment.    Follow-up in the office in 6 months or sooner if needed.

## 2021-10-15 NOTE — PROGRESS NOTES
Subjective   Krupa Concepcion is a 36 y.o. female.     Chief Complaint   Patient presents with   • Hypothyroidism     6 month follow up    • bipolar 1 disorder   • Vitamin D Deficiency       HPI  The patient is a 36-year-old white female comes in for follow-up and maintenance of her current problems which include    1.  bipolar disease-stable-The patient is current Seroquel 200 mg daily.  Patient states that she is feeling significantly better.  Patient states that she is in a loving environment.  She has been on her own.  She states that she is doing significantly better since she is living on her own.  She states that she would like to taper off of the Seroquel.    2.  Migraine headaches-stable-patient on topiramate 200 mg at night and sumatriptan as needed.  Patient states that her headaches are well controlled on topiramate.    3.  Hypothyroidism-stable-the patient is on Synthroid 25 mg once a day.  She denied heat or cold intolerance.  Patient states that she has not significant eyelid over the past several months.  The patient is more active.  Patient is weight has stabilized over the past 2 weeks.    4.  Bruising- stable-patient been evaluated by hematologist for bruising.  Specific cause of but not been found.  Patient states she would like to see a different hematologist.    5.  Weight loss-new-patient is lost a significant amount labeled the past 1 months.  She attributes this to being on less medication and being more active and getting out on her own.  Her weight has stabilized recently.        The following portions of the patient's history were reviewed and updated as appropriate: allergies, current medications, past family history, past medical history, past social history, past surgical history and problem list.    Review of Systems    Objective     /81 (BP Location: Left arm, Patient Position: Sitting, Cuff Size: Adult)   Pulse 83   Temp 97.1 °F (36.2 °C) (Infrared)   Resp 14   Ht 149.9 cm  "(59\")   Wt 58.4 kg (128 lb 12.8 oz)   SpO2 98%   BMI 26.01 kg/m²     Physical Exam  Vitals and nursing note reviewed.   Constitutional:       Appearance: She is well-developed. She is obese.   HENT:      Head: Normocephalic and atraumatic.      Nose: Nose normal.      Mouth/Throat:      Mouth: Mucous membranes are moist.      Pharynx: Oropharynx is clear.   Eyes:      Extraocular Movements: Extraocular movements intact.      Conjunctiva/sclera: Conjunctivae normal.      Pupils: Pupils are equal, round, and reactive to light.   Cardiovascular:      Rate and Rhythm: Normal rate and regular rhythm.      Heart sounds: Normal heart sounds.   Pulmonary:      Effort: Pulmonary effort is normal.      Breath sounds: Normal breath sounds.   Abdominal:      General: Abdomen is flat. Bowel sounds are normal.      Palpations: Abdomen is soft.   Musculoskeletal:         General: Normal range of motion.      Cervical back: Neck supple.   Skin:     General: Skin is warm and dry.   Neurological:      Mental Status: She is alert and oriented to person, place, and time.   Psychiatric:         Behavior: Behavior normal.         Thought Content: Thought content normal.         Judgment: Judgment normal.       Coag Studies Interp Report (09/01/2021 16:30)  Reticulocytes (09/01/2021 16:30)  Folate (09/01/2021 16:30)  Vitamin B12 (09/01/2021 16:30)  Iron Profile (09/01/2021 16:30)  Ferritin (09/01/2021 16:30)  Thrombin Time (09/01/2021 16:30)  Fibrinogen (09/01/2021 16:30)  Protime-INR (09/01/2021 16:30)  aPTT (09/01/2021 16:30)  Comprehensive Metabolic Panel (09/01/2021 16:30)  Von Willebrand Panel (09/01/2021 16:30)  Platelet Function Test (09/01/2021 16:30)  CBC & Differential (09/01/2021 15:11)  TSH (08/13/2021 09:40)    Assessment/Plan   Diagnoses and all orders for this visit:    1. Bruising (Primary)-patient's initial coagulation studies revealed prolonged bleeding times.  Patient's recent coagulation studies revealed bleeding " time is improved.  Coagulation profile fails to reveal problems with excessive bleeding.  Patient requested consultation with a different hematologist.  She will give me the name of somebody that she would like to see.    2. Acquired hypothyroidism-patient's TSH is stable.    3. Bipolar 1 disorder, mixed (HCC)-patient has stable at this time.  She has to be tapered off of her Seroquel.  Patient states that her environment is totally different than the past.  Patient states that psychiatric diagnosis came from unhealthy living arrangement with her mother.  She states that her mother has Munchhausen syndrome by proxy.  Patient now is totally  from her mother and family.  Patient is going to decrease the dose of the Seroquel by 50 mg a day on a weekly basis.  The patient was advised to call if she has increased repetitive thinking anxiousness or demian.    4. Hx of migraine headaches      Patient Instructions   Decrease the dose of the seroquel as instructed.    Continue other medications and treatment.    Follow-up in the office in 6 months or sooner if needed.      Erlin Allred Jr., MD    10/15/21

## 2021-10-25 RX ORDER — LEVOTHYROXINE SODIUM 0.05 MG/1
50 TABLET ORAL DAILY
Qty: 30 TABLET | Refills: 5 | Status: SHIPPED | OUTPATIENT
Start: 2021-10-25 | End: 2021-12-27 | Stop reason: SDUPTHER

## 2021-10-25 NOTE — TELEPHONE ENCOUNTER
Caller: Krupa Concepcion    Relationship: Self    Requested Prescriptions:   Requested Prescriptions     Pending Prescriptions Disp Refills   • levothyroxine (SYNTHROID, LEVOTHROID) 50 MCG tablet 30 tablet 5     Sig: Take 1 tablet by mouth Daily.      Pharmacy where request should be sent: [facility]/pharmacy #[id number] [facility] [address] [place] [address] RD. AT [facility] [phone] [facility] [phone] FX

## 2021-11-13 ENCOUNTER — DOCUMENTATION (OUTPATIENT)
Dept: FAMILY MEDICINE CLINIC | Facility: CLINIC | Age: 36
End: 2021-11-13

## 2021-11-13 RX ORDER — QUETIAPINE FUMARATE 50 MG/1
TABLET, FILM COATED ORAL
Qty: 42 TABLET | Refills: 0 | Status: SHIPPED | OUTPATIENT
Start: 2021-11-13 | End: 2021-12-27 | Stop reason: SDUPTHER

## 2021-11-13 NOTE — PROGRESS NOTES
The patient called and stated that since being off of her Seroquel she has been quicker to anger and holds grudges longer.  The patient states she would like to go back on Seroquel 50 mg once a day.  I agreed with her and I sent a prescription to her pharmacy.

## 2021-11-28 RX ORDER — QUETIAPINE FUMARATE 50 MG/1
TABLET, FILM COATED ORAL
Qty: 42 TABLET | Refills: 0 | OUTPATIENT
Start: 2021-11-28

## 2021-12-20 ENCOUNTER — TELEPHONE (OUTPATIENT)
Dept: FAMILY MEDICINE CLINIC | Facility: CLINIC | Age: 36
End: 2021-12-20

## 2021-12-20 NOTE — TELEPHONE ENCOUNTER
Pt called stating her job is about to require the covid vaccine to work there. She stated due to hx of anaphylaxis she does not wish to get vaccine at job site and would rather get it in office. I informed her we do not have vaccine in office and she can go to local pharmacy for vaccine and they will monitor her for reaction post injection. Pt states she wants to get vaccine in medical facility in case she has reaction, I again informed pt we do not have vaccine in office and she will have to get vaccine at another facility and should be seen in ED or [facility] if she has reaction to vaccine.

## 2021-12-27 ENCOUNTER — TELEPHONE (OUTPATIENT)
Dept: FAMILY MEDICINE CLINIC | Facility: CLINIC | Age: 36
End: 2021-12-27

## 2021-12-27 RX ORDER — QUETIAPINE FUMARATE 50 MG/1
50 TABLET, FILM COATED ORAL NIGHTLY
Qty: 90 TABLET | Refills: 1 | Status: SHIPPED | OUTPATIENT
Start: 2021-12-27 | End: 2021-12-28 | Stop reason: SDUPTHER

## 2021-12-27 RX ORDER — LEVOTHYROXINE SODIUM 0.05 MG/1
50 TABLET ORAL DAILY
Qty: 90 TABLET | Refills: 1 | Status: SHIPPED | OUTPATIENT
Start: 2021-12-27 | End: 2021-12-28 | Stop reason: SDUPTHER

## 2021-12-27 RX ORDER — LEVOTHYROXINE SODIUM 0.05 MG/1
50 TABLET ORAL DAILY
Qty: 90 TABLET | Refills: 1 | Status: SHIPPED | OUTPATIENT
Start: 2021-12-27 | End: 2021-12-27 | Stop reason: SDUPTHER

## 2021-12-27 RX ORDER — QUETIAPINE FUMARATE 50 MG/1
TABLET, FILM COATED ORAL
Qty: 42 TABLET | Refills: 0 | Status: CANCELLED | OUTPATIENT
Start: 2021-12-27

## 2021-12-27 RX ORDER — QUETIAPINE FUMARATE 50 MG/1
50 TABLET, FILM COATED ORAL NIGHTLY
Qty: 90 TABLET | Refills: 1 | Status: SHIPPED | OUTPATIENT
Start: 2021-12-27 | End: 2021-12-27 | Stop reason: SDUPTHER

## 2021-12-27 NOTE — TELEPHONE ENCOUNTER
Caller: Krupa Concepcion    Relationship: Self    Best call back number: [phone]    What medication are you requesting: QUEtiapine (SEROquel) 50 MG tablet  AND  levothyroxine (SYNTHROID, LEVOTHROID) 50 MCG tablet  90 DAY SUPPLY WITH 2 REFILLS.  PATIENT CURRENT WEIGHT  POUNDS.    If a prescription is needed, what is your preferred pharmacy and phone number: [facility]/PHARMACY #[id number] - [facility] [address] [place] [address] RD. AT [facility] [phone] [facility] [phone]      Additional notes:  PATIENT WOULD LIKE TO HAVE A 90 DAY SUPPLY ON BOTH MEDICATIONS, BUT SHE ONLY HAS 3 DAY SUPPLY LEFT ON THE  QUETIAPINE.

## 2021-12-28 RX ORDER — QUETIAPINE FUMARATE 50 MG/1
50 TABLET, FILM COATED ORAL NIGHTLY
Qty: 90 TABLET | Refills: 1 | Status: SHIPPED | OUTPATIENT
Start: 2021-12-28 | End: 2022-01-21 | Stop reason: SDUPTHER

## 2021-12-28 RX ORDER — LEVOTHYROXINE SODIUM 0.05 MG/1
50 TABLET ORAL DAILY
Qty: 90 TABLET | Refills: 1 | Status: SHIPPED | OUTPATIENT
Start: 2021-12-28 | End: 2022-03-21 | Stop reason: SDUPTHER

## 2022-01-22 RX ORDER — QUETIAPINE FUMARATE 50 MG/1
50 TABLET, FILM COATED ORAL NIGHTLY
Qty: 90 TABLET | Refills: 1 | Status: SHIPPED | OUTPATIENT
Start: 2022-01-22 | End: 2022-03-21 | Stop reason: SDUPTHER

## 2022-03-21 RX ORDER — QUETIAPINE FUMARATE 50 MG/1
50 TABLET, FILM COATED ORAL NIGHTLY
Qty: 90 TABLET | Refills: 0 | Status: SHIPPED | OUTPATIENT
Start: 2022-03-21 | End: 2022-10-10

## 2022-03-21 RX ORDER — LEVOTHYROXINE SODIUM 0.05 MG/1
50 TABLET ORAL DAILY
Qty: 90 TABLET | Refills: 0 | Status: SHIPPED | OUTPATIENT
Start: 2022-03-21 | End: 2022-05-10 | Stop reason: SDUPTHER

## 2022-04-14 ENCOUNTER — TELEPHONE (OUTPATIENT)
Dept: ONCOLOGY | Facility: CLINIC | Age: 37
End: 2022-04-14

## 2022-04-14 ENCOUNTER — OFFICE VISIT (OUTPATIENT)
Dept: FAMILY MEDICINE CLINIC | Facility: CLINIC | Age: 37
End: 2022-04-14

## 2022-04-14 VITALS
DIASTOLIC BLOOD PRESSURE: 78 MMHG | OXYGEN SATURATION: 100 % | BODY MASS INDEX: 23.99 KG/M2 | HEIGHT: 59 IN | SYSTOLIC BLOOD PRESSURE: 112 MMHG | WEIGHT: 119 LBS | TEMPERATURE: 97.1 F | HEART RATE: 85 BPM

## 2022-04-14 DIAGNOSIS — E03.9 ACQUIRED HYPOTHYROIDISM: Chronic | ICD-10-CM

## 2022-04-14 DIAGNOSIS — F31.60 BIPOLAR 1 DISORDER, MIXED: Chronic | ICD-10-CM

## 2022-04-14 DIAGNOSIS — N92.0 MENORRHAGIA WITH REGULAR CYCLE: Chronic | ICD-10-CM

## 2022-04-14 DIAGNOSIS — R89.9 ABNORMAL LABORATORY TEST: Chronic | ICD-10-CM

## 2022-04-14 DIAGNOSIS — T14.8XXA BRUISING: Chronic | ICD-10-CM

## 2022-04-14 DIAGNOSIS — Z01.419 GYNECOLOGIC EXAM NORMAL: Primary | ICD-10-CM

## 2022-04-14 DIAGNOSIS — Z86.69 HX OF MIGRAINE HEADACHES: Chronic | ICD-10-CM

## 2022-04-14 DIAGNOSIS — Z04.81: ICD-10-CM

## 2022-04-14 PROCEDURE — 99214 OFFICE O/P EST MOD 30 MIN: CPT | Performed by: NURSE PRACTITIONER
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
## 2022-04-19 ENCOUNTER — TELEPHONE (OUTPATIENT)
Dept: ONCOLOGY | Facility: CLINIC | Age: 37
End: 2022-04-19

## 2022-04-22 ENCOUNTER — TELEPHONE (OUTPATIENT)
Dept: ONCOLOGY | Facility: CLINIC | Age: 37
End: 2022-04-22

## 2022-05-09 ENCOUNTER — OFFICE VISIT (OUTPATIENT)
Dept: FAMILY MEDICINE CLINIC | Facility: CLINIC | Age: 37
End: 2022-05-09

## 2022-05-09 ENCOUNTER — LAB (OUTPATIENT)
Dept: LAB | Facility: HOSPITAL | Age: 37
End: 2022-05-09

## 2022-05-09 VITALS
TEMPERATURE: 96.9 F | HEIGHT: 59 IN | WEIGHT: 132 LBS | HEART RATE: 82 BPM | SYSTOLIC BLOOD PRESSURE: 111 MMHG | BODY MASS INDEX: 26.61 KG/M2 | OXYGEN SATURATION: 100 % | DIASTOLIC BLOOD PRESSURE: 66 MMHG

## 2022-05-09 DIAGNOSIS — N92.1 MENORRHAGIA WITH IRREGULAR CYCLE: ICD-10-CM

## 2022-05-09 DIAGNOSIS — E03.9 ACQUIRED HYPOTHYROIDISM: Chronic | ICD-10-CM

## 2022-05-09 DIAGNOSIS — Z20.2 EXPOSURE TO STD: Primary | ICD-10-CM

## 2022-05-09 DIAGNOSIS — F31.60 BIPOLAR 1 DISORDER, MIXED: Chronic | ICD-10-CM

## 2022-05-09 DIAGNOSIS — N92.0 MENORRHAGIA WITH REGULAR CYCLE: ICD-10-CM

## 2022-05-09 DIAGNOSIS — Z20.2 EXPOSURE TO STD: ICD-10-CM

## 2022-05-09 LAB
ANISOCYTOSIS BLD QL: ABNORMAL
BASOPHILS # BLD MANUAL: 0.11 10*3/MM3 (ref 0–0.2)
BASOPHILS NFR BLD MANUAL: 2 % (ref 0–1.5)
BURR CELLS BLD QL SMEAR: ABNORMAL
C TRACH RRNA CVX QL NAA+PROBE: NOT DETECTED
DEPRECATED RDW RBC AUTO: 53.9 FL (ref 37–54)
ELLIPTOCYTES BLD QL SMEAR: ABNORMAL
EOSINOPHIL # BLD MANUAL: 0.06 10*3/MM3 (ref 0–0.4)
EOSINOPHIL NFR BLD MANUAL: 1 % (ref 0.3–6.2)
ERYTHROCYTE [DISTWIDTH] IN BLOOD BY AUTOMATED COUNT: 16.3 % (ref 12.3–15.4)
FERRITIN SERPL-MCNC: 34.4 NG/ML (ref 13–150)
FOLATE SERPL-MCNC: 12 NG/ML (ref 4.78–24.2)
HAV IGM SERPL QL IA: NORMAL
HBV CORE IGM SERPL QL IA: NORMAL
HBV SURFACE AG SERPL QL IA: NORMAL
HCT VFR BLD AUTO: 34.8 % (ref 34–46.6)
HCV AB SER DONR QL: NORMAL
HGB BLD-MCNC: 11.6 G/DL (ref 12–15.9)
HIV1+2 AB SER QL: NORMAL
IRON 24H UR-MRATE: 62 MCG/DL (ref 37–145)
LYMPHOCYTES # BLD MANUAL: 0.74 10*3/MM3 (ref 0.7–3.1)
LYMPHOCYTES NFR BLD MANUAL: 3 % (ref 5–12)
MCH RBC QN AUTO: 30.1 PG (ref 26.6–33)
MCHC RBC AUTO-ENTMCNC: 33.3 G/DL (ref 31.5–35.7)
MCV RBC AUTO: 90.4 FL (ref 79–97)
MICROCYTES BLD QL: ABNORMAL
MONOCYTES # BLD: 0.17 10*3/MM3 (ref 0.1–0.9)
N GONORRHOEA RRNA SPEC QL NAA+PROBE: NOT DETECTED
NEUTROPHILS # BLD AUTO: 4.58 10*3/MM3 (ref 1.7–7)
NEUTROPHILS NFR BLD MANUAL: 80.8 % (ref 42.7–76)
PLAT MORPH BLD: NORMAL
PLATELET # BLD AUTO: 234 10*3/MM3 (ref 140–450)
PMV BLD AUTO: 12.4 FL (ref 6–12)
POIKILOCYTOSIS BLD QL SMEAR: ABNORMAL
RBC # BLD AUTO: 3.85 10*6/MM3 (ref 3.77–5.28)
RETICS # AUTO: 0.21 10*6/MM3 (ref 0.02–0.13)
RETICS/RBC NFR AUTO: 5.54 % (ref 0.7–1.9)
RPR SER QL: NORMAL
SPHEROCYTES BLD QL SMEAR: ABNORMAL
T4 FREE SERPL-MCNC: 0.98 NG/DL (ref 0.93–1.7)
TSH SERPL DL<=0.05 MIU/L-ACNC: 1.47 UIU/ML (ref 0.27–4.2)
VARIANT LYMPHS NFR BLD MANUAL: 13.1 % (ref 19.6–45.3)
VIT B12 BLD-MCNC: 375 PG/ML (ref 211–946)
WBC MORPH BLD: NORMAL
WBC NRBC COR # BLD: 5.67 10*3/MM3 (ref 3.4–10.8)

## 2022-05-09 PROCEDURE — 85045 AUTOMATED RETICULOCYTE COUNT: CPT

## 2022-05-09 PROCEDURE — 84443 ASSAY THYROID STIM HORMONE: CPT

## 2022-05-09 PROCEDURE — 84439 ASSAY OF FREE THYROXINE: CPT

## 2022-05-09 PROCEDURE — 87491 CHLMYD TRACH DNA AMP PROBE: CPT

## 2022-05-09 PROCEDURE — G0432 EIA HIV-1/HIV-2 SCREEN: HCPCS

## 2022-05-09 PROCEDURE — 83540 ASSAY OF IRON: CPT

## 2022-05-09 PROCEDURE — 86592 SYPHILIS TEST NON-TREP QUAL: CPT

## 2022-05-09 PROCEDURE — 82746 ASSAY OF FOLIC ACID SERUM: CPT | Performed by: NURSE PRACTITIONER

## 2022-05-09 PROCEDURE — 85025 COMPLETE CBC W/AUTO DIFF WBC: CPT

## 2022-05-09 PROCEDURE — 80074 ACUTE HEPATITIS PANEL: CPT

## 2022-05-09 PROCEDURE — 99214 OFFICE O/P EST MOD 30 MIN: CPT | Performed by: FAMILY MEDICINE

## 2022-05-09 PROCEDURE — 87591 N.GONORRHOEAE DNA AMP PROB: CPT

## 2022-05-09 PROCEDURE — 82728 ASSAY OF FERRITIN: CPT

## 2022-05-09 PROCEDURE — 82607 VITAMIN B-12: CPT | Performed by: NURSE PRACTITIONER

## 2022-05-09 PROCEDURE — 85007 BL SMEAR W/DIFF WBC COUNT: CPT

## 2022-05-09 PROCEDURE — 36415 COLL VENOUS BLD VENIPUNCTURE: CPT | Performed by: NURSE PRACTITIONER

## 2022-05-09 RX ORDER — RISPERIDONE 3 MG/1
3 TABLET ORAL 2 TIMES DAILY
COMMUNITY
End: 2022-05-10 | Stop reason: SDUPTHER

## 2022-05-09 NOTE — PATIENT INSTRUCTIONS
Have the follow up labs done and call for results.    Continue your current medications and treatment.    Follow up in the office in 6 weeks.
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
## 2022-05-10 ENCOUNTER — TELEPHONE (OUTPATIENT)
Dept: FAMILY MEDICINE CLINIC | Facility: CLINIC | Age: 37
End: 2022-05-10

## 2022-05-10 RX ORDER — LEVOTHYROXINE SODIUM 0.05 MG/1
50 TABLET ORAL DAILY
Qty: 90 TABLET | Refills: 1 | Status: SHIPPED | OUTPATIENT
Start: 2022-05-10 | End: 2022-10-10 | Stop reason: SDUPTHER

## 2022-05-10 RX ORDER — RISPERIDONE 3 MG/1
3 TABLET ORAL 2 TIMES DAILY
Qty: 60 TABLET | Refills: 0 | Status: SHIPPED | OUTPATIENT
Start: 2022-05-10 | End: 2022-06-20

## 2022-05-10 NOTE — TELEPHONE ENCOUNTER
I returned the call.  There is no answer or option to leave a message.  Studies do not reveal evidence of sexually transmitted disease.  Patient does have elevated reticulocyte count.  She has an appointment to see hematology which she should keep.

## 2022-05-10 NOTE — TELEPHONE ENCOUNTER
Caller: Krupa Concepcion    Relationship: Self    Best call back number: [phone]    Requested Prescriptions:   Requested Prescriptions     Pending Prescriptions Disp Refills   • topiramate (TOPAMAX) 200 MG tablet 90 tablet 0     Sig: Take 1 tablet by mouth every night at bedtime.   • risperiDONE (risperDAL) 3 MG tablet       Sig: Take 1 tablet by mouth 2 (Two) Times a Day. HAS NOT STARTED YET AS OF 5/9/22- PRESCRIBED FROM [facility]   • levothyroxine (SYNTHROID, LEVOTHROID) 50 MCG tablet 90 tablet 0     Sig: Take 1 tablet by mouth Daily.   • Melatonin 5 MG capsule          Pharmacy where request should be sent: [facility]/PHARMACY #[id number] - [facility] [place] - [address] [place] [address] [phone] [facility] [phone] FX     Additional details provided by patient: PATIENT REQUESTS THAT MEDICATIONS BE SENT IN TO NEW PHARMACY. NEEDS TO MAKE SURE THEY ARE SENT TO THE [facility]    Does the patient have less than a 3 day supply:  [x] Yes  [] No    Vicente Reynoso Rep   05/10/22 13:41 EDT

## 2022-05-10 NOTE — TELEPHONE ENCOUNTER
Caller: Krupa Concepcion    Relationship: Self    Best call back number: [phone]  OR  [phone]    What test was performed: STD PANEL, RPR, URINALYSIS    When was the test performed: 5/9/22    Where was the test performed: [facility]    Additional notes:PATIENT REQUESTS A CALL BACK WITH TESTS RESULTS WHEN THEY ARE AVAILABLE.

## 2022-05-11 ENCOUNTER — TELEPHONE (OUTPATIENT)
Dept: ONCOLOGY | Facility: CLINIC | Age: 37
End: 2022-05-11

## 2022-05-11 NOTE — TELEPHONE ENCOUNTER
ATTEMPTED TO CONTACT PATIENT REGARDING MD F/U APPT.    NO ANSWER.  VM NOT SET UP.  SPOKE WITH PATIENT'S MOTHER.  SHE STATES SHE WILL HAVE PATIENT CALL ME.

## 2022-05-20 ENCOUNTER — TELEPHONE (OUTPATIENT)
Dept: FAMILY MEDICINE CLINIC | Facility: CLINIC | Age: 37
End: 2022-05-20

## 2022-05-20 NOTE — TELEPHONE ENCOUNTER
Caller: PARMJIT    Relationship: [employer]    Best call back number: [phone]    Who are you requesting to speak with (clinical staff, provider,  specific staff member): DR FRANCISCO OR MA    What was the call regarding: PARMJIT WITH WITH [employer] CALLED TO INFORM DR FRANCISCO THAT PATIENT WAS ADMITTED ON 5/18/22 TO [facility] FOR PSYCHIATRIC CARE

## 2022-05-26 ENCOUNTER — LAB (OUTPATIENT)
Dept: LAB | Facility: HOSPITAL | Age: 37
End: 2022-05-26

## 2022-05-26 ENCOUNTER — DOCUMENTATION (OUTPATIENT)
Dept: ONCOLOGY | Facility: HOSPITAL | Age: 37
End: 2022-05-26

## 2022-05-26 ENCOUNTER — OFFICE VISIT (OUTPATIENT)
Dept: ONCOLOGY | Facility: CLINIC | Age: 37
End: 2022-05-26

## 2022-05-26 VITALS
OXYGEN SATURATION: 99 % | HEIGHT: 59 IN | HEART RATE: 83 BPM | WEIGHT: 132.8 LBS | RESPIRATION RATE: 18 BRPM | SYSTOLIC BLOOD PRESSURE: 112 MMHG | BODY MASS INDEX: 26.77 KG/M2 | DIASTOLIC BLOOD PRESSURE: 77 MMHG | TEMPERATURE: 97.3 F

## 2022-05-26 DIAGNOSIS — D69.1 PLATELET FUNCTION DEFECT: Primary | ICD-10-CM

## 2022-05-26 DIAGNOSIS — D69.1 PLATELET FUNCTION DEFECT: ICD-10-CM

## 2022-05-26 LAB
BASOPHILS # BLD AUTO: 0.04 10*3/MM3 (ref 0–0.2)
BASOPHILS NFR BLD AUTO: 0.4 % (ref 0–1.5)
DEPRECATED RDW RBC AUTO: 48.4 FL (ref 37–54)
EOSINOPHIL # BLD AUTO: 0.09 10*3/MM3 (ref 0–0.4)
EOSINOPHIL NFR BLD AUTO: 1 % (ref 0.3–6.2)
ERYTHROCYTE [DISTWIDTH] IN BLOOD BY AUTOMATED COUNT: 15.8 % (ref 12.3–15.4)
HCT VFR BLD AUTO: 37.4 % (ref 34–46.6)
HGB BLD-MCNC: 13.1 G/DL (ref 12–15.9)
LYMPHOCYTES # BLD AUTO: 1.87 10*3/MM3 (ref 0.7–3.1)
LYMPHOCYTES NFR BLD AUTO: 20.3 % (ref 19.6–45.3)
MCH RBC QN AUTO: 29.6 PG (ref 26.6–33)
MCHC RBC AUTO-ENTMCNC: 35 G/DL (ref 31.5–35.7)
MCV RBC AUTO: 84.6 FL (ref 79–97)
MONOCYTES # BLD AUTO: 0.61 10*3/MM3 (ref 0.1–0.9)
MONOCYTES NFR BLD AUTO: 6.6 % (ref 5–12)
NEUTROPHILS NFR BLD AUTO: 6.62 10*3/MM3 (ref 1.7–7)
NEUTROPHILS NFR BLD AUTO: 71.7 % (ref 42.7–76)
PLATELET # BLD AUTO: 250 10*3/MM3 (ref 140–450)
PMV BLD AUTO: 11.2 FL (ref 6–12)
RBC # BLD AUTO: 4.42 10*6/MM3 (ref 3.77–5.28)
WBC NRBC COR # BLD: 9.23 10*3/MM3 (ref 3.4–10.8)

## 2022-05-26 PROCEDURE — 85025 COMPLETE CBC W/AUTO DIFF WBC: CPT

## 2022-05-26 PROCEDURE — 99214 OFFICE O/P EST MOD 30 MIN: CPT | Performed by: INTERNAL MEDICINE

## 2022-05-26 RX ORDER — FLUOXETINE HYDROCHLORIDE 40 MG/1
CAPSULE ORAL
COMMUNITY
Start: 2022-05-23 | End: 2022-10-10

## 2022-05-26 RX ORDER — HYDROXYZINE PAMOATE 25 MG/1
CAPSULE ORAL
COMMUNITY
Start: 2022-05-23 | End: 2022-10-10

## 2022-05-26 RX ORDER — TOPIRAMATE 100 MG/1
100 TABLET, FILM COATED ORAL 2 TIMES DAILY
COMMUNITY
Start: 2022-05-23 | End: 2022-10-10 | Stop reason: SDUPTHER

## 2022-05-26 NOTE — PROGRESS NOTES
Hematology/Oncology Outpatient Follow Up    PATIENT NAME:Krupa Concepcion  :1985  MRN: [id number]  PRIMARY CARE PHYSICIAN: Erlin Allred Jr., MD  REFERRING PHYSICIAN: Neli May APRN    No chief complaint on file.       HISTORY OF PRESENT ILLNESS:     This is a 36-year-old female who has been referred secondary to increased bruising.  Patient states that this has been going on for the past 6 months.  Mostly involves her arms and lower extremities.  She has been taking Motrin and Tylenol for body aches and pains which she experiences on her current job.  Over the past 3 weeks she has not needed any nonsteroidals and her bruising seems to be abating.  She occasionally will have gum bleeds, nosebleeds.  Denies GI bleed or blood in her urine.  She does have heavy menstrual cycles which last anywhere from 7 to 8 days.  Sometimes she has 2 cycles in a month.  She is currently established with Dr. CHANDA Alvarado with gynecology.     Her Pap smear is up-to-date.    · 2021 patient had repeat PFA-100 which was normal at 137 seconds range is 64 260, her CMP showed unremarkable results, PTT is normal at 25, PT is 10.3 and INR is less than 0.93, fibrinogen level is normal at 328 thrombin time is normal at 15.6, ferritin was 79, the  rest of her iron panel was essentially unremarkable, B12 was 413, folate was 19, reticulocyte count is 3.75.  Patient had a factor VIII activity which was elevated to 155% range is , von Willebrand antigen is normal at 126 and range is  and von Willebrand factor activity level is normal at 117% range is 5 to 200.  · 2021 her white count is 8.5, hemoglobin is 12.3, MCV is normal at 86 and platelets are 298.  Differentials are 53% neutrophils, 37% lymphocytes, there is no monocytosis, eosinophilia or basophilia.  Past Medical History:   Diagnosis Date   • Anxiety    • Arrhythmia    • Asthma    • Chest pain 2019   • Depression    • Disease of thyroid gland    • GERD  (gastroesophageal reflux disease)    • Migraine    • Schizoaffective disorder (HCC)        Past Surgical History:   Procedure Laterality Date   • CARDIAC CATHETERIZATION N/A 7/8/2020    Procedure: Left Heart Cath with Coronary Angiography;  Surgeon: Lilibeth Langley MD;  Location: [facility] CATH INVASIVE LOCATION;  Service: Cardiovascular;  Laterality: N/A;   • COLONOSCOPY     • DENTAL PROCEDURE     • EAR TUBES Bilateral     x2 times   • ENDOSCOPY     • TONSILLECTOMY           Current Outpatient Medications:   •  EPINEPHrine (EPIPEN) 0.3 MG/0.3ML solution auto-injector injection, Inject 0.3 mL under the skin into the appropriate area as directed 1 (One) Time As Needed (anaphylaxis)., Disp: 1 each, Rfl: 1  •  FLUoxetine (PROzac) 40 MG capsule, TAKE 1 CAPSULE BY MOUTH EVERY DAY FOR 30 DAYS, Disp: , Rfl:   •  hydrOXYzine pamoate (VISTARIL) 25 MG capsule, 1 CAP(S) ORALLY 3 TIMES A DAY X 30 DAYS AS NEEDED FOR ANXIETY, Disp: , Rfl:   •  levothyroxine (SYNTHROID, LEVOTHROID) 50 MCG tablet, Take 1 tablet by mouth Daily., Disp: 90 tablet, Rfl: 1  •  Melatonin 5 MG capsule, Take 5 mg by mouth Every Night., Disp: 30 each, Rfl: 0  •  PARAGARD INTRAUTERINE COPPER intrauterine device IUD, 1 each by Intrauterine route 1 (One) Time., Disp: , Rfl:   •  QUEtiapine (SEROquel) 50 MG tablet, Take 1 tablet by mouth Every Night., Disp: 90 tablet, Rfl: 0  •  topiramate (TOPAMAX) 100 MG tablet, Take 100 mg by mouth 2 (Two) Times a Day., Disp: , Rfl:   •  topiramate (TOPAMAX) 200 MG tablet, Take 1 tablet by mouth every night at bedtime., Disp: 90 tablet, Rfl: 0  •  risperiDONE (risperDAL) 3 MG tablet, Take 1 tablet by mouth 2 (Two) Times a Day. HAS NOT STARTED YET AS OF 5/9/22- PRESCRIBED FROM [facility], Disp: 60 tablet, Rfl: 0    Allergies   Allergen Reactions   • Latex Itching   • Parabens Anaphylaxis   • Tea Tree Oil Anaphylaxis   • Zolpidem Tartrate Mental Status Change   • Benzonatate Other (See Comments)   • Trazodone Other  "(See Comments)   • Cefdinir Hallucinations     hallucinations       Family History   Problem Relation Age of Onset   • Hypertension Mother    • Migraines Mother    • Osteoarthritis Mother    • Hyperlipidemia Father    • Hypertension Father    • Prostate cancer Father        Cancer-related family history includes Prostate cancer in her father.    Social History     Tobacco Use   • Smoking status: Never Smoker   • Smokeless tobacco: Never Used   Vaping Use   • Vaping Use: Never used   Substance Use Topics   • Alcohol use: Not Currently   • Drug use: No       HPI, ROS and PFSH have been reviewed and confirmed on 5/26/2022.     SUBJECTIVE:       patient comes in today without any specific complaints.  Or any specific need that she wants addressed.  Her thoughts were erratic.  She wanted to talk about advanced directives    REVIEW OF SYSTEMS:  Review of SystemsNo changes reported    OBJECTIVE:    Vitals:    05/26/22 1014   BP: 112/77   Pulse: 83   Resp: 18   Temp: 97.3 °F (36.3 °C)   SpO2: 99%   Weight: 60.2 kg (132 lb 12.8 oz)   Height: 149.9 cm (59\")   PainSc: 0-No pain     Body mass index is 26.82 kg/m².    ECOG  (0) Fully active, able to carry on all predisease performance without restriction    Physical Exam     No changes    RECENT LABS  WBC   Date Value Ref Range Status   05/26/2022 9.23 3.40 - 10.80 10*3/mm3 Final   04/26/2022 6.88 4.5 - 11.0 10*3/uL Final     RBC   Date Value Ref Range Status   05/26/2022 4.42 3.77 - 5.28 10*6/mm3 Final   04/26/2022 4.03 4.0 - 5.2 10*6/uL Final     Hemoglobin   Date Value Ref Range Status   05/26/2022 13.1 12.0 - 15.9 g/dL Final   04/26/2022 11.8 (L) 12.0 - 16.0 g/dL Final     Hematocrit   Date Value Ref Range Status   05/26/2022 37.4 34.0 - 46.6 % Final   04/26/2022 35.3 (L) 36.0 - 46.0 % Final     MCV   Date Value Ref Range Status   05/26/2022 84.6 79.0 - 97.0 fL Final   04/26/2022 87.6 80.0 - 100.0 fL Final     MCH   Date Value Ref Range Status   05/26/2022 29.6 26.6 - 33.0 " pg Final   04/26/2022 29.3 26.0 - 34.0 pg Final     MCHC   Date Value Ref Range Status   05/26/2022 35.0 31.5 - 35.7 g/dL Final   04/26/2022 33.4 31.0 - 37.0 g/dL Final     RDW   Date Value Ref Range Status   05/26/2022 15.8 (H) 12.3 - 15.4 % Final   04/26/2022 17.1 (H) 12.0 - 16.8 % Final     RDW-SD   Date Value Ref Range Status   05/26/2022 48.4 37.0 - 54.0 fl Final     MPV   Date Value Ref Range Status   05/26/2022 11.2 6.0 - 12.0 fL Final   04/26/2022 11.7 8.4 - 12.4 fL Final     Platelets   Date Value Ref Range Status   05/26/2022 250 140 - 450 10*3/mm3 Final   04/26/2022 238 140 - 440 10*3/uL Final     Neutrophil Rel %   Date Value Ref Range Status   04/26/2022 54.0 45 - 80 % Final     Neutrophil %   Date Value Ref Range Status   05/26/2022 71.7 42.7 - 76.0 % Final     Lymphocyte Rel %   Date Value Ref Range Status   04/26/2022 32.7 15 - 50 % Final     Lymphocyte %   Date Value Ref Range Status   05/26/2022 20.3 19.6 - 45.3 % Final     Monocyte Rel %   Date Value Ref Range Status   04/26/2022 10.3 0 - 15 % Final     Monocyte %   Date Value Ref Range Status   05/26/2022 6.6 5.0 - 12.0 % Final     Eosinophil %   Date Value Ref Range Status   05/26/2022 1.0 0.3 - 6.2 % Final   04/26/2022 1.9 0 - 7 % Final     Basophil Rel %   Date Value Ref Range Status   04/26/2022 0.7 0 - 2 % Final     Basophil %   Date Value Ref Range Status   05/26/2022 0.4 0.0 - 1.5 % Final     Immature Grans %   Date Value Ref Range Status   04/26/2022 0.4 0.0 - 1.0 % Final     Neutrophils Absolute   Date Value Ref Range Status   04/26/2022 3.71 2.0 - 8.8 10*3/uL Final     Neutrophils, Absolute   Date Value Ref Range Status   05/26/2022 6.62 1.70 - 7.00 10*3/mm3 Final     Lymphocytes Absolute   Date Value Ref Range Status   04/26/2022 2.25 0.7 - 5.5 10*3/uL Final     Lymphocytes, Absolute   Date Value Ref Range Status   05/26/2022 1.87 0.70 - 3.10 10*3/mm3 Final     Monocytes Absolute   Date Value Ref Range Status   04/26/2022 0.71 0.0 -  1.7 10*3/uL Final     Monocytes, Absolute   Date Value Ref Range Status   05/26/2022 0.61 0.10 - 0.90 10*3/mm3 Final     Eosinophils Absolute   Date Value Ref Range Status   04/26/2022 0.13 0.0 - 0.8 10*3/uL Final     Eosinophils, Absolute   Date Value Ref Range Status   05/26/2022 0.09 0.00 - 0.40 10*3/mm3 Final     Basophils Absolute   Date Value Ref Range Status   04/26/2022 0.05 0.0 - 0.2 10*3/uL Final     Basophils, Absolute   Date Value Ref Range Status   05/26/2022 0.04 0.00 - 0.20 10*3/mm3 Final     Immature Grans, Absolute   Date Value Ref Range Status   04/26/2022 0.03 0.00 - 0.10 10*3/uL Final     nRBC   Date Value Ref Range Status   04/26/2022 0 0 /100(WBC) Final   08/13/2021 0.0 0.0 - 0.2 /100 WBC Final       Lab Results   Component Value Date    GLUCOSE 65 09/01/2021    BUN 19 09/01/2021    CREATININE 0.87 09/01/2021    EGFRIFNONA 73 09/01/2021    BCR 21.8 09/01/2021    K 3.6 09/01/2021    CO2 22.0 09/01/2021    CALCIUM 8.9 09/01/2021    ALBUMIN 4.90 09/01/2021    LABIL2 1.4 02/02/2019    AST 14 09/01/2021    ALT 12 09/01/2021         ASSESSMENT:    Platelet function defect (CMS/[facility])  - CBC & Differential        1. History of schizoaffective disorder, patient actions not appropriate, having difficulty staying on subject during  conversation.  /counselor was consulted.  Please see notes.  Patient was sent by ambulance to [facility] for an evaluation.  2. Bruising likely secondary to nonsteroidal anti-inflammatory agents.  There is no family history of any bleeding diastasis.  Work-up so far is negative including a repeat PFA-100.  Patient was off Motrin at the time the repeat  was done and her result was normal.  Patient states that she continues to bruise significantly and I therefore recommended checking factor XIII assay as well as D-dimer level.  Patient declines additional blood testing.  She also states that she was asked to discontinue Zyrtec and we both reviewed  her records and there is no Zyrtec on file .  Explained to patient that I have not asked her to discontinue any of her medications at this time.  Patient went on to state that we are ordering a lot of tests just to bill her insurance company to pay out a lot of money.  Patient also states that she no longer wants to follow-up in our office moving forward.  She will follow up with her PCP Dr. Allred.  3. Normocytic anemia     Plans    · No further appointments were given                  I spent 30 total minutes, face-to-face, caring for Krupa today.  90% of this time involved counseling and/or coordination of care as documented within this note.

## 2022-05-26 NOTE — PROGRESS NOTES
"Case Management/ Note    Patient Name: Krupa Concepcion  YOB: 1985  MRN #: [id number]    OSW met with Krupa at the request of Dr. Arellano. She is clean, appropriately dressed, Speech is pressured and fast. Mood and affect is congruent. She is alert to name. She asked if the medications were given were placebos. She said, \"I feel the pharmacy made a mistake and gave me placebos because I can no longer decode\". She made several references to being, \"an excellent decoder\". She said she was a \"real tremayne\" and \"rules the world\". She spoke of having a weapon and when asked what she has she said, \"My weapon is that I am the covid\" followed by, \"I hold the secret to the covid\". She spoke of challenges with her  and three children but would not give their names, ages or how long she and her  have been .     She had an awareness that \"life was hard\" without her medication and was agreeable to going to [facility] for evaluation. She had an awareness that she needed her mental health medications. When the ambulance arrived, OSW asked to give report in front of her and she did not like that the term delusion had been used, and began to refuse to go to the ED. OSW redirected her back to her comments about life being hard and how she felt it best to go to the hospital. She was agreeable to going to the hospital again. OSW called and spoke with Ratna at [facility] giving report of the above.     Electronically signed by:   Kiki Capps LCSW, OSW-C  05/26/22, 16:14 EDT        "

## 2022-06-08 RX ORDER — MULTIVITAMIN/IRON/FOLIC ACID 18MG-0.4MG
TABLET ORAL
Qty: 30 TABLET | Refills: 0 | Status: SHIPPED | OUTPATIENT
Start: 2022-06-08 | End: 2022-10-10

## 2022-06-20 ENCOUNTER — OFFICE VISIT (OUTPATIENT)
Dept: FAMILY MEDICINE CLINIC | Facility: CLINIC | Age: 37
End: 2022-06-20

## 2022-06-20 VITALS
RESPIRATION RATE: 16 BRPM | HEIGHT: 59 IN | OXYGEN SATURATION: 99 % | SYSTOLIC BLOOD PRESSURE: 125 MMHG | HEART RATE: 110 BPM | BODY MASS INDEX: 27.42 KG/M2 | DIASTOLIC BLOOD PRESSURE: 82 MMHG | TEMPERATURE: 96.8 F | WEIGHT: 136 LBS

## 2022-06-20 DIAGNOSIS — N92.0 MENORRHAGIA WITH REGULAR CYCLE: Chronic | ICD-10-CM

## 2022-06-20 DIAGNOSIS — J30.9 ALLERGIC RHINITIS, UNSPECIFIED SEASONALITY, UNSPECIFIED TRIGGER: Chronic | ICD-10-CM

## 2022-06-20 DIAGNOSIS — E03.9 ACQUIRED HYPOTHYROIDISM: Chronic | ICD-10-CM

## 2022-06-20 DIAGNOSIS — K21.9 GASTROESOPHAGEAL REFLUX DISEASE WITHOUT ESOPHAGITIS: Chronic | ICD-10-CM

## 2022-06-20 DIAGNOSIS — F31.60 BIPOLAR 1 DISORDER, MIXED: Primary | Chronic | ICD-10-CM

## 2022-06-20 PROBLEM — Z01.419 GYNECOLOGIC EXAM NORMAL: Status: RESOLVED | Noted: 2022-04-14 | Resolved: 2022-06-20

## 2022-06-20 PROBLEM — T14.8XXA BRUISING: Status: RESOLVED | Noted: 2021-10-15 | Resolved: 2022-06-20

## 2022-06-20 PROBLEM — R89.9 ABNORMAL LABORATORY TEST: Chronic | Status: RESOLVED | Noted: 2022-04-14 | Resolved: 2022-06-20

## 2022-06-20 PROBLEM — M19.90 ARTHRITIS: Status: RESOLVED | Noted: 2020-12-29 | Resolved: 2022-06-20

## 2022-06-20 PROCEDURE — 99214 OFFICE O/P EST MOD 30 MIN: CPT | Performed by: FAMILY MEDICINE

## 2022-06-20 RX ORDER — CETIRIZINE HYDROCHLORIDE 5 MG/1
5 TABLET ORAL DAILY
COMMUNITY
End: 2022-10-10 | Stop reason: SDUPTHER

## 2022-06-20 NOTE — PROGRESS NOTES
"Subjective   Krupa Concepcion is a 37 y.o. female.     Chief Complaint   Patient presents with   • Menorrhagia     6 week follow up       HPI  Chief complaint: Bipolar disease menorrhagia hypothyroidism allergic rhinitis    The patient is a 37-year-old white female comes in for follow-up and maintenance of her current problems which include    1.  Bipolar disease-stable-patient since I saw her last has been working with psychiatry to get her bipolar disease under control.  Patient is on Prozac 40 mg daily injectable risperidone 12.5 mg every 14 days and quetiapine 50 mg nightly.  Patient states that she is feeling much better.  She states that she is living back with her boyfriend.  She states that her thought processes were delusional  And that they are improved now.  She sees psychiatry every 2 weeks.    2.  Hypothyroidism-stable-patient is currently on Synthroid 25 mg daily.  She denied heat or cold intolerance tremor weight gain or weight loss.    3.  Dysfunctional uterine bleeding-unchanged-the patient has a intrauterine device.  Patient continues to have frequent periods.  She had a normal pelvic and Pap here in the office recently.  Patient is scheduled to see her gynecologist soon.    4.  Migraine headaches-stable-patient is on Topamax 100 mg twice a day for headache prophylaxis.    5.  Allergic rhinitis-stable-patient on Zyrtec 5 mg at night.      The following portions of the patient's history were reviewed and updated as appropriate: allergies, current medications, past family history, past medical history, past social history, past surgical history and problem list.    Review of Systems    Objective     /82 (BP Location: Right arm, Patient Position: Sitting, Cuff Size: Adult)   Pulse 110   Temp 96.8 °F (36 °C) (Infrared)   Resp 16   Ht 149.9 cm (59\")   Wt 61.7 kg (136 lb)   SpO2 99%   BMI 27.47 kg/m²     Physical Exam  Vitals and nursing note reviewed.   Constitutional:       Appearance: She " is well-developed.   HENT:      Head: Normocephalic and atraumatic.   Eyes:      Pupils: Pupils are equal, round, and reactive to light.   Cardiovascular:      Rate and Rhythm: Normal rate and regular rhythm.      Pulses: Normal pulses.      Heart sounds: Normal heart sounds. No murmur heard.    No gallop.   Pulmonary:      Effort: Pulmonary effort is normal.      Breath sounds: Normal breath sounds.   Abdominal:      General: Bowel sounds are normal.      Palpations: Abdomen is soft.   Musculoskeletal:         General: Normal range of motion.      Cervical back: Neck supple.   Skin:     General: Skin is warm and dry.   Neurological:      Mental Status: She is alert and oriented to person, place, and time.   Psychiatric:         Behavior: Behavior normal.         Thought Content: Thought content normal.         Judgment: Judgment normal.           Assessment & Plan   Diagnoses and all orders for this visit:    1. Bipolar 1 disorder, mixed (HCC) (Primary)    2. Menorrhagia with regular cycle    3. Gastroesophageal reflux disease without esophagitis    4. Acquired hypothyroidism    5. Allergic rhinitis, unspecified seasonality, unspecified trigger    Other orders  -     risperiDONE Microspheres ER (risperDAL CONSTA) 12.5 MG injection; Inject 12.5 mg into the appropriate muscle as directed by prescriber Every 14 (Fourteen) Days.  Dispense: 4 each      Patient Instructions   Continue your current medications and treatment.    Follow up in the office in 3 months.    Laboratory testing at that time.      Erlin Allred Jr., MD    06/20/22

## 2022-07-05 ENCOUNTER — TELEPHONE (OUTPATIENT)
Dept: FAMILY MEDICINE CLINIC | Facility: CLINIC | Age: 37
End: 2022-07-05

## 2022-07-05 NOTE — TELEPHONE ENCOUNTER
I spoke with the patient and her significant other.  She is having worsening manic episodes.  I recommended that she have urgent psychiatric evaluation.

## 2022-07-07 NOTE — TELEPHONE ENCOUNTER
Caller: Jamey Krupa A    Relationship: Self    Best call back number: [phone]      Requested Prescriptions:   Requested Prescriptions     Pending Prescriptions Disp Refills   • topiramate (TOPAMAX) 200 MG tablet 90 tablet 0     Sig: Take 1 tablet by mouth every night at bedtime.   • risperiDONE Microspheres ER (risperDAL CONSTA) 12.5 MG injection 4 each      Sig: Inject 12.5 mg into the appropriate muscle as directed by prescriber Every 14 (Fourteen) Days.   • QUEtiapine (SEROquel) 50 MG tablet 90 tablet 0     Sig: Take 1 tablet by mouth Every Night.   • levothyroxine (SYNTHROID, LEVOTHROID) 50 MCG tablet 90 tablet 1     Sig: Take 1 tablet by mouth Daily.   • FLUoxetine (PROzac) 40 MG capsule     • hydrOXYzine pamoate (VISTARIL) 25 MG capsule     • melatonin ([facility] Melatonin) 5 MG tablet tablet 30 tablet 0        Pharmacy where request should be sent: [facility]/PHARMACY #[id number] - [facility] [place] [phone] [place] [phone] FX     Additional details provided by patient: PATIENT IS COMPLETELY OUT OF THIS MEDICATIONS.     Does the patient have less than a 3 day supply:  [x] Yes  [] No    APRIL Vicente NGUYEN   07/07/22 12:22 EDT

## 2022-07-08 RX ORDER — LEVOTHYROXINE SODIUM 0.05 MG/1
50 TABLET ORAL DAILY
Qty: 90 TABLET | Refills: 1 | OUTPATIENT
Start: 2022-07-08

## 2022-07-08 RX ORDER — CHOLECALCIFEROL (VITAMIN D3) 125 MCG
CAPSULE ORAL
Qty: 30 TABLET | Refills: 0 | OUTPATIENT
Start: 2022-07-08

## 2022-07-08 RX ORDER — HYDROXYZINE PAMOATE 25 MG/1
CAPSULE ORAL
OUTPATIENT
Start: 2022-07-08

## 2022-07-08 RX ORDER — FLUOXETINE HYDROCHLORIDE 40 MG/1
CAPSULE ORAL
OUTPATIENT
Start: 2022-07-08

## 2022-07-08 RX ORDER — QUETIAPINE FUMARATE 50 MG/1
50 TABLET, FILM COATED ORAL NIGHTLY
Qty: 90 TABLET | Refills: 0 | OUTPATIENT
Start: 2022-07-08

## 2022-09-02 ENCOUNTER — TELEPHONE (OUTPATIENT)
Dept: FAMILY MEDICINE CLINIC | Facility: CLINIC | Age: 37
End: 2022-09-02

## 2022-09-02 NOTE — TELEPHONE ENCOUNTER
Pt states she needs all of her meds refilled to [facility] on [facility] and [place]-  levothyroxine, topiramate, seroquel 200mg, zyrtec, melatonin 10mg, risperidone 2mg bid, stool softener prn, vit d prn, multivitamin, tylenol prn, prozac 60mg

## 2022-09-14 ENCOUNTER — TELEPHONE (OUTPATIENT)
Dept: FAMILY MEDICINE CLINIC | Facility: CLINIC | Age: 37
End: 2022-09-14

## 2022-09-14 NOTE — TELEPHONE ENCOUNTER
Kaveh,  from [facility] calling stating patient is currently in patient at their facility, has upper respiratory infection. Been treating with OTC meds, not helping. Requesting an rx for something to be sent to [place] [facility] at their facility. Questions, please call Kaveh @ [phone].

## 2022-10-10 ENCOUNTER — OFFICE VISIT (OUTPATIENT)
Dept: INTERNAL MEDICINE | Facility: CLINIC | Age: 37
End: 2022-10-10

## 2022-10-10 VITALS
OXYGEN SATURATION: 97 % | SYSTOLIC BLOOD PRESSURE: 100 MMHG | WEIGHT: 145.4 LBS | TEMPERATURE: 97.9 F | HEIGHT: 59 IN | BODY MASS INDEX: 29.31 KG/M2 | HEART RATE: 124 BPM | DIASTOLIC BLOOD PRESSURE: 80 MMHG

## 2022-10-10 DIAGNOSIS — J30.1 SEASONAL ALLERGIC RHINITIS DUE TO POLLEN: Chronic | ICD-10-CM

## 2022-10-10 DIAGNOSIS — H92.01 EARACHE ON RIGHT: ICD-10-CM

## 2022-10-10 DIAGNOSIS — Z00.00 ANNUAL PHYSICAL EXAM: ICD-10-CM

## 2022-10-10 DIAGNOSIS — E03.9 HYPOTHYROIDISM, UNSPECIFIED TYPE: Primary | Chronic | ICD-10-CM

## 2022-10-10 DIAGNOSIS — K21.9 GASTROESOPHAGEAL REFLUX DISEASE WITHOUT ESOPHAGITIS: Chronic | ICD-10-CM

## 2022-10-10 DIAGNOSIS — Z01.419 WELL WOMAN EXAM: ICD-10-CM

## 2022-10-10 DIAGNOSIS — F31.60 BIPOLAR 1 DISORDER, MIXED: Chronic | ICD-10-CM

## 2022-10-10 DIAGNOSIS — D22.9 ATYPICAL NEVI: ICD-10-CM

## 2022-10-10 DIAGNOSIS — Z86.69 HX OF MIGRAINE HEADACHES: Chronic | ICD-10-CM

## 2022-10-10 PROCEDURE — 99204 OFFICE O/P NEW MOD 45 MIN: CPT | Performed by: NURSE PRACTITIONER

## 2022-10-10 RX ORDER — FAMOTIDINE 20 MG/1
20 TABLET, FILM COATED ORAL 2 TIMES DAILY
Qty: 60 TABLET | Refills: 5 | Status: SHIPPED | OUTPATIENT
Start: 2022-10-10 | End: 2022-12-12

## 2022-10-10 RX ORDER — FLUTICASONE PROPIONATE 50 MCG
2 SPRAY, SUSPENSION (ML) NASAL DAILY
Qty: 16 G | Refills: 5 | Status: SHIPPED | OUTPATIENT
Start: 2022-10-10 | End: 2022-12-12

## 2022-10-10 RX ORDER — TOPIRAMATE 100 MG/1
100 TABLET, FILM COATED ORAL 3 TIMES DAILY
Qty: 90 TABLET | Refills: 0 | Status: CANCELLED | OUTPATIENT
Start: 2022-10-10

## 2022-10-10 RX ORDER — CETIRIZINE HYDROCHLORIDE 5 MG/1
5 TABLET ORAL DAILY
Qty: 90 TABLET | Refills: 1 | Status: SHIPPED | OUTPATIENT
Start: 2022-10-10 | End: 2023-03-09

## 2022-10-10 RX ORDER — LEVOTHYROXINE SODIUM 0.05 MG/1
50 TABLET ORAL DAILY
Qty: 90 TABLET | Refills: 1 | Status: SHIPPED | OUTPATIENT
Start: 2022-10-10 | End: 2023-03-27

## 2022-10-10 RX ORDER — TOPIRAMATE 100 MG/1
100 TABLET, FILM COATED ORAL 2 TIMES DAILY
Qty: 180 TABLET | Refills: 1 | Status: SHIPPED | OUTPATIENT
Start: 2022-10-10

## 2022-10-10 RX ORDER — DEXTROMETHORPHAN HBR, GUAIFENESIN 10; 100 MG/5ML; MG/5ML
LIQUID ORAL
COMMUNITY
Start: 2022-09-15 | End: 2022-10-12

## 2022-10-10 RX ORDER — QUETIAPINE FUMARATE 400 MG/1
800 TABLET, FILM COATED ORAL NIGHTLY
Qty: 180 TABLET | Refills: 0 | Status: CANCELLED | OUTPATIENT
Start: 2022-10-10

## 2022-10-10 RX ORDER — PSEUDOEPHEDRINE HCL 30 MG
100 TABLET ORAL
COMMUNITY
Start: 2022-08-19

## 2022-10-10 NOTE — PROGRESS NOTES
Chief Complaint  Establish Care (New patient, would like to discuss covid and flu shot. Medication refills and referral to mental health services. Gynocology referral. Bronchitis recently, would like right ear checked, she says its causing her some pain. Really wants labs checked. )  Subjective        History of Present Illness  Krupa Concepcion is a 37 y.o. female who presents to [facility] INTERNAL MEDICINE:     1.  To establish primary care. Relocated to the area from [place].    2.  For follow-up of hypothyroidism, migraines and allergies. She has complaint of right earache; was diagnosed with bronchitis 4 weeks ago and is still coughing. Symptoms overall have improved. Patient wants referrals for abnormal mole to left breast, psychiatry,  counseling and gynecology.    Review of Systems  Constitutional: Negative for fever and chills.   Lymphatic: Negative for painful or swollen nodes.   HEENT: Negative for sinus pain or sore throat.   Cardiovascular: Negative for chest pain, heart palpitations or peripheral edema.   Respiratory: Positive for cough. Negative for wheezing and dyspnea.  Gastrointestinal: Negative for nausea, vomiting and diarrhea.   Integumentary: Negative for rash.   Musculoskeletal: Negative for myalgia.       Past Medical History:   Diagnosis Date   • Anxiety    • Arrhythmia    • Asthma    • Chest pain 8/14/2019   • Depression    • Disease of thyroid gland    • GERD (gastroesophageal reflux disease)    • Migraine    • Schizoaffective disorder (HCC)         Past Surgical History:   Procedure Laterality Date   • CARDIAC CATHETERIZATION N/A 7/8/2020    Procedure: Left Heart Cath with Coronary Angiography;  Surgeon: Lilibeth Langley MD;  Location: [facility] INVASIVE LOCATION;  Service: Cardiovascular;  Laterality: N/A;   • COLONOSCOPY     • DENTAL PROCEDURE     • EAR TUBES Bilateral     x2 times   • ENDOSCOPY     • TONSILLECTOMY          Allergies   Allergen Reactions   • Latex  "Itching   • Parabens Anaphylaxis   • Tea Tree Oil Anaphylaxis   • Zolpidem Other (See Comments)   • Zolpidem Tartrate Mental Status Change   • Benzonatate Other (See Comments)   • Cromolyn Unknown - Low Severity   • Trazodone Other (See Comments)   • Cefdinir Hallucinations     hallucinations          Current Outpatient Medications:   •  cetirizine (zyrTEC) 5 MG tablet, Take 1 tablet by mouth Daily., Disp: 90 tablet, Rfl: 1  •  docusate sodium 100 MG capsule, 1 capsule., Disp: , Rfl:   •  Lawanda-Tussin DM  MG/5ML syrup, , Disp: , Rfl:   •  levothyroxine (SYNTHROID, LEVOTHROID) 50 MCG tablet, Take 1 tablet by mouth Daily., Disp: 90 tablet, Rfl: 1  •  PARAGARD INTRAUTERINE COPPER intrauterine device IUD, 1 each by Intrauterine route 1 (One) Time., Disp: , Rfl:   •  QUEtiapine Fumarate (SEROQUEL PO), Take 800 mg by mouth Every Night., Disp: , Rfl:   •  topiramate (TOPAMAX) 100 MG tablet, Take 1 tablet by mouth 2 (Two) Times a Day., Disp: 180 tablet, Rfl: 1  •  famotidine (Pepcid) 20 MG tablet, Take 1 tablet by mouth 2 (Two) Times a Day., Disp: 60 tablet, Rfl: 5  •  fluticasone (Flonase) 50 MCG/ACT nasal spray, 2 sprays into the nostril(s) as directed by provider Daily., Disp: 16 g, Rfl: 5    Objective   /80 (BP Location: Right arm, Patient Position: Sitting, Cuff Size: Large Adult)   Pulse (!) 124   Temp 97.9 °F (36.6 °C) (Temporal)   Ht 149.9 cm (59\")   Wt 66 kg (145 lb 6.4 oz)   SpO2 97%   BMI 29.37 kg/m²    Estimated body mass index is 29.37 kg/m² as calculated from the following:    Height as of this encounter: 149.9 cm (59\").    Weight as of this encounter: 66 kg (145 lb 6.4 oz).   Physical Exam  Vitals reviewed.   Constitutional:       General: She is not in acute distress.     Appearance: Normal appearance.   HENT:      Head: Normocephalic and atraumatic.      Right Ear: Tympanic membrane and ear canal normal.      Left Ear: Tympanic membrane and ear canal normal.   Eyes:      Conjunctiva/sclera: " Conjunctivae normal.   Cardiovascular:      Rate and Rhythm: Regular rhythm. Tachycardia present.      Heart sounds: Normal heart sounds. No murmur heard.  Pulmonary:      Effort: Pulmonary effort is normal.      Breath sounds: Normal breath sounds. No wheezing, rhonchi or rales.   Musculoskeletal:      Right lower leg: No edema.      Left lower leg: No edema.   Skin:     General: Skin is warm and dry.      Comments: Nevi with irregular borders to left breast   Neurological:      General: No focal deficit present.      Mental Status: She is alert.   Psychiatric:         Mood and Affect: Mood is anxious.         Speech: Speech normal.         Behavior: Behavior is cooperative.         Thought Content: Thought content normal. Thought content does not include suicidal ideation.        Result Review :                   Assessment and Plan    Diagnoses and all orders for this visit:    1. Hypothyroidism, unspecified type (Primary)  Comments:  Stable on levothyroxine 50 mcg daily and to continue.  Orders:  -     T4, Free; Future  -     TSH; Future    2. Hx of migraine headaches  Comments:  Stable on Topamax 100 mg BID and to continue.    3. Gastroesophageal reflux disease without esophagitis  Comments:  Pepcid 20 mg BID PRN.    4. Seasonal allergic rhinitis due to pollen  Comments:  Continue Zyrtec 5 mg daily.    5. Earache on right  Comments:  Reassurance no infection. Use flonase as directed and continue antihistamine.    6. Atypical nevi  -     Ambulatory Referral to Dermatology    7. Bipolar 1 disorder, mixed (HCC)  -     Ambulatory Referral to Psychology  -     Ambulatory Referral to Psychiatry    8. Well woman exam  -     Ambulatory Referral to Gynecology    9. Annual physical exam  Comments:  labs ordered  Orders:  -     Comprehensive Metabolic Panel; Future  -     CBC & Differential; Future  -     Lipid Panel; Future    Other orders  -     levothyroxine (SYNTHROID, LEVOTHROID) 50 MCG tablet; Take 1 tablet by mouth  Daily.  Dispense: 90 tablet; Refill: 1  -     famotidine (Pepcid) 20 MG tablet; Take 1 tablet by mouth 2 (Two) Times a Day.  Dispense: 60 tablet; Refill: 5  -     fluticasone (Flonase) 50 MCG/ACT nasal spray; 2 sprays into the nostril(s) as directed by provider Daily.  Dispense: 16 g; Refill: 5  -     topiramate (TOPAMAX) 100 MG tablet; Take 1 tablet by mouth 2 (Two) Times a Day.  Dispense: 180 tablet; Refill: 1  -     cetirizine (zyrTEC) 5 MG tablet; Take 1 tablet by mouth Daily.  Dispense: 90 tablet; Refill: 1      Follow Up     Patient was given instructions and counseling regarding her condition. Please see specific information pulled into the AVS if appropriate.   Return in about 6 months (around 4/10/2023) for Annual physical.    CHAPARRO Spring

## 2022-10-12 ENCOUNTER — OFFICE VISIT (OUTPATIENT)
Dept: OBSTETRICS AND GYNECOLOGY | Facility: CLINIC | Age: 37
End: 2022-10-12

## 2022-10-12 VITALS
BODY MASS INDEX: 28.88 KG/M2 | DIASTOLIC BLOOD PRESSURE: 79 MMHG | SYSTOLIC BLOOD PRESSURE: 114 MMHG | WEIGHT: 143 LBS | HEART RATE: 74 BPM

## 2022-10-12 DIAGNOSIS — Z01.419 WELL WOMAN EXAM WITH ROUTINE GYNECOLOGICAL EXAM: Primary | ICD-10-CM

## 2022-10-12 DIAGNOSIS — Z87.410 HISTORY OF CERVICAL DYSPLASIA: ICD-10-CM

## 2022-10-12 DIAGNOSIS — N92.0 MENORRHAGIA WITH REGULAR CYCLE: Chronic | ICD-10-CM

## 2022-10-12 PROCEDURE — G0123 SCREEN CERV/VAG THIN LAYER: HCPCS | Performed by: OBSTETRICS & GYNECOLOGY

## 2022-10-12 PROCEDURE — 99385 PREV VISIT NEW AGE 18-39: CPT | Performed by: OBSTETRICS & GYNECOLOGY

## 2022-10-12 PROCEDURE — 3008F BODY MASS INDEX DOCD: CPT | Performed by: OBSTETRICS & GYNECOLOGY

## 2022-10-12 PROCEDURE — 87624 HPV HI-RISK TYP POOLED RSLT: CPT | Performed by: OBSTETRICS & GYNECOLOGY

## 2022-10-12 PROCEDURE — 2014F MENTAL STATUS ASSESS: CPT | Performed by: OBSTETRICS & GYNECOLOGY

## 2022-10-12 NOTE — PROGRESS NOTES
Well Woman Visit    CC: Scheduled annual well gyn visit  Chief Complaint   Patient presents with   • Gynecologic Exam     Pt has concerns that her IUD (Paragard) is no longer in the right place due to her periods being irregular. Pt states she had her hormones checked at [facility] and they told her her hormones are low.        Myriad intake in the past?: No        Contraception:  Paragard IUD    HPI:   37 y.o.     Menses:   q 30 days, lasts 7 days, changes products q 3 hrs on heaviest days.     Pain:  Mild, OTC meds control discomfort    PCP: does manage PMHx and preventative labs  History: PMHx, Meds, Allergies, PSHx, Social Hx, and POBHx all reviewed and updated.    Pt has concerns she would like to discuss.    PHYSICAL EXAM:  /79   Pulse 74   Wt 64.9 kg (143 lb)   LMP 10/07/2022 (Exact Date)   Breastfeeding No   BMI 28.88 kg/m²  Not found.  General- NAD, alert and oriented, appropriate  Psych- Normal mood, good memory  Neck- No masses, no thyroid enlargement  CV- Regular rhythm, no murnurs  Resp- CTA to bases, no wheezes  Abdomen- Soft, non distended, non tender, no masses    Breast left-  Bilaterally symmetrical, no masses, non tender, no nipple discharge  Breast right- Bilaterally symmetrical, no masses, non tender, no nipple discharge    External genitalia- Normal female, no lesions  Urethra/meatus- Normal, no masses, non tender  Bladder- Normal, no masses, non tender  Vagina- Normal, no atrophy, no lesions, no discharge.  Prolapse: none  Cvx- Normal, no lesions, no discharge, No cervical motion tenderness, IUD strings visable Strings are short but visible at the cervical oscm  Uterus- Normal size, shape & consistency.  Non tender, mobile, & no prolapse  Adnexa- No mass, non tender  Anus/Rectum/Perineum- Not performed    Lymphatic- No palpable neck, axillary, or groin nodes  Ext- No edema, no cyanosis    Skin- No lesions, no rashes, no acanthosis nigricans      ASSESSMENT and PLAN:     Diagnoses and all orders for this visit:    1. Well woman exam with routine gynecological exam (Primary)    2. History of cervical dysplasia  Assessment & Plan:  Patient states she is previously had a LEEP procedure and then had a cold knife cone  States this was done in her early 20s  She is uncertain as to what level of dysplasia she had      3. Menorrhagia with regular cycle  Overview:  referred to gyn. will refer to hematology to complete original work up    Assessment & Plan:  Patient currently has a ParaGard IUD which has been in place for the last 3 years  She states for for the majority of the time she has menses monthly  She did have 1 episode of abnormal uterine bleeding for 3 weeks  States that has not happened since then  She does state that her mom went through early menopause in her early 40s  We discussed signs and symptoms of menopause and I recommend that the patient check and track her cycles and let me know if abnormal uterine bleeding returns        Preventative:  • BREAST HEALTH- Monthly self breast exam importance and how to reviewed. MMG and/or MRI (prn) reviewed per society guidelines and her individual history. Screen: Pt will call to schedule  • CERVICAL CANCER Screening- Reviewed current ASCCP guidelines for screening w and wo cotest HPV, age specific.  Screen: Updated today  • COLON CANCER Screening- Reviewed current medical society guidelines and options.  Screen:  Not medically needed  • Follow up PCP/Specialist PMHx and Labs    TRACK MENSES, RTO if <q21d, >7d long, heavy or painful.    She understands the importance of having any ordered tests to be performed in a timely fashion.  The risks of not performing them include, but are not limited to, advanced cancer stages, bone loss from osteoporosis and/or subsequent increase in morbidity and/or mortality.  She is encouraged to review her results online and/or contact or office if she has questions.     Follow Up:  Return in about 1  year (around 10/12/2023) for Annual physical.            Anne Gould MD  10/12/2022    [facility]  [facility] [place] [address]  Dept: [phone]  Dept Fax: [phone]  Loc: [phone]  Loc Fax: [phone]

## 2022-10-12 NOTE — ASSESSMENT & PLAN NOTE
Patient states she is previously had a LEEP procedure and then had a cold knife cone  States this was done in her early 20s  She is uncertain as to what level of dysplasia she had

## 2022-10-12 NOTE — ASSESSMENT & PLAN NOTE
Patient currently has a ParaGard IUD which has been in place for the last 3 years  She states for for the majority of the time she has menses monthly  She did have 1 episode of abnormal uterine bleeding for 3 weeks  States that has not happened since then  She does state that her mom went through early menopause in her early 40s  We discussed signs and symptoms of menopause and I recommend that the patient check and track her cycles and let me know if abnormal uterine bleeding returns

## 2022-10-18 LAB
CYTOLOGIST CVX/VAG CYTO: NORMAL
CYTOLOGY CVX/VAG DOC CYTO: NORMAL
CYTOLOGY CVX/VAG DOC THIN PREP: NORMAL
DX ICD CODE: NORMAL
HIV 1 & 2 AB SER-IMP: NORMAL
HPV I/H RISK 4 DNA CVX QL PROBE+SIG AMP: NEGATIVE
OTHER STN SPEC: NORMAL
STAT OF ADQ CVX/VAG CYTO-IMP: NORMAL

## 2022-10-19 ENCOUNTER — HOSPITAL ENCOUNTER (OUTPATIENT)
Dept: MAMMOGRAPHY | Facility: HOSPITAL | Age: 37
Discharge: HOME | End: 2022-10-19
Admitting: OBSTETRICS & GYNECOLOGY

## 2022-10-19 DIAGNOSIS — Z01.419 WELL WOMAN EXAM WITH ROUTINE GYNECOLOGICAL EXAM: ICD-10-CM

## 2022-10-19 PROCEDURE — 77063 BREAST TOMOSYNTHESIS BI: CPT

## 2022-10-19 PROCEDURE — 77067 SCR MAMMO BI INCL CAD: CPT

## 2022-10-20 ENCOUNTER — TELEPHONE (OUTPATIENT)
Dept: OBSTETRICS AND GYNECOLOGY | Facility: CLINIC | Age: 37
End: 2022-10-20

## 2022-10-20 NOTE — TELEPHONE ENCOUNTER
----- Message from Anne Gould MD sent at 10/19/2022  4:24 PM EDT -----  Orders are in epic for diagnostic imagine

## 2022-10-20 NOTE — TELEPHONE ENCOUNTER
Provider: DR OROPEZA  Caller: JONO SALAS  Relationship to Patient: SELF    Reason for Call: PT CALLED TO GET PAP RESULTS, AND CONFIRM IF MAMMO WILL NEED TO BE COMPLETED AGAIN.    ADVISED PT RESULTS WERE STILL PENDING. PLS CALL PT TO CONFIRM ONCE COMPLETED

## 2022-10-21 NOTE — TELEPHONE ENCOUNTER
Patient called and aware of her results. She understands the scheduling department will be in contact to set up the additional imaging.

## 2022-10-24 NOTE — TELEPHONE ENCOUNTER
Patient called back. We had already discuss her mammogram results and she has been able to review her pap results in her [facility] account.

## 2022-10-26 ENCOUNTER — TRANSCRIBE ORDERS (OUTPATIENT)
Dept: ADMINISTRATIVE | Facility: HOSPITAL | Age: 37
End: 2022-10-26

## 2022-10-26 ENCOUNTER — TELEPHONE (OUTPATIENT)
Dept: OBSTETRICS AND GYNECOLOGY | Facility: CLINIC | Age: 37
End: 2022-10-26

## 2022-10-26 DIAGNOSIS — N63.10 MASS OF RIGHT BREAST, UNSPECIFIED QUADRANT: Primary | ICD-10-CM

## 2022-10-26 DIAGNOSIS — N64.89 BREAST ASYMMETRY: Primary | ICD-10-CM

## 2022-10-26 NOTE — TELEPHONE ENCOUNTER
Please sign attached order for right breast ultrasound. The dx mmg order has already been placed and signed.

## 2022-11-10 ENCOUNTER — HOSPITAL ENCOUNTER (OUTPATIENT)
Dept: ULTRASOUND IMAGING | Facility: HOSPITAL | Age: 37
Discharge: HOME | End: 2022-11-10

## 2022-11-10 ENCOUNTER — HOSPITAL ENCOUNTER (OUTPATIENT)
Dept: MAMMOGRAPHY | Facility: HOSPITAL | Age: 37
Discharge: HOME | End: 2022-11-10

## 2022-11-10 DIAGNOSIS — N64.89 BREAST ASYMMETRY: ICD-10-CM

## 2022-11-10 DIAGNOSIS — N63.10 MASS OF RIGHT BREAST, UNSPECIFIED QUADRANT: ICD-10-CM

## 2022-11-10 PROCEDURE — 77065 DX MAMMO INCL CAD UNI: CPT

## 2022-11-10 PROCEDURE — G0279 TOMOSYNTHESIS, MAMMO: HCPCS

## 2022-11-10 PROCEDURE — 76642 ULTRASOUND BREAST LIMITED: CPT

## 2022-12-12 ENCOUNTER — OFFICE VISIT (OUTPATIENT)
Dept: OBSTETRICS AND GYNECOLOGY | Facility: CLINIC | Age: 37
End: 2022-12-12

## 2022-12-12 VITALS
WEIGHT: 147 LBS | HEART RATE: 99 BPM | HEIGHT: 59 IN | DIASTOLIC BLOOD PRESSURE: 89 MMHG | BODY MASS INDEX: 29.64 KG/M2 | SYSTOLIC BLOOD PRESSURE: 126 MMHG

## 2022-12-12 DIAGNOSIS — R30.0 DYSURIA: ICD-10-CM

## 2022-12-12 DIAGNOSIS — Z11.3 SCREEN FOR STD (SEXUALLY TRANSMITTED DISEASE): Primary | ICD-10-CM

## 2022-12-12 LAB
BILIRUB BLD-MCNC: NEGATIVE MG/DL
C TRACH RRNA CVX QL NAA+PROBE: NOT DETECTED
CANDIDA SPECIES: NEGATIVE
GARDNERELLA VAGINALIS: NEGATIVE
GLUCOSE UR STRIP-MCNC: NEGATIVE MG/DL
HBV SURFACE AG SERPL QL IA: NORMAL
HCV AB SER DONR QL: NORMAL
HIV1+2 AB SER QL: NORMAL
KETONES UR QL: NEGATIVE
LEUKOCYTE EST, POC: NEGATIVE
N GONORRHOEA RRNA SPEC QL NAA+PROBE: NOT DETECTED
NITRITE UR-MCNC: NEGATIVE MG/ML
PH UR: 5 [PH] (ref 5–8)
PROT UR STRIP-MCNC: NEGATIVE MG/DL
RBC # UR STRIP: NEGATIVE /UL
SP GR UR: 1.02 (ref 1–1.03)
T VAGINALIS DNA VAG QL PROBE+SIG AMP: NEGATIVE
UROBILINOGEN UR QL: NORMAL

## 2022-12-12 PROCEDURE — G0432 EIA HIV-1/HIV-2 SCREEN: HCPCS | Performed by: NURSE PRACTITIONER

## 2022-12-12 PROCEDURE — 87510 GARDNER VAG DNA DIR PROBE: CPT | Performed by: NURSE PRACTITIONER

## 2022-12-12 PROCEDURE — 87491 CHLMYD TRACH DNA AMP PROBE: CPT | Performed by: NURSE PRACTITIONER

## 2022-12-12 PROCEDURE — 86592 SYPHILIS TEST NON-TREP QUAL: CPT | Performed by: NURSE PRACTITIONER

## 2022-12-12 PROCEDURE — 86803 HEPATITIS C AB TEST: CPT | Performed by: NURSE PRACTITIONER

## 2022-12-12 PROCEDURE — 87591 N.GONORRHOEAE DNA AMP PROB: CPT | Performed by: NURSE PRACTITIONER

## 2022-12-12 PROCEDURE — 87480 CANDIDA DNA DIR PROBE: CPT | Performed by: NURSE PRACTITIONER

## 2022-12-12 PROCEDURE — 87660 TRICHOMONAS VAGIN DIR PROBE: CPT | Performed by: NURSE PRACTITIONER

## 2022-12-12 PROCEDURE — 87340 HEPATITIS B SURFACE AG IA: CPT | Performed by: NURSE PRACTITIONER

## 2022-12-12 PROCEDURE — 99214 OFFICE O/P EST MOD 30 MIN: CPT | Performed by: NURSE PRACTITIONER

## 2022-12-12 NOTE — PROGRESS NOTES
GYN Problem/Follow Up Visit    Chief Complaint   Patient presents with   • Follow-up     STD testing           HPI  Krupa Concepcion is a 37 y.o. female, , who presents for std screen, scant vaginal discharge, scant vaginal burn. Recent antibiotic use       Urinary frequency and burning with urination    Additional OB/GYN History   Patient's last menstrual period was 2022 (approximate).  Current contraception: contraceptive methods: IUD: paragard    Past Medical History:   Diagnosis Date   • Abnormal Pap smear of cervix    • Anxiety    • Arrhythmia    • Asthma    • Chest pain 2019   • Depression    • Disease of thyroid gland    • GERD (gastroesophageal reflux disease)    • HPV (human papilloma virus) infection    • Migraine    • Schizoaffective disorder (HCC)    • Trauma       Past Surgical History:   Procedure Laterality Date   • CARDIAC CATHETERIZATION N/A 2020    Procedure: Left Heart Cath with Coronary Angiography;  Surgeon: Lilibeth Langley MD;  Location: [facility] CATH INVASIVE LOCATION;  Service: Cardiovascular;  Laterality: N/A;   • CERVICAL CONIZATION, LEEP     • COLONOSCOPY     • DENTAL PROCEDURE     • EAR TUBES Bilateral     x2 times   • ENDOSCOPY     • TONSILLECTOMY        Family History   Problem Relation Age of Onset   • Hyperlipidemia Father    • Hypertension Father    • Prostate cancer Father    • Hypertension Mother    • Migraines Mother    • Osteoarthritis Mother    • Breast cancer Maternal Aunt    • Cervical cancer Maternal Aunt    • Ovarian cancer Neg Hx    • Uterine cancer Neg Hx    • Colon cancer Neg Hx    • Melanoma Neg Hx      Allergies as of 2022 - Reviewed 2022   Allergen Reaction Noted   • Tea tree oil Anaphylaxis 2012   • Zolpidem Other (See Comments) 2012   • Zolpidem tartrate Mental Status Change 2012   • Benzonatate Other (See Comments) 2016   • Cromolyn Unknown - Low Severity 2012   • Trazodone Other (See Comments)  "02/01/2016   • Cefdinir Hallucinations 11/12/2019      The additional following portions of the patient's history were reviewed and updated as appropriate: allergies, current medications, past family history, past medical history, past social history, past surgical history and problem list.    Review of Systems    I have reviewed and agree with the HPI, ROS, and historical information as entered above. Aretha Silver, APRN    Objective   /89   Pulse 99   Ht 149.9 cm (59\")   Wt 66.7 kg (147 lb)   LMP 11/29/2022 (Approximate)   BMI 29.69 kg/m²     Physical Exam  Vitals and nursing note reviewed. Exam conducted with a chaperone present.   Constitutional:       Appearance: Normal appearance.   Cardiovascular:      Rate and Rhythm: Normal rate.   Pulmonary:      Effort: Pulmonary effort is normal.   Abdominal:      Palpations: Abdomen is soft.   Genitourinary:     General: Normal vulva.      Vagina: Vaginal discharge (scant thin white) present.      Cervix: No friability, lesion (IUD string 2cm length) or erythema.      Uterus: Normal.       Adnexa: Right adnexa normal and left adnexa normal.   Lymphadenopathy:      Lower Body: No right inguinal adenopathy. No left inguinal adenopathy.   Skin:     General: Skin is warm and dry.   Neurological:      Mental Status: She is alert and oriented to person, place, and time.          Assessment and Plan    Diagnoses and all orders for this visit:    1. Screen for STD (sexually transmitted disease) (Primary)  -     Gardnerella vaginalis, Trichomonas vaginalis, Candida albicans, DNA - Swab, Vagina  -     Chlamydia trachomatis, Neisseria gonorrhoeae, PCR - Swab, Cervix  -     RPR, Rfx Qn RPR / Confirm TP  -     Hepatitis B Surface Antigen  -     Hepatitis C Antibody  -     HIV-1 / O / 2 Ag / Antibody 4th Generation    2. Dysuria  -     POC Urinalysis Dipstick      Color, UA   Date Value Ref Range Status   08/13/2021 Yellow Yellow, Straw Final     Appearance, UA   Date " Value Ref Range Status   08/13/2021 Clear Clear Final     Specific Gravity    Date Value Ref Range Status   12/12/2022 1.020 1.005 - 1.030 Final     pH, Urine   Date Value Ref Range Status   12/12/2022 5.0 5.0 - 8.0 Final     Leukocytes   Date Value Ref Range Status   12/12/2022 Negative Negative Final     Nitrite, UA   Date Value Ref Range Status   12/12/2022 Negative Negative Final     Protein, POC   Date Value Ref Range Status   12/12/2022 Negative Negative mg/dL Final     Glucose, UA   Date Value Ref Range Status   12/12/2022 Negative Negative mg/dL Final     Ketones, UA   Date Value Ref Range Status   12/12/2022 Negative Negative Final     Urobilinogen, UA   Date Value Ref Range Status   12/12/2022 Normal Normal, 0.2 E.U./dL Final     Bilirubin   Date Value Ref Range Status   12/12/2022 Negative Negative Final     Blood, UA   Date Value Ref Range Status   12/12/2022 Negative Negative Final     Lot Number   Date Value Ref Range Status   03/16/2020 [id number]  Final     Expiration Date   Date Value Ref Range Status   03/16/2020 12/31/2020  Final          She understands the importance of having the above orders performed in a timely fashion.  The risks of not performing them include, but are not limited to, cancer and/or subsequent increase in morbidity and/or mortality.  She is encouraged to review her results online and/or contact or office if she has questions.     Follow Up:  Return in about 1 year (around 12/12/2023).        CHAPARRO Panchal  12/12/2022

## 2022-12-14 LAB — RPR SER QL: NON REACTIVE

## 2023-01-25 ENCOUNTER — LAB (OUTPATIENT)
Dept: LAB | Facility: HOSPITAL | Age: 38
End: 2023-01-25
Payer: MEDICAID

## 2023-01-25 DIAGNOSIS — E03.9 HYPOTHYROIDISM, UNSPECIFIED TYPE: Chronic | ICD-10-CM

## 2023-01-25 DIAGNOSIS — Z00.00 ANNUAL PHYSICAL EXAM: ICD-10-CM

## 2023-01-25 LAB
ALBUMIN SERPL-MCNC: 5 G/DL (ref 3.5–5.2)
ALBUMIN/GLOB SERPL: 1.9 G/DL
ALP SERPL-CCNC: 64 U/L (ref 39–117)
ALT SERPL W P-5'-P-CCNC: 14 U/L (ref 1–33)
ANION GAP SERPL CALCULATED.3IONS-SCNC: 8 MMOL/L (ref 5–15)
AST SERPL-CCNC: 16 U/L (ref 1–32)
BASOPHILS # BLD AUTO: 0.04 10*3/MM3 (ref 0–0.2)
BASOPHILS NFR BLD AUTO: 0.7 % (ref 0–1.5)
BILIRUB SERPL-MCNC: 0.8 MG/DL (ref 0–1.2)
BUN SERPL-MCNC: 11 MG/DL (ref 6–20)
BUN/CREAT SERPL: 11.7 (ref 7–25)
CALCIUM SPEC-SCNC: 9.6 MG/DL (ref 8.6–10.5)
CHLORIDE SERPL-SCNC: 111 MMOL/L (ref 98–107)
CHOLEST SERPL-MCNC: 172 MG/DL (ref 0–200)
CO2 SERPL-SCNC: 22 MMOL/L (ref 22–29)
CREAT SERPL-MCNC: 0.94 MG/DL (ref 0.57–1)
DEPRECATED RDW RBC AUTO: 51.4 FL (ref 37–54)
EGFRCR SERPLBLD CKD-EPI 2021: 79.3 ML/MIN/1.73
EOSINOPHIL # BLD AUTO: 0.07 10*3/MM3 (ref 0–0.4)
EOSINOPHIL NFR BLD AUTO: 1.2 % (ref 0.3–6.2)
ERYTHROCYTE [DISTWIDTH] IN BLOOD BY AUTOMATED COUNT: 16.8 % (ref 12.3–15.4)
GLOBULIN UR ELPH-MCNC: 2.7 GM/DL
GLUCOSE SERPL-MCNC: 94 MG/DL (ref 65–99)
HCT VFR BLD AUTO: 40.4 % (ref 34–46.6)
HDLC SERPL-MCNC: 56 MG/DL (ref 40–60)
HGB BLD-MCNC: 14.3 G/DL (ref 12–15.9)
IMM GRANULOCYTES # BLD AUTO: 0.02 10*3/MM3 (ref 0–0.05)
IMM GRANULOCYTES NFR BLD AUTO: 0.4 % (ref 0–0.5)
LDLC SERPL CALC-MCNC: 105 MG/DL (ref 0–100)
LDLC/HDLC SERPL: 1.88 {RATIO}
LYMPHOCYTES # BLD AUTO: 1.44 10*3/MM3 (ref 0.7–3.1)
LYMPHOCYTES NFR BLD AUTO: 25.4 % (ref 19.6–45.3)
MCH RBC QN AUTO: 29.9 PG (ref 26.6–33)
MCHC RBC AUTO-ENTMCNC: 35.4 G/DL (ref 31.5–35.7)
MCV RBC AUTO: 84.5 FL (ref 79–97)
MONOCYTES # BLD AUTO: 0.41 10*3/MM3 (ref 0.1–0.9)
MONOCYTES NFR BLD AUTO: 7.2 % (ref 5–12)
NEUTROPHILS NFR BLD AUTO: 3.7 10*3/MM3 (ref 1.7–7)
NEUTROPHILS NFR BLD AUTO: 65.1 % (ref 42.7–76)
NRBC BLD AUTO-RTO: 0 /100 WBC (ref 0–0.2)
PLATELET # BLD AUTO: 264 10*3/MM3 (ref 140–450)
PMV BLD AUTO: 11.2 FL (ref 6–12)
POTASSIUM SERPL-SCNC: 3.9 MMOL/L (ref 3.5–5.2)
PROT SERPL-MCNC: 7.7 G/DL (ref 6–8.5)
RBC # BLD AUTO: 4.78 10*6/MM3 (ref 3.77–5.28)
SODIUM SERPL-SCNC: 141 MMOL/L (ref 136–145)
T4 FREE SERPL-MCNC: 1.22 NG/DL (ref 0.93–1.7)
TRIGL SERPL-MCNC: 55 MG/DL (ref 0–150)
TSH SERPL DL<=0.05 MIU/L-ACNC: 0.96 UIU/ML (ref 0.27–4.2)
VLDLC SERPL-MCNC: 11 MG/DL (ref 5–40)
WBC NRBC COR # BLD: 5.68 10*3/MM3 (ref 3.4–10.8)

## 2023-01-25 PROCEDURE — 84439 ASSAY OF FREE THYROXINE: CPT

## 2023-01-25 PROCEDURE — 80061 LIPID PANEL: CPT

## 2023-01-25 PROCEDURE — 36415 COLL VENOUS BLD VENIPUNCTURE: CPT

## 2023-01-25 PROCEDURE — 80050 GENERAL HEALTH PANEL: CPT

## 2023-01-27 ENCOUNTER — TELEPHONE (OUTPATIENT)
Dept: INTERNAL MEDICINE | Facility: CLINIC | Age: 38
End: 2023-01-27
Payer: MEDICAID

## 2023-03-09 RX ORDER — CETIRIZINE HYDROCHLORIDE 5 MG/1
5 TABLET ORAL DAILY
Qty: 90 TABLET | Refills: 1 | Status: SHIPPED | OUTPATIENT
Start: 2023-03-09

## 2023-03-14 ENCOUNTER — TRANSCRIBE ORDERS (OUTPATIENT)
Dept: ADMINISTRATIVE | Facility: HOSPITAL | Age: 38
End: 2023-03-14
Payer: MEDICAID

## 2023-03-14 DIAGNOSIS — Z12.31 SCREENING MAMMOGRAM FOR BREAST CANCER: Primary | ICD-10-CM

## 2023-03-27 RX ORDER — LEVOTHYROXINE SODIUM 0.05 MG/1
50 TABLET ORAL DAILY
Qty: 90 TABLET | Refills: 1 | Status: SHIPPED | OUTPATIENT
Start: 2023-03-27

## 2023-04-10 ENCOUNTER — OFFICE VISIT (OUTPATIENT)
Dept: INTERNAL MEDICINE | Facility: CLINIC | Age: 38
End: 2023-04-10
Payer: MEDICAID

## 2023-04-10 VITALS
BODY MASS INDEX: 30.52 KG/M2 | HEIGHT: 59 IN | HEART RATE: 135 BPM | OXYGEN SATURATION: 99 % | DIASTOLIC BLOOD PRESSURE: 70 MMHG | SYSTOLIC BLOOD PRESSURE: 100 MMHG | TEMPERATURE: 98.6 F | WEIGHT: 151.4 LBS

## 2023-04-10 DIAGNOSIS — Z86.69 HX OF MIGRAINE HEADACHES: ICD-10-CM

## 2023-04-10 DIAGNOSIS — J30.1 SEASONAL ALLERGIC RHINITIS DUE TO POLLEN: ICD-10-CM

## 2023-04-10 DIAGNOSIS — E03.9 HYPOTHYROIDISM, UNSPECIFIED TYPE: ICD-10-CM

## 2023-04-10 DIAGNOSIS — Z00.00 ANNUAL PHYSICAL EXAM: Primary | ICD-10-CM

## 2023-04-10 RX ORDER — LEVOTHYROXINE SODIUM 0.05 MG/1
50 TABLET ORAL DAILY
Qty: 90 TABLET | Refills: 1 | Status: SHIPPED | OUTPATIENT
Start: 2023-04-10 | End: 2023-04-17 | Stop reason: SDUPTHER

## 2023-04-10 RX ORDER — TOPIRAMATE 100 MG/1
100 TABLET, FILM COATED ORAL 2 TIMES DAILY
Qty: 180 TABLET | Refills: 1 | Status: SHIPPED | OUTPATIENT
Start: 2023-04-10

## 2023-04-10 RX ORDER — QUETIAPINE 200 MG/1
600 TABLET, FILM COATED, EXTENDED RELEASE ORAL NIGHTLY
COMMUNITY

## 2023-04-10 RX ORDER — CLONIDINE HYDROCHLORIDE 0.1 MG/1
0.1 TABLET ORAL
COMMUNITY
Start: 2023-03-27

## 2023-04-10 RX ORDER — CETIRIZINE HYDROCHLORIDE 5 MG/1
5 TABLET ORAL DAILY
Qty: 90 TABLET | Refills: 1 | Status: SHIPPED | OUTPATIENT
Start: 2023-04-10

## 2023-04-10 RX ORDER — QUETIAPINE FUMARATE 200 MG/1
800 TABLET, FILM COATED ORAL 3 TIMES DAILY
Qty: 360 TABLET | Refills: 0 | Status: CANCELLED | OUTPATIENT
Start: 2023-04-10

## 2023-04-10 RX ORDER — PSEUDOEPHEDRINE HCL 30 MG
100 TABLET ORAL DAILY
Qty: 90 CAPSULE | Refills: 1 | Status: SHIPPED | OUTPATIENT
Start: 2023-04-10

## 2023-04-10 NOTE — PROGRESS NOTES
Chief Complaint  Annual Exam (6 month physical. Needing refills. )  Subjective    History of Present Illness  Krupa Concepcion is a 38 y.o. female who presents to [facility] for:    1.  For annual physical exam.     2.  For follow-up of hypothyroidism, migraines and allergies.     Current Outpatient Medications:   •  cetirizine (zyrTEC) 5 MG tablet, Take 1 tablet by mouth Daily., Disp: 90 tablet, Rfl: 1  •  cloNIDine (CATAPRES) 0.1 MG tablet, Take 1 tablet by mouth every night at bedtime., Disp: , Rfl:   •  docusate sodium 100 MG capsule, Apply 1 capsule to cheek Daily., Disp: 90 capsule, Rfl: 1  •  levothyroxine (SYNTHROID, LEVOTHROID) 50 MCG tablet, Take 1 tablet by mouth Daily., Disp: 90 tablet, Rfl: 1  •  PARAGARD INTRAUTERINE COPPER intrauterine device IUD, 1 each by Intrauterine route 1 (One) Time., Disp: , Rfl:   •  QUEtiapine XR (SEROquel XR) 200 MG 24 hr tablet, Take 3 tablets by mouth Every Night., Disp: , Rfl:   •  topiramate (TOPAMAX) 100 MG tablet, Take 1 tablet by mouth 2 (Two) Times a Day., Disp: 180 tablet, Rfl: 1  Allergies   Allergen Reactions   • Tea Tree Oil Anaphylaxis   • Zolpidem Other (See Comments)   • Zolpidem Tartrate Mental Status Change   • Benzonatate Other (See Comments)   • Cromolyn Unknown - Low Severity   • Trazodone Other (See Comments)   • Cefdinir Hallucinations     hallucinations      Past Medical History:   Diagnosis Date   • Abnormal Pap smear of cervix    • Anxiety    • Arrhythmia    • Asthma    • Chest pain 08/14/2019   • Depression    • Disease of thyroid gland    • GERD (gastroesophageal reflux disease)    • HPV (human papilloma virus) infection    • Migraine    • Schizoaffective disorder    • Trauma       Past Surgical History:   Procedure Laterality Date   • CARDIAC CATHETERIZATION N/A 07/08/2020    Procedure: Left Heart Cath with Coronary Angiography;  Surgeon: Lilibeth Langley MD;  Location: [facility] CATH INVASIVE LOCATION;  Service:  "Cardiovascular;  Laterality: N/A;   • CERVICAL CONIZATION, LEEP     • COLONOSCOPY     • DENTAL PROCEDURE     • EAR TUBES Bilateral     x2 times   • ENDOSCOPY     • TONSILLECTOMY          Objective   /70 (BP Location: Right arm, Patient Position: Sitting, Cuff Size: Large Adult)   Pulse (!) 135   Temp 98.6 °F (37 °C) (Temporal)   Ht 149.9 cm (59\")   Wt 68.7 kg (151 lb 6.4 oz)   SpO2 99%   BMI 30.58 kg/m²    Estimated body mass index is 30.58 kg/m² as calculated from the following:    Height as of this encounter: 149.9 cm (59\").    Weight as of this encounter: 68.7 kg (151 lb 6.4 oz).     Physical Exam  Vitals reviewed.   Constitutional:       General: She is not in acute distress.  HENT:      Head: Normocephalic and atraumatic.      Right Ear: Tympanic membrane and ear canal normal.      Left Ear: Tympanic membrane and ear canal normal.   Eyes:      Conjunctiva/sclera: Conjunctivae normal.   Cardiovascular:      Rate and Rhythm: Normal rate and regular rhythm.      Heart sounds: Normal heart sounds. No murmur heard.  Pulmonary:      Effort: Pulmonary effort is normal.      Breath sounds: Normal breath sounds. No wheezing, rhonchi or rales.   Abdominal:      General: There is no distension.      Palpations: Abdomen is soft. There is no mass.      Tenderness: There is no abdominal tenderness.   Musculoskeletal:      Right lower leg: No edema.      Left lower leg: No edema.   Lymphadenopathy:      Cervical: No cervical adenopathy.   Skin:     General: Skin is warm and dry.      Coloration: Skin is not jaundiced or pale.   Neurological:      General: No focal deficit present.      Mental Status: She is alert.   Psychiatric:         Mood and Affect: Mood normal.         Thought Content: Thought content normal.          Result Review :  The following data was reviewed by: CHAPARRO Spring on 04/10/2023:  CMP    CMP 1/25/23   Glucose 94   BUN 11   Creatinine 0.94   eGFR 79.3   Sodium 141   Potassium 3.9 "   Chloride 111 (A)   Calcium 9.6   Total Protein 7.7   Albumin 5.0   Globulin 2.7   Total Bilirubin 0.8   Alkaline Phosphatase 64   AST (SGOT) 16   ALT (SGPT) 14   Albumin/Globulin Ratio 1.9   BUN/Creatinine Ratio 11.7   Anion Gap 8.0   (A) Abnormal value            CBC w/diff    CBC w/Diff 5/26/22 7/31/22 1/25/23   WBC 9.23 8.76 5.68   RBC 4.42 4.36 4.78   Hemoglobin 13.1 12.6 14.3   Hematocrit 37.4 35.8 (A) 40.4   MCV 84.6 82.1 84.5   MCH 29.6 28.9 29.9   MCHC 35.0 35.2 35.4   RDW 15.8 (A) 16.0 16.8 (A)   Platelets 250 259 264   Neutrophil Rel % 71.7 74.3 65.1   Immature Granulocyte Rel %  0.5 0.4   Lymphocyte Rel % 20.3 15.8 25.4   Monocyte Rel % 6.6 8.4 7.2   Eosinophil Rel % 1.0 0.5 1.2   Basophil Rel % 0.4 0.5 0.7   (A) Abnormal value            Lipid Panel    Lipid Panel 1/25/23   Total Cholesterol 172   Triglycerides 55   HDL Cholesterol 56   VLDL Cholesterol 11   LDL Cholesterol  105 (A)   LDL/HDL Ratio 1.88   (A) Abnormal value            TSH    TSH 5/9/22 1/25/23   TSH 1.470 0.963         T4, Free (01/25/2023 15:32)                Assessment and Plan   Diagnoses and all orders for this visit:    1. Annual physical exam (Primary)    2. Hypothyroidism, unspecified type  -     T4, Free; Future  -     TSH; Future  -     Comprehensive Metabolic Panel; Future    3. Hx of migraine headaches  -     Vitamin D,25-Hydroxy; Future  -     Comprehensive Metabolic Panel; Future    4. Seasonal allergic rhinitis due to pollen    Other orders  -     topiramate (TOPAMAX) 100 MG tablet; Take 1 tablet by mouth 2 (Two) Times a Day.  Dispense: 180 tablet; Refill: 1  -     cetirizine (zyrTEC) 5 MG tablet; Take 1 tablet by mouth Daily.  Dispense: 90 tablet; Refill: 1  -     docusate sodium 100 MG capsule; Apply 1 capsule to cheek Daily.  Dispense: 90 capsule; Refill: 1  -     levothyroxine (SYNTHROID, LEVOTHROID) 50 MCG tablet; Take 1 tablet by mouth Daily.  Dispense: 90 tablet; Refill: 1      Annual Wellness exam: Discussed  healthy diet, exercise, adequate sleep, cancer screening, immunizations and preventative care. Annual eye exam and twice yearly dental cleaning.    Hypothyroidism: Stable on levothyroxine 50 mcg daily and to continue.     Migraines: Stable on topiramate 200 mg BID and to continue.    Allergies: Stable on cetirizine 5 mg daily and to continue.     Takes clonidine 0.1 mg qhs for sleep and seroquel 600 mg qhs and seroquel 600 mg qhs give by psych.     Patient was given instructions and counseling regarding her condition or for health maintenance advice. Please see specific information pulled into the AVS if appropriate.     Follow Up   Return in about 6 months (around 10/10/2023) for Recheck.    CHAPARRO Spring

## 2023-04-13 NOTE — PROGRESS NOTES
GYN Problem/Follow Up Visit    Chief Complaint   Patient presents with   • Follow-up     Heavy Periods           HPI  Krupa Concepcion is a 38 y.o. female, , who presents for painful menstrual periods. Menses monthly, lasting 2-9 days, on heavy days change products every 1 hours, large blood clots.   Severe menstrual cramps for the past 2-3 years. Menstrual cramps last the duration of the menses. Untreated  Hx of  LEEP   Not sexually active for over 1 year, Paragard use for past 5 years  Chlamydia trachomatis, Neisseria gonorrhoeae, PCR - Swab, Cervix (2022 14:22)   RPR, Rfx Qn RPR / Confirm TP (2022 14:30)   Hepatitis B Surface Antigen (2022 14:30)   Hepatitis C Antibody (2022 14:30)   HIV-1 / O / 2 Ag / Antibody 4th Generation (2022 14:30)   Comprehensive Metabolic Panel (2023 15:32)   CBC & Differential (2023 15:32)   T4, Free (2023 15:32)   TSH (2023 15:32)   Hepatitis B Surface Antigen (2022 14:30)   PDF Report (10/12/2022 10:09)     Normal urination, normal bowel habits  Poor historian, unsure what, if any previous treatments attempted, over the counter remedies have proved ineffective in the pasts.  Declines hormone management of painful and heavy menstrual bleeding, adamant about removing uterus/hysterectomy, explained need to workup to determine etiology of AUB and painful menstrual cramping.      Additional OB/GYN History   Patient's last menstrual period was 2023 (approximate).  Current contraception: contraceptive methods: Paragard    Past Medical History:   Diagnosis Date   • Abnormal Pap smear of cervix    • Anxiety    • Arrhythmia    • Asthma    • Chest pain 2019   • Depression    • Disease of thyroid gland    • GERD (gastroesophageal reflux disease)    • HPV (human papilloma virus) infection    • Migraine without aura    • Schizoaffective disorder    • Trauma       Past Surgical History:   Procedure Laterality Date   •  "CARDIAC CATHETERIZATION N/A 07/08/2020    Procedure: Left Heart Cath with Coronary Angiography;  Surgeon: Lilibeth Langley MD;  Location: [facility] CATH INVASIVE LOCATION;  Service: Cardiovascular;  Laterality: N/A;   • CERVICAL CONIZATION, LEEP     • COLONOSCOPY     • DENTAL PROCEDURE     • EAR TUBES Bilateral     x2 times   • ENDOSCOPY     • TONSILLECTOMY        Family History   Problem Relation Age of Onset   • Hyperlipidemia Father    • Hypertension Father    • Prostate cancer Father    • Hypertension Mother    • Migraines Mother    • Osteoarthritis Mother    • Breast cancer Maternal Aunt    • Cervical cancer Maternal Aunt    • Ovarian cancer Neg Hx    • Uterine cancer Neg Hx    • Colon cancer Neg Hx    • Melanoma Neg Hx      Allergies as of 04/18/2023 - Reviewed 04/18/2023   Allergen Reaction Noted   • Tea tree oil Anaphylaxis 11/18/2012   • Zolpidem Other (See Comments) 05/20/2012   • Zolpidem tartrate Mental Status Change 05/20/2012   • Benzonatate Other (See Comments) 02/01/2016   • Cromolyn Unknown - Low Severity 05/20/2012   • Trazodone Other (See Comments) 02/01/2016   • Cefdinir Hallucinations 11/12/2019      The additional following portions of the patient's history were reviewed and updated as appropriate: allergies, current medications, past family history, past medical history, past social history, past surgical history and problem list.    Review of Systems    See HPI for pertinent ROS    Objective   /85   Pulse 91   Ht 149.9 cm (59\")   Wt 66.3 kg (146 lb 3.2 oz)   LMP 04/03/2023 (Approximate)   BMI 29.53 kg/m²     Physical Exam  Vitals and nursing note reviewed. Exam conducted with a chaperone present.   Constitutional:       Appearance: Normal appearance.   Cardiovascular:      Rate and Rhythm: Normal rate.   Pulmonary:      Effort: Pulmonary effort is normal.   Abdominal:      General: There is no distension.      Palpations: Abdomen is soft.      Tenderness: There is no right CVA " tenderness or left CVA tenderness.   Genitourinary:     General: Normal vulva.      Vagina: Normal.      Cervix: Normal.      Uterus: Normal.       Adnexa: Right adnexa normal and left adnexa normal.      Comments: IUD strings visible at opening of OS  Lymphadenopathy:      Lower Body: No right inguinal adenopathy. No left inguinal adenopathy.   Skin:     General: Skin is warm and dry.   Neurological:      Mental Status: She is alert and oriented to person, place, and time.          Assessment and Plan    Diagnoses and all orders for this visit:    1. Menorrhagia with regular cycle (Primary)  -     US Pelvis Transvaginal Non OB; Future    2. Dysmenorrhea  -     US Pelvis Transvaginal Non OB; Future    Counseling:  • Adamant regarding desires for hysterectomy now that she has caretaker for post op recovery. Declines hormone management of heavy and painful menstruation. Discussed recommendation for initial treatment with conservative management. Will obtain pelvic ultrasound and refer to MD to discuss surgical management if indicated.    Follow Up:  Return if symptoms worsen or fail to improve, for will call with resuts of pelvic ultrasound and follow up recommendations.        Aretha Silver, APRN  04/18/2023

## 2023-04-17 ENCOUNTER — TELEPHONE (OUTPATIENT)
Dept: INTERNAL MEDICINE | Facility: CLINIC | Age: 38
End: 2023-04-17
Payer: MEDICAID

## 2023-04-17 RX ORDER — LEVOTHYROXINE SODIUM 0.05 MG/1
50 TABLET ORAL DAILY
Qty: 90 TABLET | Refills: 1 | Status: SHIPPED | OUTPATIENT
Start: 2023-04-17

## 2023-04-17 NOTE — TELEPHONE ENCOUNTER
Caller: Krupa Concepcion    Relationship: Self    Best call back number: [phone]    Requested Prescriptions:   Requested Prescriptions     Pending Prescriptions Disp Refills   • levothyroxine (SYNTHROID, LEVOTHROID) 50 MCG tablet 90 tablet 1     Sig: Take 1 tablet by mouth Daily.        Pharmacy where request should be sent: [id number] - [place] [address] AT SEC OF  & [facility] [phone] [facility] [phone] FX     Last office visit with prescribing clinician: 4/10/2023   Last telemedicine visit with prescribing clinician: Visit date not found   Next office visit with prescribing clinician: 10/24/2023     Additional details provided by patient: PATIENT STATES THE PHARMACY SAID THEY NEED APPROVAL FOR THIS MEDICATION     Does the patient have less than a 3 day supply:  [] Yes  [x] No    Would you like a call back once the refill request has been completed: [] Yes [] No    If the office needs to give you a call back, can they leave a voicemail: [] Yes [] No    Abby Ayala, PCT   04/17/23 11:56 EDT

## 2023-04-18 ENCOUNTER — OFFICE VISIT (OUTPATIENT)
Dept: OBSTETRICS AND GYNECOLOGY | Facility: CLINIC | Age: 38
End: 2023-04-18
Payer: MEDICAID

## 2023-04-18 VITALS
HEART RATE: 91 BPM | HEIGHT: 59 IN | DIASTOLIC BLOOD PRESSURE: 85 MMHG | SYSTOLIC BLOOD PRESSURE: 131 MMHG | WEIGHT: 146.2 LBS | BODY MASS INDEX: 29.48 KG/M2

## 2023-04-18 DIAGNOSIS — N92.0 MENORRHAGIA WITH REGULAR CYCLE: Primary | ICD-10-CM

## 2023-04-18 DIAGNOSIS — N94.6 DYSMENORRHEA: ICD-10-CM

## 2023-04-18 RX ORDER — FAMOTIDINE 20 MG/1
1 TABLET, FILM COATED ORAL EVERY 12 HOURS SCHEDULED
COMMUNITY
Start: 2023-04-15

## 2023-04-27 ENCOUNTER — TELEPHONE (OUTPATIENT)
Dept: INTERNAL MEDICINE | Facility: CLINIC | Age: 38
End: 2023-04-27
Payer: MEDICAID

## 2023-04-27 RX ORDER — CETIRIZINE HYDROCHLORIDE 5 MG/1
5 TABLET ORAL DAILY
Qty: 30 TABLET | Refills: 0 | Status: SHIPPED | OUTPATIENT
Start: 2023-04-27

## 2023-04-27 RX ORDER — QUETIAPINE 200 MG/1
600 TABLET, FILM COATED, EXTENDED RELEASE ORAL NIGHTLY
Qty: 90 TABLET | Refills: 0 | Status: SHIPPED | OUTPATIENT
Start: 2023-04-27

## 2023-04-27 RX ORDER — PSEUDOEPHEDRINE HCL 30 MG
100 TABLET ORAL DAILY
Qty: 30 CAPSULE | Refills: 0 | Status: SHIPPED | OUTPATIENT
Start: 2023-04-27

## 2023-04-27 RX ORDER — COPPER 313.4 MG/1
1 INTRAUTERINE DEVICE INTRAUTERINE ONCE
Qty: 1 EACH | Refills: 0 | Status: CANCELLED | OUTPATIENT
Start: 2023-04-27 | End: 2023-04-27

## 2023-04-27 RX ORDER — FAMOTIDINE 20 MG/1
20 TABLET, FILM COATED ORAL EVERY 12 HOURS SCHEDULED
Qty: 60 TABLET | Refills: 0 | Status: SHIPPED | OUTPATIENT
Start: 2023-04-27

## 2023-04-27 RX ORDER — TOPIRAMATE 100 MG/1
100 TABLET, FILM COATED ORAL 2 TIMES DAILY
Qty: 60 TABLET | Refills: 0 | Status: SHIPPED | OUTPATIENT
Start: 2023-04-27

## 2023-04-27 RX ORDER — LEVOTHYROXINE SODIUM 0.05 MG/1
50 TABLET ORAL DAILY
Qty: 30 TABLET | Refills: 0 | Status: SHIPPED | OUTPATIENT
Start: 2023-04-27

## 2023-04-27 RX ORDER — CLONIDINE HYDROCHLORIDE 0.1 MG/1
0.1 TABLET ORAL
Qty: 30 TABLET | Refills: 0 | Status: SHIPPED | OUTPATIENT
Start: 2023-04-27

## 2023-04-27 NOTE — TELEPHONE ENCOUNTER
Caller: Krupa Concepcion    Relationship: Self    Best call back number: [phone]    Requested Prescriptions:   Requested Prescriptions     Pending Prescriptions Disp Refills   • cetirizine (zyrTEC) 5 MG tablet 90 tablet 1     Sig: Take 1 tablet by mouth Daily.   • cloNIDine (CATAPRES) 0.1 MG tablet       Sig: Take 1 tablet by mouth every night at bedtime.   • docusate sodium 100 MG capsule 90 capsule 1     Sig: Apply 1 capsule to cheek Daily.   • famotidine (PEPCID) 20 MG tablet       Sig: Take 1 tablet by mouth Every 12 (Twelve) Hours.   • levothyroxine (SYNTHROID, LEVOTHROID) 50 MCG tablet 90 tablet 1     Sig: Take 1 tablet by mouth Daily.   • Paragard Intrauterine Copper intrauterine device IUD 1 each 0     Si each by Intrauterine route 1 (One) Time for 1 dose.   • QUEtiapine XR (SEROquel XR) 200 MG 24 hr tablet       Sig: Take 3 tablets by mouth Every Night.   • topiramate (TOPAMAX) 100 MG tablet 180 tablet 1     Sig: Take 1 tablet by mouth 2 (Two) Times a Day.        Pharmacy where request should be sent: [facility] #[id number] - [place] [address] AT SEC OF  & [facility] [phone] [facility] [phone] FX     Last office visit with prescribing clinician: 4/10/2023   Last telemedicine visit with prescribing clinician: 10/24/2023   Next office visit with prescribing clinician: 10/24/2023     Additional details provided by patient: PATIENT CALLED IN STATING SHE HAS LOST HER MEDICATION WHILE TRAVELING AND IS ASKING FOR REFILLS. PATIENT STATES THAT SHE NEEDS AT LEAST AN EMERGENCY SUPPLY ASAP.    Does the patient have less than a 3 day supply:  [x] Yes  [] No    Would you like a call back once the refill request has been completed: [] Yes [] No    If the office needs to give you a call back, can they leave a voicemail: [] Yes [] No    Abby Ayala, PCT   23 08:22 EDT

## 2023-05-16 ENCOUNTER — HOSPITAL ENCOUNTER (OUTPATIENT)
Dept: ULTRASOUND IMAGING | Facility: HOSPITAL | Age: 38
Discharge: HOME | End: 2023-05-16
Admitting: NURSE PRACTITIONER
Payer: MEDICAID

## 2023-05-16 DIAGNOSIS — N92.0 MENORRHAGIA WITH REGULAR CYCLE: ICD-10-CM

## 2023-05-16 DIAGNOSIS — N94.6 DYSMENORRHEA: ICD-10-CM

## 2023-05-16 PROCEDURE — 76830 TRANSVAGINAL US NON-OB: CPT

## 2023-05-16 PROCEDURE — 76856 US EXAM PELVIC COMPLETE: CPT

## 2023-06-07 ENCOUNTER — TELEPHONE (OUTPATIENT)
Dept: INTERNAL MEDICINE | Facility: CLINIC | Age: 38
End: 2023-06-07
Payer: MEDICAID

## 2023-06-07 NOTE — TELEPHONE ENCOUNTER
Patient called office wanting to know lab results, patient went to [facility] to have labs, results are in care everywhere.

## 2023-06-12 NOTE — PROGRESS NOTES
"GYN Visit    Chief Complaint   Patient presents with   • Discuss Hysterectomy       HPI:   38 y.o. with LMP 5/15/23 with paragard IUD.  Menses q mo x 2-9 days with few days heavy.  Off and on cramping x 18 mos.  Has had IUD x 4-5 years. Pt with questions on safety of IUD with MRI, CT scan and other interventions.  Concerned may need hysterectomy.  Does not desire children.  Has had social issues in past to include bouts with homelessness. Currently stable and has help so if needs hysterectomy would like to get done.      History: PMHx, Meds, Allergies, PSHx, Social Hx, and POBHx all reviewed and updated.    PHYSICAL EXAM:  /73   Pulse 98   Ht 149.9 cm (59\")   Wt 64.4 kg (142 lb)   LMP 05/15/2023 (Within Days)   BMI 28.68 kg/m²   General- NAD, alert and oriented, appropriate  Psych- Normal mood, good memory        External genitalia- Normal female, no lesions  Urethra/meatus- Normal, no masses, non tender, no prolapse  Bladder- Normal, no masses, non tender, no prolapse  Vagina- Normal, no atrophy, no lesions, no discharge, no prolapse + dark blood in vault  Cvx- Normal, no lesions, no discharge, No cervical motion tenderness, IUD strings NOT visible  Uterus- Normal size, shape & consistency.  Non tender, mobile, & no prolapse  Adnexa- No mass, non tender  Anus/Rectum/Perineum- Not performed    Lymphatic- No palpable neck, axillary, or groin nodes  Ext- No edema, no cyanosis    Skin- No lesions, no rashes, no acanthosis nigricans      ASSESSMENT AND PLAN:  Diagnoses and all orders for this visit:    1. Encounter for female sterilization procedure (Primary) with removal IUD  -     Case Request; Standing  -     Case Request   The patient was counseled on the risks, benefits and alternatives of BTL.  Risks reviewed, but are not limited to: anesthesia, bleeding, transfusion, infection, damage to organs, bowels, bladder, other internal organs requiring additional surgery to repair or remove any damaged " structures.  Possible reoperation, wound separation, blood clots, and death.  Specifically with BTL, she understands it can fail and failure rates are usually less 1/250- 1/300.  If it does fail, she understands she is at higher risk for ectopic or tubal pregnancy and will need evaluation immediately with a positive pregnancy test.   She also understands approximately 10% of women will notice a heavier and more uncomfortable periods. She understands the procedure is meant to be permanent with no guarantee for future fertility after re-anastamosis.  She understands the increased costs associated with tubal reversal and possible IVF. She declines any non-permanent or non-surgical contraception options to include those with similar efficacy and no surgical risk.  All her questions have been answered to her satisfaction and she desires to proceed. Specifically with salpingectomy, she understands it is not reversible and she would require in vitro fertilization (IVF) to conceive in the future. There is a small possibility it could affect the blood flow to her ovaries and therefore cause earlier menopause.  We have discussed if her intra-operative findings indicate that removal her fallopian tubes would not be recommended, she DOES desire tubal occlusion.  We also discussed it is safe to have MRI and CT scan with IUD. Her questions were answered to her satisfaction and she desires to proceed.  •   •     Follow Up:  No follow-ups on file.          Aye Vasquez DO  06/13/2023    [facility]  [address] [place] [address]  Dept: [phone]  Dept Fax: [phone]  Loc: [phone]  Loc Fax: [phone]

## 2023-06-13 ENCOUNTER — OFFICE VISIT (OUTPATIENT)
Dept: OBSTETRICS AND GYNECOLOGY | Facility: CLINIC | Age: 38
End: 2023-06-13
Payer: MEDICAID

## 2023-06-13 VITALS
WEIGHT: 142 LBS | SYSTOLIC BLOOD PRESSURE: 110 MMHG | DIASTOLIC BLOOD PRESSURE: 73 MMHG | HEART RATE: 98 BPM | HEIGHT: 59 IN | BODY MASS INDEX: 28.63 KG/M2

## 2023-06-13 DIAGNOSIS — Z30.2 ENCOUNTER FOR FEMALE STERILIZATION PROCEDURE: Primary | ICD-10-CM

## 2023-06-13 PROCEDURE — 1159F MED LIST DOCD IN RCRD: CPT | Performed by: OBSTETRICS & GYNECOLOGY

## 2023-06-13 PROCEDURE — 1160F RVW MEDS BY RX/DR IN RCRD: CPT | Performed by: OBSTETRICS & GYNECOLOGY

## 2023-06-13 PROCEDURE — 99214 OFFICE O/P EST MOD 30 MIN: CPT | Performed by: OBSTETRICS & GYNECOLOGY

## 2023-06-13 RX ORDER — FLUTICASONE PROPIONATE 50 MCG
SPRAY, SUSPENSION (ML) NASAL
COMMUNITY
Start: 2023-06-04

## 2023-07-27 NOTE — TELEPHONE ENCOUNTER
Left v/m asking Pt to call so we may tatiana from Ref w/ either Deedee or Naeem   VTE Assessment already completed for this visit

## 2023-08-11 RX ORDER — DOCUSATE SODIUM 100 MG/1
100 CAPSULE, LIQUID FILLED ORAL
COMMUNITY

## 2023-08-11 RX ORDER — MELATONIN 10 MG
CAPSULE ORAL
COMMUNITY
End: 2023-08-21

## 2023-08-11 RX ORDER — MULTIPLE VITAMINS W/ MINERALS TAB 9MG-400MCG
1 TAB ORAL DAILY
COMMUNITY

## 2023-08-11 NOTE — PRE-PROCEDURE INSTRUCTIONS
IMPORTANT INSTRUCTIONS - PRE-ADMISSION TESTING  DO NOT EAT OR CHEW anything after midnight the night before your procedure.    You may have CLEAR liquids up to ______ hours prior to ARRIVAL time. PER DR VOSS CAN HAVE GATORADE UP TO 3 HOURS PRIOR TO ARRIVAL. NO RED  Take the following medications the morning of your procedure with JUST A SIP OF WATER:  _________ZYRTEC, PEPCID, FLONASE, SYNTHROID, TOPAMAX______________________________________________________________________________________________________________________________________________________________________________    DO NOT BRING your medications to the hospital with you, UNLESS something has changed since your PRE-Admission Testing appointment.  Hold all vitamins, supplements, and NSAIDS (Non- steroidal anti-inflammatory meds) for one week prior to surgery (you MAY take Tylenol or Acetaminophen).  If you are diabetic, check your blood sugar the morning of your procedure. If it is less than 70 or if you are feeling symptomatic, call the following number for further instructions: [phone]  Use your inhalers/nebulizers as usual, the morning of your procedure. BRING YOUR INHALERS with you.   Bring your CPAP or BIPAP to hospital, ONLY IF YOU WILL BE SPENDING THE NIGHT.   Make sure you have a ride home and have someone who will stay with you the day of your procedure after you go home.  If you have any questions, please call your Pre-Admission Testing Nurse, ____ERNIE____________ at [phone]____________.   Per anesthesia request, do not smoke for 24 hours before your procedure or as instructed by your surgeon.    BATHING INSTRUCTIONS GIVEN. NO JEWELRY DAY OF PROCEDURE. NO NAIL POLISH UPPER OR LOWER EXTREMITIES.  ENTRANCE A ELEVATOR A 3RD FLOOR DAY OF PROCEDURE  CASH,CHECK, OR CARD FOR MEDS TO BED IF INDICATED AT DISCHARGE

## 2023-08-14 NOTE — H&P
[facility]   HISTORY AND PHYSICAL    Patient Name:Krupa Concepcion  : 1985  MRN: [id number]  Primary Care Physician: Imelda Greenwood APRN  Date of admission: (Not on file)    Subjective   Subjective     Chief Complaint: here for sterilization and IUD remval.    History of Present Illness   Krupa Concepcion is a 38 y.o. female  with LMP 7/15/23 with paragard IUD.  Menses q mo x 2-9 days with few days heavy.  Off and on cramping x 18 mos.  Has had IUD x 4-5 years. Pt with questions on safety of IUD with MRI, CT scan and other interventions.   Does not desire children.  Has had social issues in past to include bouts with homelessness. Currently stable and has help so would like to get surgery done.     Review of Systems   Constitutional: Negative.    HENT: Negative.     Eyes: Negative.    Respiratory: Negative.     Cardiovascular: Negative.    Gastrointestinal: Negative.    Endocrine: Negative.    Genitourinary:  Positive for menstrual problem.   Musculoskeletal: Negative.    Skin: Negative.    Allergic/Immunologic: Negative.    Neurological: Negative.    Hematological: Negative.    Psychiatric/Behavioral: Negative.         Personal History     Past Medical History:   Diagnosis Date    Abnormal Pap smear of cervix     Anesthesia     SLOW TO WAKE UP AND IS EMOTIONAL    Anxiety     Arrhythmia     Asthma     AS A CHILD    Chest pain 2019    HX OF CHEST PAIN. SAW DR GARCIA IN  PT REPORTS HAD CARDIAC TESTING AND WAS RELEASED. FOLLOWED BY PCP. DENIES CP/SOA AND IS ACTIVE    Depression     Disease of thyroid gland     GERD (gastroesophageal reflux disease)     HPV (human papilloma virus) infection     Migraine without aura     Schizoaffective disorder     REPORTS IS BEING EVALUATED    Trauma        Past Surgical History:   Procedure Laterality Date    BREAST BIOPSY Left     (-)    CARDIAC CATHETERIZATION N/A 2020    Procedure: Left Heart Cath with Coronary Angiography;  Surgeon: Korin  MD Lilibeth;  Location: [facility] INVASIVE LOCATION;  Service: Cardiovascular;  Laterality: N/A;    CERVICAL CONIZATION, LEEP      COLONOSCOPY      DENTAL PROCEDURE      EAR TUBES Bilateral     x2 times    ENDOSCOPY      TONSILLECTOMY         Family History: Her family history includes Breast cancer in her maternal aunt; Cervical cancer in her maternal aunt; Hyperlipidemia in her father; Hypertension in her father and mother; Migraines in her mother; Osteoarthritis in her mother; Prostate cancer in her father.     Social History: She  reports that she has never smoked. She has never used smokeless tobacco. She reports that she does not currently use alcohol. She reports that she does not use drugs.    Home Medications:  Melatonin, Paragard Intrauterine Copper, QUEtiapine XR, cetirizine, cloNIDine, docusate sodium, famotidine, fluticasone, levothyroxine, multivitamin with minerals, and topiramate    Allergies:  She is allergic to tea tree oil, zolpidem tartrate, benzonatate, trazodone, cefdinir, and cromolyn.    Objective    Objective     Vitals:         Physical Exam  Vitals and nursing note reviewed. Exam conducted with a chaperone present.   Constitutional:       Appearance: Normal appearance.   HENT:      Head: Normocephalic.   Cardiovascular:      Rate and Rhythm: Normal rate and regular rhythm.      Pulses: Normal pulses.      Heart sounds: Normal heart sounds.   Pulmonary:      Effort: Pulmonary effort is normal.      Breath sounds: Normal breath sounds.   Abdominal:      General: Bowel sounds are normal.      Palpations: Abdomen is soft.      Tenderness: There is no abdominal tenderness.      Hernia: No hernia is present.   Genitourinary:     Comments: Nml female external genitalia.  Cervix is parous. Uterus axial normal size shape and consistency.  No adnexal masses are appreciated    Musculoskeletal:         General: Normal range of motion.      Cervical back: Normal range of motion.   Skin:      General: Skin is warm and dry.   Neurological:      General: No focal deficit present.      Mental Status: She is alert and oriented to person, place, and time.   Psychiatric:         Mood and Affect: Mood normal.         Behavior: Behavior normal.         Thought Content: Thought content normal.         Judgment: Judgment normal.        Result Review    Result Review:  I have personally reviewed the results from the time of this admission to 8/14/2023 19:47 EDT and agree with these findings:  []  Laboratory list / accordion  []  Microbiology  []  Radiology  []  EKG/Telemetry   []  Cardiology/Vascular   []  Pathology  []  Old records  []  Other:  Most notable findings include:       Assessment & Plan   Assessment / Plan     Brief Patient Summary:  Krupa Concepcion is a 38 y.o. female here for bilateral tubal occlusion vs bilateral salpingectomy with hysteroscopy D&C and IUD removal.    Active Hospital Problems:  Active Hospital Problems    Diagnosis     **Encounter for female sterilization procedure      Plan:   Bilateral tubal occlusion vs bilateral salpingectomy with hysteroscopy D&C with IUD removal.The patient was counseled on the risks, benefits and alternatives of BTL.  Risks reviewed, but are not limited to: anesthesia, bleeding, transfusion, infection, damage to organs, bowels, bladder, other internal organs requiring additional surgery to repair or remove any damaged structures.  Possible reoperation, wound separation, blood clots, and death.  Specifically with BTL, she understands it can fail and failure rates are usually less 1/250- 1/300.  If it does fail, she understands she is at higher risk for ectopic or tubal pregnancy and will need evaluation immediately with a positive pregnancy test.   She also understands approximately 10% of women will notice a heavier and more uncomfortable periods. She understands the procedure is meant to be permanent with no guarantee for future fertility after  re-anastamosis.  She understands the increased costs associated with tubal reversal and possible IVF. She declines any non-permanent or non-surgical contraception options to include those with similar efficacy and no surgical risk.  All her questions have been answered to her satisfaction and she desires to proceed. Specifically with salpingectomy, she understands it is not reversible and she would require in vitro fertilization (IVF) to conceive in the future. There is a small possibility it could affect the blood flow to her ovaries and therefore cause earlier menopause.  We have discussed if her intra-operative findings indicate that removal her fallopian tubes would not be recommended, she DOES desire tubal occlusion.  We also discussed it is safe to have MRI and CT scan with IUD. Her questions were answered to her satisfaction and she desires to proceed.     DVT prophylaxis:  No DVT prophylaxis order currently exists.    Aye Vasquez, DO

## 2023-08-15 ENCOUNTER — ANESTHESIA EVENT (OUTPATIENT)
Dept: PERIOP | Facility: HOSPITAL | Age: 38
End: 2023-08-15
Payer: MEDICAID

## 2023-08-15 ENCOUNTER — HOSPITAL ENCOUNTER (OUTPATIENT)
Facility: HOSPITAL | Age: 38
Setting detail: HOSPITAL OUTPATIENT SURGERY
Discharge: HOME | End: 2023-08-15
Attending: OBSTETRICS & GYNECOLOGY | Admitting: OBSTETRICS & GYNECOLOGY
Payer: MEDICAID

## 2023-08-15 ENCOUNTER — TELEPHONE (OUTPATIENT)
Dept: OBSTETRICS AND GYNECOLOGY | Facility: CLINIC | Age: 38
End: 2023-08-15

## 2023-08-15 ENCOUNTER — ANESTHESIA (OUTPATIENT)
Dept: PERIOP | Facility: HOSPITAL | Age: 38
End: 2023-08-15
Payer: MEDICAID

## 2023-08-15 VITALS
TEMPERATURE: 97 F | BODY MASS INDEX: 28.58 KG/M2 | RESPIRATION RATE: 20 BRPM | DIASTOLIC BLOOD PRESSURE: 67 MMHG | OXYGEN SATURATION: 100 % | WEIGHT: 141.76 LBS | SYSTOLIC BLOOD PRESSURE: 96 MMHG | HEART RATE: 84 BPM | HEIGHT: 59 IN

## 2023-08-15 DIAGNOSIS — Z30.2 ENCOUNTER FOR FEMALE STERILIZATION PROCEDURE: ICD-10-CM

## 2023-08-15 LAB
BASOPHILS # BLD AUTO: 0.04 10*3/MM3 (ref 0–0.2)
BASOPHILS NFR BLD AUTO: 0.6 % (ref 0–1.5)
DEPRECATED RDW RBC AUTO: 54 FL (ref 37–54)
EOSINOPHIL # BLD AUTO: 0.09 10*3/MM3 (ref 0–0.4)
EOSINOPHIL NFR BLD AUTO: 1.4 % (ref 0.3–6.2)
ERYTHROCYTE [DISTWIDTH] IN BLOOD BY AUTOMATED COUNT: 17.9 % (ref 12.3–15.4)
HCG SERPL QL: NEGATIVE
HCT VFR BLD AUTO: 31.2 % (ref 34–46.6)
HGB BLD-MCNC: 10.8 G/DL (ref 12–15.9)
IMM GRANULOCYTES # BLD AUTO: 0.07 10*3/MM3 (ref 0–0.05)
IMM GRANULOCYTES NFR BLD AUTO: 1.1 % (ref 0–0.5)
LYMPHOCYTES # BLD AUTO: 1.41 10*3/MM3 (ref 0.7–3.1)
LYMPHOCYTES NFR BLD AUTO: 22.2 % (ref 19.6–45.3)
MCH RBC QN AUTO: 29.5 PG (ref 26.6–33)
MCHC RBC AUTO-ENTMCNC: 34.6 G/DL (ref 31.5–35.7)
MCV RBC AUTO: 85.2 FL (ref 79–97)
MONOCYTES # BLD AUTO: 0.44 10*3/MM3 (ref 0.1–0.9)
MONOCYTES NFR BLD AUTO: 6.9 % (ref 5–12)
NEUTROPHILS NFR BLD AUTO: 4.29 10*3/MM3 (ref 1.7–7)
NEUTROPHILS NFR BLD AUTO: 67.8 % (ref 42.7–76)
NRBC BLD AUTO-RTO: 0 /100 WBC (ref 0–0.2)
PLATELET # BLD AUTO: 252 10*3/MM3 (ref 140–450)
PMV BLD AUTO: 11 FL (ref 6–12)
RBC # BLD AUTO: 3.66 10*6/MM3 (ref 3.77–5.28)
WBC NRBC COR # BLD: 6.34 10*3/MM3 (ref 3.4–10.8)

## 2023-08-15 PROCEDURE — 85025 COMPLETE CBC W/AUTO DIFF WBC: CPT | Performed by: OBSTETRICS & GYNECOLOGY

## 2023-08-15 PROCEDURE — 25010000002 MIDAZOLAM PER 1MG: Performed by: ANESTHESIOLOGY

## 2023-08-15 PROCEDURE — 25010000002 FENTANYL CITRATE (PF) 50 MCG/ML SOLUTION: Performed by: NURSE ANESTHETIST, CERTIFIED REGISTERED

## 2023-08-15 PROCEDURE — 84703 CHORIONIC GONADOTROPIN ASSAY: CPT | Performed by: OBSTETRICS & GYNECOLOGY

## 2023-08-15 PROCEDURE — 25010000002 DEXAMETHASONE PER 1 MG: Performed by: NURSE ANESTHETIST, CERTIFIED REGISTERED

## 2023-08-15 PROCEDURE — 25010000002 BUPIVACAINE (PF) 0.5 % SOLUTION: Performed by: OBSTETRICS & GYNECOLOGY

## 2023-08-15 PROCEDURE — 88305 TISSUE EXAM BY PATHOLOGIST: CPT | Performed by: OBSTETRICS & GYNECOLOGY

## 2023-08-15 PROCEDURE — 25010000002 SUGAMMADEX 200 MG/2ML SOLUTION: Performed by: NURSE ANESTHETIST, CERTIFIED REGISTERED

## 2023-08-15 PROCEDURE — 25010000002 PROPOFOL 10 MG/ML EMULSION: Performed by: NURSE ANESTHETIST, CERTIFIED REGISTERED

## 2023-08-15 PROCEDURE — 88302 TISSUE EXAM BY PATHOLOGIST: CPT | Performed by: OBSTETRICS & GYNECOLOGY

## 2023-08-15 RX ORDER — MAGNESIUM HYDROXIDE 1200 MG/15ML
LIQUID ORAL AS NEEDED
Status: DISCONTINUED | OUTPATIENT
Start: 2023-08-15 | End: 2023-08-15 | Stop reason: HOSPADM

## 2023-08-15 RX ORDER — PROMETHAZINE HYDROCHLORIDE 12.5 MG/1
25 TABLET ORAL ONCE AS NEEDED
Status: DISCONTINUED | OUTPATIENT
Start: 2023-08-15 | End: 2023-08-15 | Stop reason: HOSPADM

## 2023-08-15 RX ORDER — PROMETHAZINE HYDROCHLORIDE 25 MG/1
25 SUPPOSITORY RECTAL ONCE AS NEEDED
Status: DISCONTINUED | OUTPATIENT
Start: 2023-08-15 | End: 2023-08-15 | Stop reason: HOSPADM

## 2023-08-15 RX ORDER — IBUPROFEN 600 MG/1
600 TABLET ORAL EVERY 6 HOURS PRN
Qty: 30 TABLET | Refills: 1 | Status: SHIPPED | OUTPATIENT
Start: 2023-08-15

## 2023-08-15 RX ORDER — ACETAMINOPHEN 325 MG/1
650 TABLET ORAL EVERY 4 HOURS PRN
Qty: 30 TABLET | Refills: 1 | Status: SHIPPED | OUTPATIENT
Start: 2023-08-15 | End: 2024-08-14

## 2023-08-15 RX ORDER — FENTANYL CITRATE 50 UG/ML
INJECTION, SOLUTION INTRAMUSCULAR; INTRAVENOUS AS NEEDED
Status: DISCONTINUED | OUTPATIENT
Start: 2023-08-15 | End: 2023-08-15 | Stop reason: SURG

## 2023-08-15 RX ORDER — HYDROCODONE BITARTRATE AND ACETAMINOPHEN 5; 325 MG/1; MG/1
1-2 TABLET ORAL EVERY 6 HOURS PRN
Qty: 10 TABLET | Refills: 0 | Status: SHIPPED | OUTPATIENT
Start: 2023-08-15 | End: 2023-08-21

## 2023-08-15 RX ORDER — BUPIVACAINE HYDROCHLORIDE 5 MG/ML
INJECTION, SOLUTION EPIDURAL; INTRACAUDAL AS NEEDED
Status: DISCONTINUED | OUTPATIENT
Start: 2023-08-15 | End: 2023-08-15 | Stop reason: HOSPADM

## 2023-08-15 RX ORDER — IBUPROFEN 600 MG/1
600 TABLET ORAL EVERY 6 HOURS PRN
Status: DISCONTINUED | OUTPATIENT
Start: 2023-08-15 | End: 2023-08-15 | Stop reason: HOSPADM

## 2023-08-15 RX ORDER — PROPOFOL 10 MG/ML
VIAL (ML) INTRAVENOUS AS NEEDED
Status: DISCONTINUED | OUTPATIENT
Start: 2023-08-15 | End: 2023-08-15 | Stop reason: SURG

## 2023-08-15 RX ORDER — SODIUM CHLORIDE, SODIUM LACTATE, POTASSIUM CHLORIDE, CALCIUM CHLORIDE 600; 310; 30; 20 MG/100ML; MG/100ML; MG/100ML; MG/100ML
9 INJECTION, SOLUTION INTRAVENOUS CONTINUOUS PRN
Status: DISCONTINUED | OUTPATIENT
Start: 2023-08-15 | End: 2023-08-15 | Stop reason: HOSPADM

## 2023-08-15 RX ORDER — MIDAZOLAM HYDROCHLORIDE 2 MG/2ML
2 INJECTION, SOLUTION INTRAMUSCULAR; INTRAVENOUS ONCE
Status: COMPLETED | OUTPATIENT
Start: 2023-08-15 | End: 2023-08-15

## 2023-08-15 RX ORDER — DEXAMETHASONE SODIUM PHOSPHATE 4 MG/ML
INJECTION, SOLUTION INTRA-ARTICULAR; INTRALESIONAL; INTRAMUSCULAR; INTRAVENOUS; SOFT TISSUE AS NEEDED
Status: DISCONTINUED | OUTPATIENT
Start: 2023-08-15 | End: 2023-08-15 | Stop reason: SURG

## 2023-08-15 RX ORDER — DEXMEDETOMIDINE HYDROCHLORIDE 100 UG/ML
INJECTION, SOLUTION INTRAVENOUS AS NEEDED
Status: DISCONTINUED | OUTPATIENT
Start: 2023-08-15 | End: 2023-08-15 | Stop reason: SURG

## 2023-08-15 RX ORDER — ONDANSETRON 2 MG/ML
4 INJECTION INTRAMUSCULAR; INTRAVENOUS ONCE AS NEEDED
Status: DISCONTINUED | OUTPATIENT
Start: 2023-08-15 | End: 2023-08-15 | Stop reason: HOSPADM

## 2023-08-15 RX ORDER — LIDOCAINE HYDROCHLORIDE 20 MG/ML
INJECTION, SOLUTION EPIDURAL; INFILTRATION; INTRACAUDAL; PERINEURAL AS NEEDED
Status: DISCONTINUED | OUTPATIENT
Start: 2023-08-15 | End: 2023-08-15 | Stop reason: SURG

## 2023-08-15 RX ORDER — ONDANSETRON 4 MG/1
4 TABLET, FILM COATED ORAL ONCE AS NEEDED
Status: DISCONTINUED | OUTPATIENT
Start: 2023-08-15 | End: 2023-08-15 | Stop reason: HOSPADM

## 2023-08-15 RX ORDER — ACETAMINOPHEN 500 MG
1000 TABLET ORAL ONCE
Status: COMPLETED | OUTPATIENT
Start: 2023-08-15 | End: 2023-08-15

## 2023-08-15 RX ORDER — ROCURONIUM BROMIDE 10 MG/ML
INJECTION, SOLUTION INTRAVENOUS AS NEEDED
Status: DISCONTINUED | OUTPATIENT
Start: 2023-08-15 | End: 2023-08-15 | Stop reason: SURG

## 2023-08-15 RX ORDER — MEPERIDINE HYDROCHLORIDE 25 MG/ML
12.5 INJECTION INTRAMUSCULAR; INTRAVENOUS; SUBCUTANEOUS
Status: DISCONTINUED | OUTPATIENT
Start: 2023-08-15 | End: 2023-08-15 | Stop reason: HOSPADM

## 2023-08-15 RX ORDER — PHENYLEPHRINE HCL IN 0.9% NACL 1 MG/10 ML
SYRINGE (ML) INTRAVENOUS AS NEEDED
Status: DISCONTINUED | OUTPATIENT
Start: 2023-08-15 | End: 2023-08-15 | Stop reason: SURG

## 2023-08-15 RX ORDER — HYDROCODONE BITARTRATE AND ACETAMINOPHEN 5; 325 MG/1; MG/1
1 TABLET ORAL ONCE AS NEEDED
Status: DISCONTINUED | OUTPATIENT
Start: 2023-08-15 | End: 2023-08-15 | Stop reason: HOSPADM

## 2023-08-15 RX ADMIN — HYDROCODONE BITARTRATE AND ACETAMINOPHEN 1 TABLET: 5; 325 TABLET ORAL at 13:41

## 2023-08-15 RX ADMIN — Medication 100 MCG: at 12:19

## 2023-08-15 RX ADMIN — DEXAMETHASONE SODIUM PHOSPHATE 4 MG: 4 INJECTION, SOLUTION INTRAMUSCULAR; INTRAVENOUS at 11:45

## 2023-08-15 RX ADMIN — ACETAMINOPHEN 1000 MG: 500 TABLET ORAL at 10:26

## 2023-08-15 RX ADMIN — ROCURONIUM BROMIDE 50 MG: 50 INJECTION INTRAVENOUS at 11:45

## 2023-08-15 RX ADMIN — SUGAMMADEX 250 MG: 100 INJECTION, SOLUTION INTRAVENOUS at 12:59

## 2023-08-15 RX ADMIN — MIDAZOLAM HYDROCHLORIDE 2 MG: 1 INJECTION, SOLUTION INTRAMUSCULAR; INTRAVENOUS at 11:31

## 2023-08-15 RX ADMIN — DEXMEDETOMIDINE 10 MCG: 100 INJECTION, SOLUTION INTRAVENOUS at 11:52

## 2023-08-15 RX ADMIN — LIDOCAINE HYDROCHLORIDE 80 MG: 20 INJECTION, SOLUTION EPIDURAL; INFILTRATION; INTRACAUDAL; PERINEURAL at 11:45

## 2023-08-15 RX ADMIN — ROCURONIUM BROMIDE 20 MG: 50 INJECTION INTRAVENOUS at 12:28

## 2023-08-15 RX ADMIN — DEXMEDETOMIDINE 10 MCG: 100 INJECTION, SOLUTION INTRAVENOUS at 12:03

## 2023-08-15 RX ADMIN — FENTANYL CITRATE 50 MCG: 50 INJECTION, SOLUTION INTRAMUSCULAR; INTRAVENOUS at 11:45

## 2023-08-15 RX ADMIN — PROPOFOL 200 MG: 10 INJECTION, EMULSION INTRAVENOUS at 11:45

## 2023-08-15 RX ADMIN — SODIUM CHLORIDE, POTASSIUM CHLORIDE, SODIUM LACTATE AND CALCIUM CHLORIDE 9 ML/HR: 600; 310; 30; 20 INJECTION, SOLUTION INTRAVENOUS at 10:26

## 2023-08-15 NOTE — TELEPHONE ENCOUNTER
Caller: SORAYA    Relationship:  [facility]GERY     What was the call regarding: CALLED IN TO SCHEDULE A 5 WEEK POST OP APPT, NO AVAILABILITY.     REQUESTED OFFICE CALL PT TO SCHEDULE.

## 2023-08-15 NOTE — OP NOTE
SALPINGECTOMY LAPAROSCOPIC, DILATATION AND CURETTAGE HYSTEROSCOPY Procedure Report    Patient Name:  Krupa Concepcion  YOB: 1985    Date of Surgery:  8/15/2023     Indications:  Desires Sterility, Menorrhagia    Pre-op Diagnosis:   Encounter for female sterilization procedure [Z30.2]  Encounter for IUD Removal  Menorrhagia         Post-Op Diagnosis Codes:     * Encounter for female sterilization procedure [Z30.2]        SAme    Procedure/CPTr Codes:      Procedure(s):  SALPINGECTOMY LAPAROSCOPIC  HYSTEROSCOPY, DILATATION AND CURRETAGE AND IUD REMOVAL    Staff:  Surgeon(s):  Aye Vasquez DO    Assistant: Dalila Gonzales RN CSA    Anesthesia: General    Estimated Blood Loss: 20 mL    Implants:    Nothing was implanted during the procedure    Specimen:          Specimens       ID Source Type Tests Collected By Collected At Frozen?    A Fallopian Tube, Left Tissue TISSUE PATHOLOGY EXAM   Aye Vasquez,  8/15/23 1233 No    Description: LEFT FALLOPIAN TUBE    B Fallopian Tube, Right Tissue TISSUE PATHOLOGY EXAM   Aye Vasquez,  8/15/23 1238 No    Description: RIGHT FALLOPIAN TUBE    C Endometrium Curettings TISSUE PATHOLOGY EXAM   Aye Vasquez, DO 8/15/23 1246     Description: Endometrial Curretings                Findings: Nml uterus, tubes and ovaries. Nml appendix and liver gall bladder not seen.  Adhesions Right mid quadrant and side wall. IUD string not seen outside cervix    Complications: None    Description of Procedure: After the appropriate consents had been obtained, the patient was taken  to the operative suite where she was placed in supine position and  underwent general endotracheal intubation.  After an adequate level of  anesthesia been obtained, the patient was placed in the low lithotomy  position and prepped and draped in usual sterile fashion.  Exam under  anesthesia revealed an axial uterus.  No adnexal masses were appreciated.  A speculum was placed in the  vagina to view the cervix and the anterior  lip of the cervix was grasped with a single-tooth tenaculum.  The IUD string was not visualized. I was not able to retrieve the IUD with a Brooks forcep.  The operataive hysteroscope was opened and easily introduced into the uterus.  The IUD string was visualized and grasped and removed under direct visualization.  The hysteroscope was removed.The Hulka  tenaculum was inserted without incident.  The speculum and single-tooth  tenaculum were removed.  A Vu catheter was placed with drainage of  clear urine.  Attention was now turned towards the abdominal portion of  the procedure.  A supraumbilical incision was made after the skin had been  anesthetized with half percent Marcaine.  The Veress needle was introduced  into the abdominal cavity and correct place was confirmed using water  saline test.  A pneumoperitoneum was then created using carbon dioxide  gas.  After an adequate pneumoperitoneum had been created, the Veress  needle was removed.  The laparoscope and the 5 mm trocar and sleeve was  introduced into the abdominal cavity under direct visualization.  The  trocar was removed and the laparoscope was reinserted.  The patient was  placed in steep Trendelenburg.  The above findings were noted.  The  decision was made to proceed with a laparoscopic approach.  A second  incision was made in the left middle quadrant using landmarks of 3 cm  below the umbilicus and 8 cm to the left of the umbilicus.  The skin was  anesthetized with half percent Marcaine and an 8 mm trocar and sleeve was  introduced under direct visualization.  The trocar was removed.  A third  incision was made in the right middle quadrant again 3 cm below the  umbilicus and 8 cm to the right of the umbilicus after the skin had been  anesthetized with half percent Marcaine.  A 5 mm trocar and sleeve was  introduced under direct realization.  The trocar was removed.  A probe was  introduced through the  left middle quadrant port and the above findings  were confirmed.  The LigaSure device was placed through the port in the  patient's left middle quadrant and a grasper was placed to the port in the  patient's right middle quadrant.  The right tube was grasped and using the  LigaSure device remaining very close to the antimesenteric portion of the  tube the tube was desiccated cut and removed.  A similar procedure was  carried on the opposite side.  The fulguration sites were reinspected and  hemostasis was assured.  The gas was allowed to escape from the abdomen  and the instruments were removed.  The skin was reapproximated in a  subcuticular fashion using suture of 4-0 Monocryl.  Dermabond dressing was  placed.  The Vu catheter was removed and the Hulka tenaculum was  removed.  The speculum was re introduced into the vagina and the anterior lip of the cervix was grasped with a single toothed tenaculum.  The uterus sounded to 7cm.  The hysteroscope that was previously attached to saline was introduced into the uterine cavity and the cavity was distended.  A proliferative pattern was noted.  Normal tubal ostia were seen.  No polyps or fibroids were noted.  The hysteroscope was removed.  The uterus was sharply curretted and the specimen was handed off the field and labeled as endometrial currettings. The speculum and single tooth tenaculum were removed.The patient was taken out of the lithotomy position awakened,  extubated and transferred to the recovery room in stable and satisfactory  condition.  The sponge count instrument counts were verified as correct.      Assistant: Dalila Gonzales RN CSA  was responsible for performing the following activities: Closing and Held/Positioned Camera and their skilled assistance was necessary for the success of this case.    Aye Vasquez DO     Date: 8/15/2023  Time: 13:26 EDT

## 2023-08-15 NOTE — DISCHARGE INSTRUCTIONS
DISCHARGE INSTRUCTIONS  GYNECOLOGICAL  PROCEDURES      For your surgery you had:  General anesthesia (you may have a sore throat for the first 24 hours)    You may experience dizziness, drowsiness, or lightheadedness for several hours following surgery.  Do not stay alone today or tonight.  Limit your activity for 24 hours.  Resume your diet slowly.  Follow any special dietary instructions you may have been given by your doctor.  You should not drive or operate machinery, drink alcohol, or sign legally binding documents for 24 hours or while you are taking pain medication.    NOTIFY YOUR DOCTOR IF YOU EXPERIENCE ANY OF THE FOLLOWING:  Temperature greater than 101 degrees Fahrenheit  Shaking Chills  Redness or excessive drainage from incision  Nausea, vomiting and/or pain that is not controlled by prescribed medications  Increase in bleeding or bleeding that is excessive  Unable to urinate in 6 hours after surgery  If unable to reach your doctor, please go to the closest Emergency Room [] Remove dressing:      [] Skin adhesive will flake off in 10-14 days.    [x] Nothing in the vagina for 5 weeks to include intercourse, douches, or tampons.  [x] You may resume intercourse and the use of tampons as your physician has instructed you.      Vaginal bleeding may be expected for several days with flow decreasing with time and never any heavier than a normal  period.  If you have an ablation, vaginal discharge is expected after bleeding stops.   If you have foul smelling discharge, notify your physician.  Medications per physician instructions as indicated on After Visit Summary.    Last dose of pain medication was given at:   Norco 5/325 mg @ 1:41 pm, next @ 7:41 pm    SPECIAL INSTRUCTIONS:

## 2023-08-15 NOTE — ANESTHESIA PREPROCEDURE EVALUATION
Anesthesia Evaluation     Patient summary reviewed and Nursing notes reviewed   no history of anesthetic complications:   NPO Solid Status: > 8 hours  NPO Liquid Status: > 2 hours           Airway   Mallampati: II  TM distance: >3 FB  Neck ROM: full  No difficulty expected  Dental      Pulmonary - normal exam    breath sounds clear to auscultation  (+) asthma,  Cardiovascular - negative cardio ROS and normal exam  Exercise tolerance: good (4-7 METS)    Rhythm: regular  Rate: normal        Neuro/Psych  (+) headaches, psychiatric history  GI/Hepatic/Renal/Endo    (+) GERD, thyroid problem     Musculoskeletal (-) negative ROS    Abdominal    Substance History - negative use     OB/GYN negative ob/gyn ROS         Other - negative ROS       ROS/Med Hx Other: >4METS, HX CARDS W/U FOR CHEST PAIN 2020. CARD CATH Cardiac catheterization 7/8/2020Left ventricle size and contractility is normal with ejection fraction of 60%.Normal epicardial coronary arteries. RELEASED. FOLLOWS PCP, LAST OV 4/10/23 F/U 6MTHS. KT                 Anesthesia Plan    ASA 2     general     (Patient understands anesthesia not responsible for dental damage.)  intravenous induction     Anesthetic plan, risks, benefits, and alternatives have been provided, discussed and informed consent has been obtained with: patient.    Use of blood products discussed with patient .    Plan discussed with CRNA.    CODE STATUS:

## 2023-08-15 NOTE — ANESTHESIA POSTPROCEDURE EVALUATION
Patient: Krupa Concepcion    Procedure Summary       Date: 08/15/23 Room / Location: [facility] OR 05 / [facility] MAIN OR    Anesthesia Start: 1138 Anesthesia Stop: 1314    Procedures:       SALPINGECTOMY LAPAROSCOPIC (Bilateral: Abdomen)      HYSTEROSCOPY, DILATATION AND CURRETAGE AND IUD REMOVAL (Vagina) Diagnosis:       Encounter for female sterilization procedure      (Encounter for female sterilization procedure [Z30.2])    Surgeons: Aye Vasquez DO Provider: Rob Forrester MD    Anesthesia Type: general ASA Status: 2            Anesthesia Type: general    Vitals  Vitals Value Taken Time   /60 08/15/23 1342   Temp 36.1 øC (97 øF) 08/15/23 1340   Pulse 78 08/15/23 1344   Resp 22 08/15/23 1340   SpO2 99 % 08/15/23 1344   Vitals shown include unvalidated device data.        Post Anesthesia Care and Evaluation    Patient location during evaluation: bedside  Patient participation: complete - patient participated  Level of consciousness: awake  Pain management: adequate    Airway patency: patent  PONV Status: none  Cardiovascular status: acceptable and stable  Respiratory status: acceptable  Hydration status: acceptable    Comments: An Anesthesiologist personally participated in the most demanding procedures (including induction and emergence if applicable) in the anesthesia plan, monitored the course of anesthesia administration at frequent intervals and remained physically present and available for immediate diagnosis and treatment of emergencies.

## 2023-08-16 ENCOUNTER — TELEPHONE (OUTPATIENT)
Dept: INTERNAL MEDICINE | Facility: CLINIC | Age: 38
End: 2023-08-16

## 2023-08-16 LAB
CYTO UR: NORMAL
LAB AP CASE REPORT: NORMAL
LAB AP CLINICAL INFORMATION: NORMAL
PATH REPORT.FINAL DX SPEC: NORMAL
PATH REPORT.GROSS SPEC: NORMAL

## 2023-08-16 NOTE — TELEPHONE ENCOUNTER
Caller: Krupa Concepcion    Relationship to patient: Self    Best call back number: [phone]     Patient is needing: PATIENT STATES THAT SHE HAD A LAPAROSCOPIC SALPINGECTOMY  YESTERDAY AND SHE IS INQUIRING WHEN SHE SHOULD HAVE A BOWEL MOVEMENT.

## 2023-08-18 RX ORDER — FLUTICASONE PROPIONATE 50 MCG
2 SPRAY, SUSPENSION (ML) NASAL NIGHTLY
Qty: 16 G | Refills: 3 | Status: SHIPPED | OUTPATIENT
Start: 2023-08-18

## 2023-08-18 RX ORDER — DOXYCYCLINE HYCLATE 100 MG/1
100 CAPSULE ORAL 2 TIMES DAILY
Qty: 20 CAPSULE | Refills: 0 | Status: SHIPPED | OUTPATIENT
Start: 2023-08-18

## 2023-08-18 NOTE — TELEPHONE ENCOUNTER
Rx Refill Note  Requested Prescriptions      No prescriptions requested or ordered in this encounter      Last office visit with prescribing clinician: 4/10/2023   Last telemedicine visit with prescribing clinician: Visit date not found   Next office visit with prescribing clinician: 10/24/2023                         Would you like a call back once the refill request has been completed: [] Yes [] No    If the office needs to give you a call back, can they leave a voicemail: [] Yes [] No    Naida Orr MA  08/18/23, 15:48 EDT

## 2023-08-20 ENCOUNTER — TELEPHONE (OUTPATIENT)
Dept: OBSTETRICS AND GYNECOLOGY | Facility: CLINIC | Age: 38
End: 2023-08-20
Payer: MEDICAID

## 2023-08-20 NOTE — TELEPHONE ENCOUNTER
Marylou, this patient called on Sunday with concerns with a vaginal odor.  She had surgery w Dr. Vasquez 2weeks ago for elective sterilization.  She was unclear on the history but it sounds like somebody gave her doxycycline for the same complaint and it has not been working.  She is still having a vaginal odor.  Would you please make sure she gets in with one of the [facility]ARRO's or Dr. Vasquez as soon as possible for evaluation and treatment?  Thank you

## 2023-08-21 ENCOUNTER — OFFICE VISIT (OUTPATIENT)
Dept: OBSTETRICS AND GYNECOLOGY | Facility: CLINIC | Age: 38
End: 2023-08-21
Payer: MEDICAID

## 2023-08-21 VITALS
HEART RATE: 99 BPM | DIASTOLIC BLOOD PRESSURE: 80 MMHG | SYSTOLIC BLOOD PRESSURE: 117 MMHG | WEIGHT: 138 LBS | BODY MASS INDEX: 27.87 KG/M2

## 2023-08-21 DIAGNOSIS — Z09 POSTOPERATIVE FOLLOW-UP: ICD-10-CM

## 2023-08-21 DIAGNOSIS — N76.0 ACUTE VAGINITIS: Primary | ICD-10-CM

## 2023-08-21 LAB
CANDIDA SPECIES: NEGATIVE
GARDNERELLA VAGINALIS: NEGATIVE
T VAGINALIS DNA VAG QL PROBE+SIG AMP: NEGATIVE

## 2023-08-21 PROCEDURE — 1160F RVW MEDS BY RX/DR IN RCRD: CPT | Performed by: OBSTETRICS & GYNECOLOGY

## 2023-08-21 PROCEDURE — 87510 GARDNER VAG DNA DIR PROBE: CPT | Performed by: OBSTETRICS & GYNECOLOGY

## 2023-08-21 PROCEDURE — 99024 POSTOP FOLLOW-UP VISIT: CPT | Performed by: OBSTETRICS & GYNECOLOGY

## 2023-08-21 PROCEDURE — 87660 TRICHOMONAS VAGIN DIR PROBE: CPT | Performed by: OBSTETRICS & GYNECOLOGY

## 2023-08-21 PROCEDURE — 87480 CANDIDA DNA DIR PROBE: CPT | Performed by: OBSTETRICS & GYNECOLOGY

## 2023-08-21 PROCEDURE — 1159F MED LIST DOCD IN RCRD: CPT | Performed by: OBSTETRICS & GYNECOLOGY

## 2023-08-21 RX ORDER — SODIUM FLUORIDE 6.1 MG/ML
PASTE, DENTIFRICE DENTAL
COMMUNITY
Start: 2023-08-16

## 2023-08-21 NOTE — PROGRESS NOTES
Post Operative Visit        Chief Complaint   Patient presents with    Post-op Follow-up     SALPINGECTOMY LAPAROSCOPIC   HYSTEROSCOPY, DILATATION AND CURRETAGE AND IUD REMOVAL           HPI:   Pain:  yes, moderate pain  Vaginal bleeding:  yes  Vaginal discharge:  yes, with odor   Fever/chills:   Sometimes   Good appetite:   Appetite low   Normal bladder function:  no  Normal bowel function:  no  Hot flashes: no  Pt is c/o vaginal discharge with odor.  PHYSICAL EXAM:  /80   Pulse 99   Wt 62.6 kg (138 lb)   LMP 08/07/2023 (Approximate) Comment: HCG NEGATIVE  Breastfeeding No   BMI 27.87 kg/mý   General- NAD, alert and oriented, appropriate  Psych- Normal mood, good memory    Abdomen- Soft, non distended, non tender, no masses  Incision/s-  Clean, dry, intact, healing well     External genitalia- Normal, no lesions  Urethra- Normal, no masses, non tender  Vagina-  scant yellowish discharge in vault  Bladder- Normal, no masses, non tender, no prolapse  Cervix- Normal, no lesions, no discharge, No cervical motion tenderness  Uterus- Normal size, shape & consistency.  Non tender, mobile, & no prolapse  Adnexa- No mass, non tender    Lymphatic- No palpable groin nodes  Extremities- No edema, no cyanosis   Skin- No lesions, no rashes    CLINICAL PHOTOGRAPHS - SCAN - MAHENDRA TUBAL OCCLUSION PROCEDURE PHOTOS, [facility], 08/15/2023 (08/15/2023)    Tissue Pathology Exam (08/15/2023 12:33)    Op Note by Aye Vasquez DO (08/15/2023 12:12)    ASSESSMENT and PLAN:  Post-operative exam    Diagnoses and all orders for this visit:    1. Acute vaginitis (Primary)  -     Gardnerella vaginalis, Trichomonas vaginalis, Candida albicans, DNA - Swab, Vagina    2. Postoperative follow-up        Operative report, surgical findings and any pathology/photos reviewed.  All questions answered.     Counseling:   Ok to resume normal activities  Ok to resume intercourse  F/u 3 weeks      Follow Up:  No follow-ups on file.            Aye HERNANDEZ  DO Pedro  08/21/2023    [facility]  [address] [place] [address]  Dept: [phone]  Dept Fax: [phone]  Loc: [phone]  Loc Fax: [phone]

## 2023-09-13 NOTE — PROGRESS NOTES
Post Operative Visit        Chief Complaint   Patient presents with    Post-op     SALPINGECTOMY LAPAROSCOPIC        HYSTEROSCOPY, DILATATION AND CURRETAGE AND IUD REMOVAL       HPI:   Pain:  no  Vaginal bleeding:  no  Vaginal discharge:  no  Fever/chills:  no  Good appetite:  yes  Normal bladder function:  yes  Normal bowel function:  yes  Hot flashes: no    PHYSICAL EXAM:  /66   Wt 63 kg (139 lb)   LMP 08/07/2023 (Approximate) Comment: HCG NEGATIVE  Breastfeeding No   BMI 28.07 kg/m²   General- NAD, alert and oriented, appropriate  Psych- Normal mood, good memory    Abdomen- Soft, non distended, non tender, no masses  Incision/s-  Clean, dry, intact, healing well         Op Note by Aye Vasquez DO (08/15/2023 12:12)    CLINICAL PHOTOGRAPHS - SCAN - MAHENDRA TUBAL OCCLUSION PROCEDURE PHOTOS, [facility], 08/15/2023 (08/15/2023)    Tissue Pathology Exam (08/15/2023 12:33)    ASSESSMENT and PLAN:  Post-operative exam    Diagnoses and all orders for this visit:    1. Postoperative follow-up (Primary)        Operative report, surgical findings and any pathology/photos reviewed.  All questions answered.     Counseling:   Ok to resume normal activities  Ok to resume intercourse  Return to school/work without limitations      Follow Up:  Return in about 1 year (around 9/14/2024) for Annual physical.            Aye Vasquez DO  09/14/2023    [facility]  [address] [place] [address]  Dept: [phone]  Dept Fax: [phone]  Loc: [phone]  Loc Fax: [phone]

## 2023-09-14 ENCOUNTER — OFFICE VISIT (OUTPATIENT)
Dept: OBSTETRICS AND GYNECOLOGY | Facility: CLINIC | Age: 38
End: 2023-09-14
Payer: MEDICAID

## 2023-09-14 VITALS — WEIGHT: 139 LBS | SYSTOLIC BLOOD PRESSURE: 108 MMHG | BODY MASS INDEX: 28.07 KG/M2 | DIASTOLIC BLOOD PRESSURE: 66 MMHG

## 2023-09-14 DIAGNOSIS — Z09 POSTOPERATIVE FOLLOW-UP: Primary | ICD-10-CM

## 2023-09-14 RX ORDER — QUETIAPINE FUMARATE 200 MG/1
200 TABLET, FILM COATED ORAL 3 TIMES DAILY
COMMUNITY

## 2023-09-14 RX ORDER — WHEAT DEXTRIN 1 G
TABLET,CHEWABLE ORAL
COMMUNITY

## 2023-10-09 ENCOUNTER — TELEPHONE (OUTPATIENT)
Dept: INTERNAL MEDICINE | Facility: CLINIC | Age: 38
End: 2023-10-09
Payer: MEDICAID

## 2023-10-09 RX ORDER — FAMOTIDINE 20 MG/1
20 TABLET, FILM COATED ORAL EVERY 12 HOURS
Qty: 180 TABLET | OUTPATIENT
Start: 2023-10-09

## 2023-10-09 RX ORDER — FAMOTIDINE 20 MG/1
20 TABLET, FILM COATED ORAL EVERY 12 HOURS SCHEDULED
Qty: 60 TABLET | Refills: 0 | Status: SHIPPED | OUTPATIENT
Start: 2023-10-09

## 2023-10-09 NOTE — TELEPHONE ENCOUNTER
Caller: Krupa Concepcion    Relationship: Self    Best call back number: [phone]     What is the best time to reach you: AROUND 10, LEAVE VOICEMAIL IF NO ANSWER    Who are you requesting to speak with (clinical staff, provider,  specific staff member): CLINICAL    What was the call regarding: PATIENT STATES THAT SHE NEEDS A PRIOR AUTHORIZATION FOR HER FAMOTIDINE.    Is it okay if the provider responds through [employer]:

## 2023-10-16 NOTE — PROGRESS NOTES
"Well Woman Visit    CC: Scheduled annual well gyn visit  Chief Complaint   Patient presents with    Annual Exam       Myriad intake in the past?: No    DONE TODAY QUALIFIED TODAY Declined testing    Contraception:  Bilateral salpingectomy    HPI:   38 y.o.     Menses:   q 30 days, lasts 3-5 days, changes products q 4-6 hrs on heaviest days.     Pain:  Mild, OTC meds control discomfort    PCP: does manage PMHx and preventative labs  History: PMHx, Meds, Allergies, PSHx, Social Hx, and POBHx all reviewed and updated.    Pt has no complaints today.    PHYSICAL EXAM:  BP 97/76   Pulse 106   Ht 149.9 cm (59\")   Wt 62.1 kg (137 lb)   LMP 10/17/2023   Breastfeeding No   BMI 27.67 kg/m²  Not found.  General- NAD, alert and oriented, appropriate  Psych- Normal mood, good memory  Neck- No masses, no thyroid enlargement  CV- Regular rhythm, no murnurs  Resp- CTA to bases, no wheezes  Abdomen- Soft, non distended, non tender, no masses    Breast left-  Bilaterally symmetrical, no masses, non tender, no nipple discharge  Breast right- Bilaterally symmetrical, no masses, non tender, no nipple discharge    External genitalia- Normal female, no lesions  Urethra/meatus- Normal, no masses, non tender  Bladder- Normal, no masses, non tender  Vagina- Normal, no atrophy, no lesions, no discharge.    Cvx- Normal, no lesions, no discharge, No cervical motion tenderness  Uterus- Normal size, shape & consistency.  Non tender, mobile, & no prolapse  Adnexa- No mass, non tender  Anus/Rectum/Perineum- Not performed    Lymphatic- No palpable neck, axillary, or groin nodes  Ext- No edema, no cyanosis    Skin- No lesions, no rashes, no acanthosis nigricans      ASSESSMENT and PLAN:    Diagnoses and all orders for this visit:    1. Well woman exam with routine gynecological exam (Primary)    2. Dysuria  -     POC Urinalysis Dipstick  -     Urine Culture - Urine, Urine, Clean Catch        Preventative:  BREAST HEALTH- Monthly self " breast exam importance and how to reviewed. MMG and/or MRI (prn) reviewed per society guidelines and her individual history. Screen: Already up to date  CERVICAL CANCER Screening- Reviewed current ASCCP guidelines for screening w and wo cotest HPV, age specific.  Screen: Already up to date  Follow up PCP/Specialist PMHx and Labs      She understands the importance of having any ordered tests to be performed in a timely fashion.  The risks of not performing them include, but are not limited to, advanced cancer stages, bone loss from osteoporosis and/or subsequent increase in morbidity and/or mortality.  She is encouraged to review her results online and/or contact or office if she has questions.     Follow Up:  Return in about 1 year (around 10/17/2024) for Annual physical.            Anne Gould MD  10/17/2023    [facility]  [address] [place] [address]  Dept: [phone]  Dept Fax: [phone]  Loc: [phone]  Loc Fax: [phone]

## 2023-10-17 ENCOUNTER — OFFICE VISIT (OUTPATIENT)
Dept: OBSTETRICS AND GYNECOLOGY | Facility: CLINIC | Age: 38
End: 2023-10-17
Payer: MEDICAID

## 2023-10-17 VITALS
HEART RATE: 106 BPM | WEIGHT: 137 LBS | HEIGHT: 59 IN | BODY MASS INDEX: 27.62 KG/M2 | DIASTOLIC BLOOD PRESSURE: 76 MMHG | SYSTOLIC BLOOD PRESSURE: 97 MMHG

## 2023-10-17 DIAGNOSIS — Z01.419 WELL WOMAN EXAM WITH ROUTINE GYNECOLOGICAL EXAM: Primary | ICD-10-CM

## 2023-10-17 DIAGNOSIS — R30.0 DYSURIA: ICD-10-CM

## 2023-10-17 LAB
BILIRUB BLD-MCNC: NEGATIVE MG/DL
CLARITY, POC: ABNORMAL
COLOR UR: ABNORMAL
GLUCOSE UR STRIP-MCNC: NEGATIVE MG/DL
KETONES UR QL: NEGATIVE
LEUKOCYTE EST, POC: ABNORMAL
NITRITE UR-MCNC: NEGATIVE MG/ML
PH UR: 6.5 [PH] (ref 5–8)
PROT UR STRIP-MCNC: ABNORMAL MG/DL
RBC # UR STRIP: ABNORMAL /UL
SP GR UR: 1.01 (ref 1–1.03)
UROBILINOGEN UR QL: NORMAL

## 2023-10-17 PROCEDURE — 87086 URINE CULTURE/COLONY COUNT: CPT | Performed by: OBSTETRICS & GYNECOLOGY

## 2023-10-17 RX ORDER — POLYETHYLENE GLYCOL 3350 17 G/17G
POWDER, FOR SOLUTION ORAL
COMMUNITY
Start: 2023-09-27

## 2023-10-18 LAB — BACTERIA SPEC AEROBE CULT: NORMAL

## 2023-10-19 ENCOUNTER — TELEPHONE (OUTPATIENT)
Dept: INTERNAL MEDICINE | Facility: CLINIC | Age: 38
End: 2023-10-19
Payer: MEDICAID

## 2023-10-19 NOTE — TELEPHONE ENCOUNTER
Caller: Krupa Concepcion    Relationship: Self    Best call back number: [phone]     What is the best time to reach you: ANY     Who are you requesting to speak with (clinical staff, provider,  specific staff member): CLINICAL     Do you know the name of the person who called: KRUPA     What was the call regarding: PATIENT IS REQUESTING A CALL BACK TO SEE IF SHE HAS ANY ACTIVE LAB ORDERS TO BE COMPLETED.     PLEASE ADVISE     Is it okay if the provider responds through MyChart: NO

## 2023-10-20 ENCOUNTER — HOSPITAL ENCOUNTER (OUTPATIENT)
Dept: MAMMOGRAPHY | Facility: HOSPITAL | Age: 38
Discharge: HOME | End: 2023-10-20
Payer: MEDICAID

## 2023-10-20 ENCOUNTER — LAB (OUTPATIENT)
Dept: LAB | Facility: HOSPITAL | Age: 38
End: 2023-10-20
Payer: MEDICAID

## 2023-10-20 DIAGNOSIS — Z12.31 SCREENING MAMMOGRAM FOR BREAST CANCER: ICD-10-CM

## 2023-10-20 DIAGNOSIS — Z86.69 HX OF MIGRAINE HEADACHES: ICD-10-CM

## 2023-10-20 DIAGNOSIS — E03.9 HYPOTHYROIDISM, UNSPECIFIED TYPE: ICD-10-CM

## 2023-10-20 LAB
25(OH)D3 SERPL-MCNC: 28.7 NG/ML (ref 30–100)
ALBUMIN SERPL-MCNC: 5 G/DL (ref 3.5–5.2)
ALBUMIN/GLOB SERPL: 2.1 G/DL
ALP SERPL-CCNC: 74 U/L (ref 39–117)
ALT SERPL W P-5'-P-CCNC: 16 U/L (ref 1–33)
ANION GAP SERPL CALCULATED.3IONS-SCNC: 9 MMOL/L (ref 5–15)
AST SERPL-CCNC: 13 U/L (ref 1–32)
BILIRUB SERPL-MCNC: 0.5 MG/DL (ref 0–1.2)
BUN SERPL-MCNC: 13 MG/DL (ref 6–20)
BUN/CREAT SERPL: 14 (ref 7–25)
CALCIUM SPEC-SCNC: 9.8 MG/DL (ref 8.6–10.5)
CHLORIDE SERPL-SCNC: 108 MMOL/L (ref 98–107)
CO2 SERPL-SCNC: 22 MMOL/L (ref 22–29)
CREAT SERPL-MCNC: 0.93 MG/DL (ref 0.57–1)
EGFRCR SERPLBLD CKD-EPI 2021: 79.8 ML/MIN/1.73
GLOBULIN UR ELPH-MCNC: 2.4 GM/DL
GLUCOSE SERPL-MCNC: 86 MG/DL (ref 65–99)
POTASSIUM SERPL-SCNC: 3.9 MMOL/L (ref 3.5–5.2)
PROT SERPL-MCNC: 7.4 G/DL (ref 6–8.5)
SODIUM SERPL-SCNC: 139 MMOL/L (ref 136–145)
T4 FREE SERPL-MCNC: 0.95 NG/DL (ref 0.93–1.7)
TSH SERPL DL<=0.05 MIU/L-ACNC: 0.94 UIU/ML (ref 0.27–4.2)

## 2023-10-20 PROCEDURE — 77067 SCR MAMMO BI INCL CAD: CPT

## 2023-10-20 PROCEDURE — 84439 ASSAY OF FREE THYROXINE: CPT

## 2023-10-20 PROCEDURE — 84443 ASSAY THYROID STIM HORMONE: CPT

## 2023-10-20 PROCEDURE — 80053 COMPREHEN METABOLIC PANEL: CPT

## 2023-10-20 PROCEDURE — 82306 VITAMIN D 25 HYDROXY: CPT

## 2023-10-20 PROCEDURE — 77063 BREAST TOMOSYNTHESIS BI: CPT

## 2023-10-23 RX ORDER — CETIRIZINE HYDROCHLORIDE 5 MG/1
5 TABLET ORAL DAILY
Qty: 90 TABLET | Refills: 1 | Status: SHIPPED | OUTPATIENT
Start: 2023-10-23 | End: 2023-10-24 | Stop reason: DRUGHIGH

## 2023-10-24 ENCOUNTER — OFFICE VISIT (OUTPATIENT)
Dept: INTERNAL MEDICINE | Facility: CLINIC | Age: 38
End: 2023-10-24
Payer: MEDICAID

## 2023-10-24 VITALS
OXYGEN SATURATION: 98 % | HEIGHT: 59 IN | TEMPERATURE: 98.1 F | BODY MASS INDEX: 26.85 KG/M2 | HEART RATE: 66 BPM | DIASTOLIC BLOOD PRESSURE: 80 MMHG | WEIGHT: 133.2 LBS | SYSTOLIC BLOOD PRESSURE: 122 MMHG

## 2023-10-24 DIAGNOSIS — R60.9 BODY FLUID RETENTION: ICD-10-CM

## 2023-10-24 DIAGNOSIS — J30.1 SEASONAL ALLERGIC RHINITIS DUE TO POLLEN: Chronic | ICD-10-CM

## 2023-10-24 DIAGNOSIS — E55.9 VITAMIN D DEFICIENCY: ICD-10-CM

## 2023-10-24 DIAGNOSIS — E03.9 HYPOTHYROIDISM, UNSPECIFIED TYPE: Primary | Chronic | ICD-10-CM

## 2023-10-24 DIAGNOSIS — Z86.69 HX OF MIGRAINE HEADACHES: Chronic | ICD-10-CM

## 2023-10-24 PROCEDURE — 99214 OFFICE O/P EST MOD 30 MIN: CPT | Performed by: NURSE PRACTITIONER

## 2023-10-24 PROCEDURE — 1160F RVW MEDS BY RX/DR IN RCRD: CPT | Performed by: NURSE PRACTITIONER

## 2023-10-24 PROCEDURE — 1159F MED LIST DOCD IN RCRD: CPT | Performed by: NURSE PRACTITIONER

## 2023-10-24 RX ORDER — CETIRIZINE HYDROCHLORIDE 10 MG/1
10 TABLET ORAL DAILY
COMMUNITY

## 2023-10-24 RX ORDER — SPIRONOLACTONE 25 MG/1
TABLET ORAL
Qty: 30 TABLET | Refills: 2 | Status: SHIPPED | OUTPATIENT
Start: 2023-10-24

## 2023-10-24 RX ORDER — ERGOCALCIFEROL 1.25 MG/1
50000 CAPSULE ORAL WEEKLY
Qty: 12 CAPSULE | Refills: 0 | Status: SHIPPED | OUTPATIENT
Start: 2023-10-24

## 2023-10-24 NOTE — PROGRESS NOTES
Chief Complaint  Follow-up (The patient would like to discuss if she needs a water pill. Patient would also like to go over test results. )  Subjective    History of Present Illness  Krupa Concepcion is a 38 y.o. female  presents to [facility] for follow-up of hypothyroidism, chronic migraines, allergies and vitamin D deficiency. Complaining of fluid retention in extremities and request water pill to help.       Past Medical History:   Diagnosis Date    Abnormal Pap smear of cervix     Anesthesia     SLOW TO WAKE UP AND IS EMOTIONAL    Anxiety     Arrhythmia     Asthma     AS A CHILD    Chest pain 08/14/2019    HX OF CHEST PAIN. SAW DR GARCIA IN 2020 PT REPORTS HAD CARDIAC TESTING AND WAS RELEASED. FOLLOWED BY PCP. DENIES CP/SOA AND IS ACTIVE    Depression     Disease of thyroid gland     GERD (gastroesophageal reflux disease)     HPV (human papilloma virus) infection     Migraine without aura     Schizoaffective disorder     REPORTS IS BEING EVALUATED    Trauma         Past Surgical History:   Procedure Laterality Date    BREAST BIOPSY Left 2023    (-)    CARDIAC CATHETERIZATION N/A 07/08/2020    Procedure: Left Heart Cath with Coronary Angiography;  Surgeon: Lilibeth Garcia MD;  Location: [facility] CATH INVASIVE LOCATION;  Service: Cardiovascular;  Laterality: N/A;    CERVICAL CONIZATION, LEEP      COLONOSCOPY      D & C HYSTEROSCOPY ENDOMETRIAL ABLATION N/A 8/15/2023    Procedure: HYSTEROSCOPY, DILATATION AND CURRETAGE AND IUD REMOVAL;  Surgeon: Aye Vasquez DO;  Location: [facility] OR;  Service: Gynecology;  Laterality: N/A;    DENTAL PROCEDURE      EAR TUBES Bilateral     x2 times    ENDOSCOPY      SALPINGECTOMY Bilateral 8/15/2023    Procedure: SALPINGECTOMY LAPAROSCOPIC;  Surgeon: Aye Vasquez DO;  Location: [facility] OR;  Service: Gynecology;  Laterality: Bilateral;    TONSILLECTOMY          Allergies   Allergen Reactions    Tea Tree Oil Anaphylaxis     Zolpidem Tartrate Mental Status Change    Trazodone Other (See Comments)     Leg cramping     Benzonatate Other (See Comments)     Does not work;pt states she is not allergic.     Cefdinir Hallucinations     hallucinations    Cromolyn Rash          Current Outpatient Medications:     cloNIDine (CATAPRES) 0.1 MG tablet, Take 1 tablet by mouth every night at bedtime., Disp: 30 tablet, Rfl: 0    famotidine (PEPCID) 20 MG tablet, Take 1 tablet by mouth Every 12 (Twelve) Hours., Disp: 60 tablet, Rfl: 0    fluticasone (FLONASE) 50 MCG/ACT nasal spray, 2 sprays into the nostril(s) as directed by provider Every Night., Disp: 16 g, Rfl: 3    levothyroxine (SYNTHROID, LEVOTHROID) 50 MCG tablet, Take 1 tablet by mouth Daily. (Patient taking differently: Take 1 tablet by mouth Every Morning.), Disp: 30 tablet, Rfl: 0    Melatonin 1 MG chewable tablet, Chew 10 tablets., Disp: , Rfl:     multivitamin with minerals tablet tablet, Take 1 tablet by mouth Daily., Disp: , Rfl:     polyethylene glycol (MIRALAX) 17 g packet, MIX 1 PACKET ONTO NON-DAIRY LIQUID AND TAKE BY MOUTH ONCE DAILY, Disp: , Rfl:     QUEtiapine (SEROquel) 200 MG tablet, Take 3 tablets by mouth Every Night., Disp: , Rfl:     Sodium Fluoride 5000 PPM 1.1 % gel, , Disp: , Rfl:     topiramate (TOPAMAX) 100 MG tablet, Take 1 tablet by mouth 2 (Two) Times a Day., Disp: 60 tablet, Rfl: 0    Wheat Dextrin (Benefiber) chewable tablet, Chew., Disp: , Rfl:     cetirizine (zyrTEC) 10 MG tablet, Take 1 tablet by mouth Daily., Disp: , Rfl:     spironolactone (ALDACTONE) 25 MG tablet, Take 1/2 tablet every morning as needed for fluid retention., Disp: 30 tablet, Rfl: 2    vitamin D (ERGOCALCIFEROL) 1.25 MG (20459 UT) capsule capsule, Take 1 capsule by mouth 1 (One) Time Per Week. Take with a fatty meal., Disp: 12 capsule, Rfl: 0    Objective   /80 (BP Location: Left arm, Patient Position: Sitting, Cuff Size: Adult)   Pulse 66   Temp 98.1 °F (36.7 °C) (Temporal)   Ht  "149.9 cm (59\")   Wt 60.4 kg (133 lb 3.2 oz)   LMP 10/17/2023   SpO2 98%   BMI 26.90 kg/m²    Estimated body mass index is 26.9 kg/m² as calculated from the following:    Height as of this encounter: 149.9 cm (59\").    Weight as of this encounter: 60.4 kg (133 lb 3.2 oz).   Physical Exam  Vitals reviewed.   Constitutional:       General: She is not in acute distress.  HENT:      Head: Normocephalic and atraumatic.   Cardiovascular:      Rate and Rhythm: Normal rate and regular rhythm.      Heart sounds: Normal heart sounds.   Pulmonary:      Effort: Pulmonary effort is normal.      Breath sounds: Normal breath sounds. No wheezing, rhonchi or rales.   Musculoskeletal:      Right ankle: Swelling present.      Left ankle: Swelling present.      Comments: Trace of bilateral LE edema non pitting.   Neurological:      General: No focal deficit present.      Mental Status: She is alert.   Psychiatric:         Thought Content: Thought content normal.        Result Review :  The following data was reviewed by: CHAPARRO Spring on 10/24/2023:  Common labs          1/25/2023    15:32 8/15/2023    09:35 10/20/2023    12:31   Common Labs   Glucose 94   86    BUN 11   13    Creatinine 0.94   0.93    Sodium 141   139    Potassium 3.9   3.9    Chloride 111   108    Calcium 9.6   9.8    Albumin 5.0   5.0    Total Bilirubin 0.8   0.5    Alkaline Phosphatase 64   74    AST (SGOT) 16   13    ALT (SGPT) 14   16    WBC 5.68  6.34     Hemoglobin 14.3  10.8     Hematocrit 40.4  31.2     Platelets 264  252     Total Cholesterol 172      Triglycerides 55      HDL Cholesterol 56      LDL Cholesterol  105        TSH          1/25/2023    15:32 10/20/2023    12:31   TSH   TSH 0.963  0.935                  Assessment and Plan   Diagnoses and all orders for this visit:    1. Hypothyroidism, unspecified type (Primary)    2. Seasonal allergic rhinitis due to pollen    3. Vitamin D deficiency    4. Hx of migraine headaches    5. Body fluid " retention    Other orders  -     vitamin D (ERGOCALCIFEROL) 1.25 MG (31368 UT) capsule capsule; Take 1 capsule by mouth 1 (One) Time Per Week. Take with a fatty meal.  Dispense: 12 capsule; Refill: 0  -     spironolactone (ALDACTONE) 25 MG tablet; Take 1/2 tablet every morning as needed for fluid retention.  Dispense: 30 tablet; Refill: 2      Hypothyroidism: Levels are normal. Stable on levothyroxine 50 mcg daily and to continue.      Allergies: Stable on cetirizine 10 mg daily and to continue.     Vitamin D deficiency: Level is low at 28.7. Start vitamin D supplement of 50,000 weekly with fatty meal for 3 months and then take otc supplement of 1000 to 2000 units daily.     Migraines: Not having migraines takes topiramate 100 mg BID for mood.     Fluid retention: Spirolactone 25 mg half tab every morning prn fluid retention.      Takes clonidine 0.1 mg qhs for sleep and topiramate 200 mg BID and seroquel 600 mg qhs for bipolar given by psych.      BMI is >= 25 and <30. (Overweight) The following options were offered after discussion;: exercise counseling/recommendations, nutrition counseling/recommendations, and information on healthy weight added to patient's after visit summary        Patient was given instructions and counseling regarding her condition or for health maintenance advice. Please see specific information pulled into the AVS if appropriate.     Follow Up   Return in about 6 months (around 4/24/2024) for Annual physical.    Dictated Utilizing Dragon Dictation.  Please note that portions of this note were completed with a voice recognition program.  Part of this note may be an electronic transcription/translation of spoken language to printed text using the Dragon Dictation System.    CHAPARRO Spring

## 2023-10-25 ENCOUNTER — TELEPHONE (OUTPATIENT)
Dept: INTERNAL MEDICINE | Facility: CLINIC | Age: 38
End: 2023-10-25
Payer: MEDICAID

## 2023-10-25 NOTE — TELEPHONE ENCOUNTER
Caller: Krupa Concepcion    Relationship to patient: Self    Best call back number: [phone]     Patient is needing: PATIENT HAS QUESTIONS ABOUT HER SUPPLEMENTS THAT ARE TO BE TAKEN AFTER THE VITAMIN D THAT WAS PRESCRIBED. SHE HAS QUESTIONS ABOUT THE UNITS. PLEASE CALL TO ADVISE.

## 2023-10-26 ENCOUNTER — TELEPHONE (OUTPATIENT)
Dept: INTERNAL MEDICINE | Facility: CLINIC | Age: 38
End: 2023-10-26
Payer: MEDICAID

## 2023-10-26 NOTE — TELEPHONE ENCOUNTER
----- Message from Naida Castellanos MA sent at 10/26/2023 10:58 AM EDT -----  Regarding: FW: Medication   Contact: [phone]    ----- Message -----  From: Krupa Concepcion  Sent: 10/26/2023  10:01 AM EDT  To: [facility]ev[facility] [place]  Subject: Medication                                       I noticed on my print out that my medication said a few things different. I actually take a 10 mg melatonin gummy  Quetiapine 200mg 3 tablets at nighttime regular dose  Sodium fluoride 5000 ppm 1.1 % gel morn and night   Multi vitamin is a women’s with minerals   The polyethylene is as needed as is the beneficent gummy  I also take 1 to 3 docaste stool softeners gel cap as needed morn or night. Thank you and have a nice day. That medication was missing     Krupa Concepcion

## 2023-10-27 RX ORDER — DOCUSATE SODIUM 100 MG/1
100 CAPSULE, LIQUID FILLED ORAL 3 TIMES DAILY PRN
COMMUNITY

## 2023-11-13 ENCOUNTER — TELEPHONE (OUTPATIENT)
Dept: INTERNAL MEDICINE | Facility: CLINIC | Age: 38
End: 2023-11-13
Payer: MEDICAID

## 2023-11-13 NOTE — TELEPHONE ENCOUNTER
Called and LM for the patient asking if she was wanting to get the RSV vaccine and pneumonia vaccine. Also asked if her pharmacy needed a prescription sent in for these.       RELAY

## 2023-11-14 NOTE — TELEPHONE ENCOUNTER
Name: JONO SALAS    Relationship: PATIENT    Best Callback Number: [phone]    HUB PROVIDED THE RELAY MESSAGE FROM THE OFFICE   PATIENT HAS FURTHER QUESTIONS AND WOULD LIKE A CALL BACK AT THE FOLLOWING PHONE NUMBER [phone]    ADDITIONAL INFORMATION: PATIENT WENT TO THE PHARMACY AT [facility]. SHE WAS RECOMMENDED FOR THE FOLLOWING OTHER VACCINES: PNEUMONIA SHOT, MEASLES MUMPS RUBELLA AND VARICELLA. NOT SURE WHAT PRESCRIPTIONS FOR WHAT SHOTS WERE SENT TO PHARMACY. TRANSFERRED TO CLINICAL. HUB TO RELAY WAS READ TO PATIENT VERBATIM.

## 2023-11-15 RX ORDER — FAMOTIDINE 20 MG/1
20 TABLET, FILM COATED ORAL EVERY 12 HOURS
Qty: 60 TABLET | Refills: 0 | Status: SHIPPED | OUTPATIENT
Start: 2023-11-15

## 2023-11-27 RX ORDER — FLUTICASONE PROPIONATE 50 MCG
SPRAY, SUSPENSION (ML) NASAL
Qty: 16 G | Refills: 3 | Status: SHIPPED | OUTPATIENT
Start: 2023-11-27

## 2023-12-19 NOTE — PROGRESS NOTES
Chief Complaint  URI (The patient is complaining of congestion, and not being able to keep anything down. She was COVID and flu tested at the ER last week. Patient states this is just a checking. )    Subjective    History of Present Illness:  Krupa Concepcion presents to [facility] for follow-up from ED for viral infection.  COVID and flu test were negative.  She was seen in the emergency department twice and given IV fluids for dehydration due to nausea vomiting.  The patient reports all of her symptoms have resolved.  The patient also request follow-up and refills for hypothyroidism, allergies, vitamin D deficiency, chronic migraines, fluid retention and GERD.  The patient reports doing well on her medications.      Past Medical History:   Diagnosis Date    Abnormal Pap smear of cervix     Anesthesia     SLOW TO WAKE UP AND IS EMOTIONAL    Anxiety     Arrhythmia     Asthma     AS A CHILD    Chest pain 08/14/2019    HX OF CHEST PAIN. SAW DR GARCIA IN 2020 PT REPORTS HAD CARDIAC TESTING AND WAS RELEASED. FOLLOWED BY PCP. DENIES CP/SOA AND IS ACTIVE    Depression     Disease of thyroid gland     GERD (gastroesophageal reflux disease)     HPV (human papilloma virus) infection     Migraine without aura     Schizoaffective disorder     REPORTS IS BEING EVALUATED    Trauma         Past Surgical History:   Procedure Laterality Date    BREAST BIOPSY Left 2023    (-)    CARDIAC CATHETERIZATION N/A 07/08/2020    Procedure: Left Heart Cath with Coronary Angiography;  Surgeon: Lilibeth Garcia MD;  Location: [facility] INVASIVE LOCATION;  Service: Cardiovascular;  Laterality: N/A;    CERVICAL CONIZATION, LEEP      COLONOSCOPY      D & C HYSTEROSCOPY N/A 8/15/2023    Procedure: DILATATION AND CURETTAGE HYSTEROSCOPY, IUD REMOVAL;  Surgeon: Aye Vasquez DO;  Location: [facility] MAIN OR;  Service: Gynecology;  Laterality: N/A;    DENTAL PROCEDURE      EAR TUBES Bilateral     x2 times     ENDOSCOPY      SALPINGECTOMY Bilateral 8/15/2023    Procedure: SALPINGECTOMY LAPAROSCOPIC;  Surgeon: Aye Vasquez DO;  Location: [facility] MAIN OR;  Service: Gynecology;  Laterality: Bilateral;    TONSILLECTOMY          Allergies   Allergen Reactions    Tea Tree Oil Anaphylaxis    Zolpidem Tartrate Mental Status Change    Trazodone Other (See Comments)     Leg cramping     Benzonatate Other (See Comments)     Does not work;pt states she is not allergic.     Cefdinir Hallucinations     hallucinations    Cromolyn Rash          Current Outpatient Medications:     cetirizine (zyrTEC) 5 MG tablet, Take 1 tablet by mouth Daily., Disp: 90 tablet, Rfl: 1    cloNIDine (CATAPRES) 0.1 MG tablet, Take 1 tablet by mouth every night at bedtime., Disp: 30 tablet, Rfl: 0    docusate sodium (COLACE) 100 MG capsule, Take 1 capsule by mouth 3 (Three) Times a Day As Needed for Constipation., Disp: , Rfl:     famotidine (PEPCID) 20 MG tablet, Take 1 tablet by mouth Every 12 (Twelve) Hours., Disp: 180 tablet, Rfl: 1    fluticasone (FLONASE) 50 MCG/ACT nasal spray, Instill 2 sprays in each nostril every evening as needed., Disp: 16 g, Rfl: 3    levothyroxine (SYNTHROID, LEVOTHROID) 50 MCG tablet, Take 1 tablet by mouth Every Morning., Disp: 90 tablet, Rfl: 1    MELATONIN GUMMIES PO, Take 10 mg by mouth As Needed (as needed for sleep)., Disp: , Rfl:     multivitamin with minerals tablet tablet, Take 1 tablet by mouth Daily., Disp: , Rfl:     ondansetron ODT (ZOFRAN-ODT) 4 MG disintegrating tablet, Take 1 tablet by mouth Every 8 (Eight) Hours As Needed., Disp: , Rfl:     polyethylene glycol (MIRALAX) 17 g packet, Take 17 g by mouth Daily As Needed (as needed for constipation)., Disp: , Rfl:     promethazine (PHENERGAN) 25 MG tablet, Take 1 tablet by mouth., Disp: , Rfl:     QUEtiapine (SEROquel) 200 MG tablet, Take 3 tablets by mouth Every Night., Disp: , Rfl:     Sodium Fluoride 5000 PPM 1.1 % gel, Apply 1.1 application  topically Every  "Night., Disp: , Rfl:     spironolactone (ALDACTONE) 25 MG tablet, Take 1/2 tablet every morning as needed for fluid retention., Disp: 45 tablet, Rfl: 1    topiramate (TOPAMAX) 100 MG tablet, Take 1 tablet by mouth 2 (Two) Times a Day., Disp: 180 tablet, Rfl: 1    Wheat Dextrin (Benefiber) chewable tablet, Chew., Disp: , Rfl:     Cholecalciferol 25 MCG (1000 UT) tablet, Take 2 tablets by mouth Daily., Disp: , Rfl:     Objective   Vital Signs:   /70 (BP Location: Right arm, Patient Position: Sitting, Cuff Size: Adult)   Pulse 89   Temp 98 °F (36.7 °C) (Temporal)   Ht 149.9 cm (59\")   Wt 62.1 kg (137 lb)   SpO2 98%   BMI 27.67 kg/m²       Physical Exam  Vitals reviewed.   Constitutional:       General: She is not in acute distress.  HENT:      Head: Normocephalic and atraumatic.      Right Ear: Tympanic membrane and ear canal normal.      Left Ear: Tympanic membrane and ear canal normal.   Neck:      Comments: No thyroid enlargement  Cardiovascular:      Rate and Rhythm: Normal rate and regular rhythm.   Pulmonary:      Effort: Pulmonary effort is normal.      Breath sounds: Normal breath sounds. No wheezing, rhonchi or rales.   Abdominal:      General: There is no distension.      Palpations: Abdomen is soft. There is no mass.      Tenderness: There is no abdominal tenderness.   Musculoskeletal:      Right lower leg: No edema.      Left lower leg: No edema.   Lymphadenopathy:      Cervical: No cervical adenopathy.   Skin:     General: Skin is warm and dry.   Neurological:      General: No focal deficit present.      Mental Status: She is alert.   Psychiatric:         Thought Content: Thought content normal.             Assessment and Plan:  Diagnoses and all orders for this visit:    1. Viral infection (Primary)    2. Hypothyroidism, unspecified type  -     T4, Free; Future  -     TSH; Future    3. Seasonal allergic rhinitis due to pollen    4. Vitamin D deficiency  -     Vitamin D,25-Hydroxy; Future    5. " Hx of migraine headaches    6. Body fluid retention  -     Comprehensive Metabolic Panel; Future  -     CBC & Differential; Future  -     Lipid Panel; Future  -     Magnesium; Future    7. Gastroesophageal reflux disease without esophagitis  -     Vitamin B12; Future  -     Folate; Future    8. Bipolar 1 disorder, mixed    Other orders  -     spironolactone (ALDACTONE) 25 MG tablet; Take 1/2 tablet every morning as needed for fluid retention.  Dispense: 45 tablet; Refill: 1  -     famotidine (PEPCID) 20 MG tablet; Take 1 tablet by mouth Every 12 (Twelve) Hours.  Dispense: 180 tablet; Refill: 1  -     fluticasone (FLONASE) 50 MCG/ACT nasal spray; Instill 2 sprays in each nostril every evening as needed.  Dispense: 16 g; Refill: 3  -     levothyroxine (SYNTHROID, LEVOTHROID) 50 MCG tablet; Take 1 tablet by mouth Every Morning.  Dispense: 90 tablet; Refill: 1  -     topiramate (TOPAMAX) 100 MG tablet; Take 1 tablet by mouth 2 (Two) Times a Day.  Dispense: 180 tablet; Refill: 1  -     cetirizine (zyrTEC) 5 MG tablet; Take 1 tablet by mouth Daily.  Dispense: 90 tablet; Refill: 1      Viral infection: Symptoms resolved and patient doing well now.    Hypothyroidism: Stable on levothyroxine 50 mcg daily and to continue.      Allergies: Stable on cetirizine 5 mg daily and to continue.  Fluticasone spray as needed.     Vitamin D deficiency: Taking otc supplement of 2000 units daily.      Migraines: Not having migraines takes topiramate 100 mg BID for mood.      Fluid retention: Stable on spirolactone 25 mg half tab every morning prn fluid retention.     GERD: Improved on Pepcid 20 mg twice daily.  Continue current treatment plan.     Bipolar: Takes clonidine 0.1 mg qhs for sleep and topiramate 200 mg BID and seroquel 600 mg qhs.  Following with psychiatry.      Patient was given instructions and counseling regarding condition.     Follow Up  Annual physical and follow-up in 4 months.    Dictated Utilizing Dragon Dictation.   Please note that portions of this note were completed with a voice recognition program.  Part of this note may be an electronic transcription/translation of spoken language to printed text using the Dragon Dictation System.    CHAPARRO Spring

## 2023-12-20 ENCOUNTER — OFFICE VISIT (OUTPATIENT)
Dept: INTERNAL MEDICINE | Facility: CLINIC | Age: 38
End: 2023-12-20
Payer: MEDICAID

## 2023-12-20 VITALS
DIASTOLIC BLOOD PRESSURE: 70 MMHG | HEART RATE: 89 BPM | HEIGHT: 59 IN | OXYGEN SATURATION: 98 % | TEMPERATURE: 98 F | SYSTOLIC BLOOD PRESSURE: 100 MMHG | WEIGHT: 137 LBS | BODY MASS INDEX: 27.62 KG/M2

## 2023-12-20 DIAGNOSIS — Z86.69 HX OF MIGRAINE HEADACHES: Chronic | ICD-10-CM

## 2023-12-20 DIAGNOSIS — J30.1 SEASONAL ALLERGIC RHINITIS DUE TO POLLEN: Chronic | ICD-10-CM

## 2023-12-20 DIAGNOSIS — B34.9 VIRAL INFECTION: Primary | ICD-10-CM

## 2023-12-20 DIAGNOSIS — K21.9 GASTROESOPHAGEAL REFLUX DISEASE WITHOUT ESOPHAGITIS: Chronic | ICD-10-CM

## 2023-12-20 DIAGNOSIS — E03.9 HYPOTHYROIDISM, UNSPECIFIED TYPE: Chronic | ICD-10-CM

## 2023-12-20 DIAGNOSIS — F31.60 BIPOLAR 1 DISORDER, MIXED: ICD-10-CM

## 2023-12-20 DIAGNOSIS — R60.9 BODY FLUID RETENTION: ICD-10-CM

## 2023-12-20 DIAGNOSIS — E55.9 VITAMIN D DEFICIENCY: ICD-10-CM

## 2023-12-20 PROCEDURE — 1159F MED LIST DOCD IN RCRD: CPT | Performed by: NURSE PRACTITIONER

## 2023-12-20 PROCEDURE — 99214 OFFICE O/P EST MOD 30 MIN: CPT | Performed by: NURSE PRACTITIONER

## 2023-12-20 PROCEDURE — 1160F RVW MEDS BY RX/DR IN RCRD: CPT | Performed by: NURSE PRACTITIONER

## 2023-12-20 RX ORDER — CETIRIZINE HYDROCHLORIDE 5 MG/1
5 TABLET ORAL DAILY
COMMUNITY
End: 2023-12-20 | Stop reason: SDUPTHER

## 2023-12-20 RX ORDER — LEVOTHYROXINE SODIUM 0.05 MG/1
50 TABLET ORAL
Qty: 90 TABLET | Refills: 1 | Status: SHIPPED | OUTPATIENT
Start: 2023-12-20

## 2023-12-20 RX ORDER — FAMOTIDINE 20 MG/1
20 TABLET, FILM COATED ORAL EVERY 12 HOURS
Qty: 180 TABLET | Refills: 1 | Status: SHIPPED | OUTPATIENT
Start: 2023-12-20

## 2023-12-20 RX ORDER — ONDANSETRON 4 MG/1
4 TABLET, ORALLY DISINTEGRATING ORAL EVERY 8 HOURS PRN
COMMUNITY
Start: 2023-12-13

## 2023-12-20 RX ORDER — AZITHROMYCIN 250 MG/1
TABLET, FILM COATED ORAL
Qty: 6 TABLET | Refills: 0 | Status: CANCELLED | OUTPATIENT
Start: 2023-12-20

## 2023-12-20 RX ORDER — MELATONIN
2000 DAILY
COMMUNITY

## 2023-12-20 RX ORDER — FLUTICASONE PROPIONATE 50 MCG
SPRAY, SUSPENSION (ML) NASAL
Qty: 16 G | Refills: 3 | Status: SHIPPED | OUTPATIENT
Start: 2023-12-20

## 2023-12-20 RX ORDER — SPIRONOLACTONE 25 MG/1
TABLET ORAL
Qty: 45 TABLET | Refills: 1 | Status: SHIPPED | OUTPATIENT
Start: 2023-12-20

## 2023-12-20 RX ORDER — TOPIRAMATE 100 MG/1
100 TABLET, FILM COATED ORAL 2 TIMES DAILY
Qty: 180 TABLET | Refills: 1 | Status: SHIPPED | OUTPATIENT
Start: 2023-12-20

## 2023-12-20 RX ORDER — PROMETHAZINE HYDROCHLORIDE 25 MG/1
25 TABLET ORAL
COMMUNITY
Start: 2023-12-16 | End: 2023-12-24

## 2023-12-20 RX ORDER — CETIRIZINE HYDROCHLORIDE 5 MG/1
5 TABLET ORAL DAILY
Qty: 90 TABLET | Refills: 1 | Status: SHIPPED | OUTPATIENT
Start: 2023-12-20

## 2023-12-21 ENCOUNTER — TELEPHONE (OUTPATIENT)
Dept: INTERNAL MEDICINE | Facility: CLINIC | Age: 38
End: 2023-12-21

## 2023-12-21 RX ORDER — MAGNESIUM HYDROXIDE 1200 MG/15ML
1 LIQUID ORAL ONCE
Qty: 1 ENEMA | Refills: 1 | Status: SHIPPED | OUTPATIENT
Start: 2023-12-21 | End: 2023-12-21

## 2023-12-21 NOTE — TELEPHONE ENCOUNTER
Caller: Krupa Concepcion    Relationship: Self    Best call back number:     [phone]       What medication are you requesting: ENEMA    What are your current symptoms: CONSTIPATION    How long have you been experiencing symptoms: SINCE THANKSGIVING    Have you had these symptoms before:    [x] Yes  [] No    Have you been treated for these symptoms before:   [x] Yes  [] No    If a prescription is needed, what is your preferred pharmacy and phone number: [facility] #[id number] [address] - [phone] [facility] [phone] FX     Additional notes: PATIENT STATES THAT MS. CAMARILLO TOLD HER TO CALL IN AND ASK FOR THIS IF SHE WAS STILL HAVING SYMPTOMS. PATIENT STATES THAT SHE WAS TOLD THIS WAS SOMETHING SHE COULD DO AT HOME.

## 2023-12-21 NOTE — TELEPHONE ENCOUNTER
Caller: Krupa Concepcion    Relationship to patient: Self    Best call back number:     [phone]       Patient is needing: PATIENT CALLED TO CHECK ON THIS. SHE STATES THAT SHE NEEDS THIS CALLED IN TODAY IF POSSIBLE.

## 2024-01-11 RX ORDER — ERGOCALCIFEROL 1.25 MG/1
CAPSULE ORAL
Qty: 12 CAPSULE | Refills: 0 | OUTPATIENT
Start: 2024-01-11

## 2024-01-17 RX ORDER — FAMOTIDINE 20 MG/1
20 TABLET, FILM COATED ORAL EVERY 12 HOURS
Qty: 180 TABLET | Refills: 1 | Status: SHIPPED | OUTPATIENT
Start: 2024-01-17

## 2024-01-17 NOTE — TELEPHONE ENCOUNTER
Caller: Krupa Concepcion    Relationship: Self    Best call back number:     [phone]       Requested Prescriptions:   Requested Prescriptions     Pending Prescriptions Disp Refills    famotidine (PEPCID) 20 MG tablet 180 tablet 1     Sig: Take 1 tablet by mouth Every 12 (Twelve) Hours.        Pharmacy where request should be sent: [facility] #[id number] - [place] [address] [facility] AT [address]  [address] [facility] [phone] [facility] [phone] FX     Last office visit with prescribing clinician: 12/20/2023   Last telemedicine visit with prescribing clinician: Visit date not found   Next office visit with prescribing clinician: 4/24/2024     Additional details provided by patient: PATIENT STATES THIS MAY NEED A PRIOR AUTHORIZATION, SHE IS DOWN TO 1 DAY SUPPLY    Does the patient have less than a 3 day supply:  [x] Yes  [] No    Would you like a call back once the refill request has been completed: [x] Yes [] No    If the office needs to give you a call back, can they leave a voicemail: [x] Yes [] No    Vicente England Rep   01/17/24 10:10 EST

## 2024-01-24 RX ORDER — FLUTICASONE PROPIONATE 50 MCG
SPRAY, SUSPENSION (ML) NASAL
Qty: 48 G | Refills: 1 | Status: SHIPPED | OUTPATIENT
Start: 2024-01-24

## 2024-01-29 ENCOUNTER — TRANSCRIBE ORDERS (OUTPATIENT)
Dept: ADMINISTRATIVE | Facility: HOSPITAL | Age: 39
End: 2024-01-29
Payer: MEDICAID

## 2024-01-29 DIAGNOSIS — Z12.31 ENCOUNTER FOR SCREENING MAMMOGRAM FOR MALIGNANT NEOPLASM OF BREAST: Primary | ICD-10-CM

## 2024-02-14 ENCOUNTER — TELEPHONE (OUTPATIENT)
Dept: INTERNAL MEDICINE | Facility: CLINIC | Age: 39
End: 2024-02-14
Payer: MEDICAID

## 2024-02-14 RX ORDER — POLYETHYLENE GLYCOL 3350 17 G/17G
17 POWDER, FOR SOLUTION ORAL DAILY PRN
Qty: 100 EACH | Refills: 3 | Status: SHIPPED | OUTPATIENT
Start: 2024-02-14

## 2024-03-07 ENCOUNTER — TELEPHONE (OUTPATIENT)
Dept: INTERNAL MEDICINE | Age: 39
End: 2024-03-07

## 2024-03-07 NOTE — TELEPHONE ENCOUNTER
Caller: Krupa Concepcion    Relationship to patient: Self    Best call back number: [phone]    Patient is needing: PATIENT CALLING TO MAKE SURE HER LAB WORK FOR HER UPCOMING APPOINTMENT IN APRIL WILL BE CHECKING HER CHOLESTEROL LEVELS. PLEASE CALL PATIENT BACK AT THE NUMBER LISTED ABOVE TO LET HER KNOW FOR SURE.

## 2024-04-01 DIAGNOSIS — J30.1 SEASONAL ALLERGIC RHINITIS DUE TO POLLEN: Primary | Chronic | ICD-10-CM

## 2024-04-01 RX ORDER — CETIRIZINE HYDROCHLORIDE 5 MG/1
5 TABLET ORAL DAILY
Qty: 90 TABLET | Refills: 1 | Status: SHIPPED | OUTPATIENT
Start: 2024-04-01 | End: 2024-04-01 | Stop reason: SDUPTHER

## 2024-04-01 RX ORDER — CETIRIZINE HYDROCHLORIDE 5 MG/1
5 TABLET ORAL DAILY
Qty: 90 TABLET | Refills: 1 | Status: SHIPPED | OUTPATIENT
Start: 2024-04-01 | End: 2024-04-01

## 2024-04-02 RX ORDER — CETIRIZINE HYDROCHLORIDE 5 MG/1
5 TABLET ORAL DAILY
Qty: 90 TABLET | Refills: 1 | Status: SHIPPED | OUTPATIENT
Start: 2024-04-02

## 2024-04-05 RX ORDER — SPIRONOLACTONE 25 MG/1
TABLET ORAL
Qty: 30 TABLET | Refills: 1 | Status: SHIPPED | OUTPATIENT
Start: 2024-04-05 | End: 2024-04-05 | Stop reason: SDUPTHER

## 2024-04-05 RX ORDER — SPIRONOLACTONE 25 MG/1
TABLET ORAL
Qty: 30 TABLET | Refills: 1 | Status: SHIPPED | OUTPATIENT
Start: 2024-04-05

## 2024-04-10 ENCOUNTER — LAB (OUTPATIENT)
Dept: INTERNAL MEDICINE | Age: 39
End: 2024-04-10
Payer: MEDICAID

## 2024-04-10 DIAGNOSIS — K21.9 GASTROESOPHAGEAL REFLUX DISEASE WITHOUT ESOPHAGITIS: Chronic | ICD-10-CM

## 2024-04-10 DIAGNOSIS — E55.9 VITAMIN D DEFICIENCY: ICD-10-CM

## 2024-04-10 DIAGNOSIS — E03.9 HYPOTHYROIDISM, UNSPECIFIED TYPE: Chronic | ICD-10-CM

## 2024-04-10 DIAGNOSIS — R60.9 BODY FLUID RETENTION: ICD-10-CM

## 2024-04-10 LAB
25(OH)D3 SERPL-MCNC: 38.9 NG/ML (ref 30–100)
ALBUMIN SERPL-MCNC: 4.6 G/DL (ref 3.5–5.2)
ALBUMIN/GLOB SERPL: 1.7 G/DL
ALP SERPL-CCNC: 66 U/L (ref 39–117)
ALT SERPL W P-5'-P-CCNC: 12 U/L (ref 1–33)
ANION GAP SERPL CALCULATED.3IONS-SCNC: 12.9 MMOL/L (ref 5–15)
AST SERPL-CCNC: 12 U/L (ref 1–32)
BILIRUB SERPL-MCNC: 0.7 MG/DL (ref 0–1.2)
BUN SERPL-MCNC: 14 MG/DL (ref 6–20)
BUN/CREAT SERPL: 14.7 (ref 7–25)
CALCIUM SPEC-SCNC: 8.9 MG/DL (ref 8.6–10.5)
CHLORIDE SERPL-SCNC: 110 MMOL/L (ref 98–107)
CHOLEST SERPL-MCNC: 158 MG/DL (ref 0–200)
CO2 SERPL-SCNC: 18.1 MMOL/L (ref 22–29)
CREAT SERPL-MCNC: 0.95 MG/DL (ref 0.57–1)
EGFRCR SERPLBLD CKD-EPI 2021: 77.4 ML/MIN/1.73
FOLATE SERPL-MCNC: >20 NG/ML (ref 4.78–24.2)
GLOBULIN UR ELPH-MCNC: 2.7 GM/DL
GLUCOSE SERPL-MCNC: 80 MG/DL (ref 65–99)
HDLC SERPL-MCNC: 47 MG/DL (ref 40–60)
LDLC SERPL CALC-MCNC: 100 MG/DL (ref 0–100)
LDLC/HDLC SERPL: 2.13 {RATIO}
MAGNESIUM SERPL-MCNC: 2.4 MG/DL (ref 1.6–2.6)
POTASSIUM SERPL-SCNC: 3.6 MMOL/L (ref 3.5–5.2)
PROT SERPL-MCNC: 7.3 G/DL (ref 6–8.5)
SODIUM SERPL-SCNC: 141 MMOL/L (ref 136–145)
T4 FREE SERPL-MCNC: 1.02 NG/DL (ref 0.93–1.7)
TRIGL SERPL-MCNC: 55 MG/DL (ref 0–150)
TSH SERPL DL<=0.05 MIU/L-ACNC: 1.62 UIU/ML (ref 0.27–4.2)
VIT B12 BLD-MCNC: 375 PG/ML (ref 211–946)
VLDLC SERPL-MCNC: 11 MG/DL (ref 5–40)

## 2024-04-10 PROCEDURE — 36415 COLL VENOUS BLD VENIPUNCTURE: CPT | Performed by: NURSE PRACTITIONER

## 2024-04-10 PROCEDURE — 80061 LIPID PANEL: CPT | Performed by: NURSE PRACTITIONER

## 2024-04-10 PROCEDURE — 82746 ASSAY OF FOLIC ACID SERUM: CPT | Performed by: NURSE PRACTITIONER

## 2024-04-10 PROCEDURE — 80053 COMPREHEN METABOLIC PANEL: CPT | Performed by: NURSE PRACTITIONER

## 2024-04-10 PROCEDURE — 83735 ASSAY OF MAGNESIUM: CPT | Performed by: NURSE PRACTITIONER

## 2024-04-10 PROCEDURE — 84439 ASSAY OF FREE THYROXINE: CPT | Performed by: NURSE PRACTITIONER

## 2024-04-10 PROCEDURE — 84443 ASSAY THYROID STIM HORMONE: CPT | Performed by: NURSE PRACTITIONER

## 2024-04-10 PROCEDURE — 82306 VITAMIN D 25 HYDROXY: CPT | Performed by: NURSE PRACTITIONER

## 2024-04-10 PROCEDURE — 82607 VITAMIN B-12: CPT | Performed by: NURSE PRACTITIONER

## 2024-04-23 NOTE — PROGRESS NOTES
Chief Complaint  Annual Exam (6 month physical, labs done.)  Subjective    History of Present Illness  Krupa Concepcion is a 39 y.o. female who presents to [facility] INTERNAL MEDICINE for Her annual physical exam and follow-up hypothyroidism, allergies, vitamin D deficiency, chronic migraines, fluid retention and GERD. The patient reports doing well on her medications.      Current Outpatient Medications:   •  cetirizine (zyrTEC) 5 MG tablet, Take 1 tablet by mouth Daily., Disp: 90 tablet, Rfl: 1  •  Cholecalciferol 25 MCG (1000 UT) tablet, Take 2 tablets by mouth Daily., Disp: , Rfl:   •  cloNIDine (CATAPRES) 0.1 MG tablet, Take 1 tablet by mouth every night at bedtime., Disp: 30 tablet, Rfl: 0  •  docusate sodium (COLACE) 100 MG capsule, Take 1 capsule by mouth 3 (Three) Times a Day As Needed for Constipation., Disp: , Rfl:   •  famotidine (PEPCID) 20 MG tablet, Take 1 tablet by mouth Every 12 (Twelve) Hours., Disp: 180 tablet, Rfl: 1  •  fluticasone (FLONASE) 50 MCG/ACT nasal spray, SHAKE LIQUID AND USE 2 SPRAYS IN EACH NOSTRIL EVERY EVENING AS NEEDED, Disp: 48 g, Rfl: 1  •  levothyroxine (SYNTHROID, LEVOTHROID) 50 MCG tablet, Take 1 tablet by mouth Every Morning., Disp: 90 tablet, Rfl: 1  •  MELATONIN GUMMIES PO, Take 10 mg by mouth As Needed (as needed for sleep)., Disp: , Rfl:   •  multivitamin with minerals tablet tablet, Take 1 tablet by mouth Daily., Disp: , Rfl:   •  ondansetron ODT (ZOFRAN-ODT) 4 MG disintegrating tablet, Take 1 tablet by mouth Every 8 (Eight) Hours As Needed., Disp: , Rfl:   •  polyethylene glycol (MIRALAX) 17 g packet, Take 17 g by mouth Daily As Needed (as needed for constipation)., Disp: 100 each, Rfl: 3  •  QUEtiapine (SEROquel) 200 MG tablet, Take 3 tablets by mouth Every Night., Disp: , Rfl:   •  Sodium Fluoride 5000 PPM 1.1 % gel, Apply 1.1 Applications topically Every Night., Disp: , Rfl:   •  spironolactone (ALDACTONE) 25 MG tablet, TAKE 1/2 TABLET BY MOUTH  EVERY MORNING AS NEEDED FOR FLUID RETENTION, Disp: 30 tablet, Rfl: 1  •  topiramate (TOPAMAX) 100 MG tablet, Take 1 tablet by mouth 2 (Two) Times a Day., Disp: 180 tablet, Rfl: 1  •  Wheat Dextrin (Benefiber) chewable tablet, Chew., Disp: , Rfl:   Allergies   Allergen Reactions   • Tea Tree Oil Anaphylaxis   • Zolpidem Tartrate Mental Status Change   • Trazodone Other (See Comments)     Leg cramping    • Benzonatate Other (See Comments)     Does not work;pt states she is not allergic.    • Cefdinir Hallucinations     hallucinations   • Cromolyn Rash      Past Medical History:   Diagnosis Date   • Abnormal Pap smear of cervix    • Anesthesia     SLOW TO WAKE UP AND IS EMOTIONAL   • Anxiety    • Arrhythmia    • Asthma     AS A CHILD   • Chest pain 08/14/2019    HX OF CHEST PAIN. SAW DR GARCIA IN 2020 PT REPORTS HAD CARDIAC TESTING AND WAS RELEASED. FOLLOWED BY PCP. DENIES CP/SOA AND IS ACTIVE   • Depression    • Disease of thyroid gland    • GERD (gastroesophageal reflux disease)    • HPV (human papilloma virus) infection    • Migraine without aura    • Schizoaffective disorder     REPORTS IS BEING EVALUATED   • Trauma       Past Surgical History:   Procedure Laterality Date   • BREAST BIOPSY Left 2023    (-)   • CARDIAC CATHETERIZATION N/A 07/08/2020    Procedure: Left Heart Cath with Coronary Angiography;  Surgeon: Lilibeth Garcia MD;  Location: [facility] INVASIVE LOCATION;  Service: Cardiovascular;  Laterality: N/A;   • CERVICAL CONIZATION, LEEP     • COLONOSCOPY     • D & C HYSTEROSCOPY N/A 8/15/2023    Procedure: DILATATION AND CURETTAGE HYSTEROSCOPY, IUD REMOVAL;  Surgeon: Aye Vasquez DO;  Location: [facility] OR;  Service: Gynecology;  Laterality: N/A;   • DENTAL PROCEDURE     • EAR TUBES Bilateral     x2 times   • ENDOSCOPY     • SALPINGECTOMY Bilateral 8/15/2023    Procedure: SALPINGECTOMY LAPAROSCOPIC;  Surgeon: Aye Vasquez DO;  Location: [facility] OR;  Service: Gynecology;  Laterality:  "Bilateral;   • TONSILLECTOMY          Objective   /70 (BP Location: Left arm, Patient Position: Sitting, Cuff Size: Adult)   Pulse 78   Temp 97.3 °F (36.3 °C) (Temporal)   Ht 149.9 cm (59\")   Wt 59.8 kg (131 lb 12.8 oz)   SpO2 98%   BMI 26.62 kg/m²    Estimated body mass index is 26.62 kg/m² as calculated from the following:    Height as of this encounter: 149.9 cm (59\").    Weight as of this encounter: 59.8 kg (131 lb 12.8 oz).     Physical Exam  Vitals reviewed. Exam conducted with a chaperone present.   Constitutional:       General: She is not in acute distress.  HENT:      Head: Normocephalic and atraumatic.      Right Ear: Tympanic membrane and ear canal normal.      Left Ear: Tympanic membrane and ear canal normal.   Eyes:      Conjunctiva/sclera: Conjunctivae normal.   Cardiovascular:      Rate and Rhythm: Normal rate and regular rhythm.      Heart sounds: Normal heart sounds. No murmur heard.  Pulmonary:      Effort: Pulmonary effort is normal.      Breath sounds: Normal breath sounds. No wheezing, rhonchi or rales.   Chest:      Chest wall: No mass.   Breasts:     Right: Normal. No mass.      Left: Normal. No mass.   Abdominal:      General: There is no distension.      Palpations: Abdomen is soft. There is no mass.      Tenderness: There is no abdominal tenderness.   Musculoskeletal:      Right lower leg: No edema.      Left lower leg: No edema.   Lymphadenopathy:      Cervical: No cervical adenopathy.      Upper Body:      Right upper body: No supraclavicular, axillary or pectoral adenopathy.      Left upper body: No supraclavicular or axillary adenopathy.   Skin:     General: Skin is warm and dry.      Coloration: Skin is not jaundiced or pale.   Neurological:      General: No focal deficit present.      Mental Status: She is alert.   Psychiatric:         Mood and Affect: Mood normal.         Thought Content: Thought content normal.        Result Review :  The following data was reviewed by: " Imelda Greenwood, CHAPARRO on 04/24/2024:  Common labs          8/15/2023    09:35 10/20/2023    12:31 4/10/2024    10:56   Common Labs   Glucose  86  80    BUN  13  14    Creatinine  0.93  0.95    Sodium  139  141    Potassium  3.9  3.6    Chloride  108  110    Calcium  9.8  8.9    Albumin  5.0  4.6    Total Bilirubin  0.5  0.7    Alkaline Phosphatase  74  66    AST (SGOT)  13  12    ALT (SGPT)  16  12    WBC 6.34      Hemoglobin 10.8      Hematocrit 31.2      Platelets 252      Total Cholesterol   158    Triglycerides   55    HDL Cholesterol   47    LDL Cholesterol    100                  Assessment and Plan   Diagnoses and all orders for this visit:    1. Annual physical exam (Primary)    2. Hypothyroidism, unspecified type  -     T4, Free; Future  -     TSH; Future    3. Seasonal allergic rhinitis due to pollen    4. Vitamin D deficiency  -     Vitamin D,25-Hydroxy; Future    5. Hx of migraine headaches    6. Body fluid retention  -     Comprehensive Metabolic Panel; Future  -     CBC & Differential; Future      Annual exam: Care gaps reviewed. Labs reviewed and discussed.     Hypothyroidism: Levels are normal. Stable on levothyroxine 50 mcg daily and to continue.      Allergies: Stable on cetirizine 10 mg daily and to continue.      Vitamin D deficiency: Level is normal. Improved. Completed  vitamin D supplement of 50,000 weekly and now taking 1000 units daily.      Migraines: Not having migraines takes topiramate 100 mg BID for mood.      Fluid retention: Spirolactone 25 mg half tab every morning prn fluid retention.      Takes clonidine 0.1 mg qhs for sleep and topiramate 200 mg BID and seroquel 600 mg qhs for bipolar given by psych.    Patient was given instructions and counseling regarding her condition or for health maintenance advice. Please see specific information pulled into the AVS if appropriate.     Follow Up   Return in about 6 months (around 10/24/2024) for Recheck.    Dictated Utilizing Dragon  Dictation.  Please note that portions of this note were completed with a voice recognition program.  Part of this note may be an electronic transcription/translation of spoken language to printed text using the Dragon Dictation System.    CHAPARRO Spring

## 2024-04-24 ENCOUNTER — OFFICE VISIT (OUTPATIENT)
Dept: INTERNAL MEDICINE | Age: 39
End: 2024-04-24
Payer: MEDICAID

## 2024-04-24 VITALS
HEIGHT: 59 IN | BODY MASS INDEX: 26.57 KG/M2 | DIASTOLIC BLOOD PRESSURE: 70 MMHG | HEART RATE: 78 BPM | WEIGHT: 131.8 LBS | TEMPERATURE: 97.3 F | OXYGEN SATURATION: 98 % | SYSTOLIC BLOOD PRESSURE: 100 MMHG

## 2024-04-24 DIAGNOSIS — J30.1 SEASONAL ALLERGIC RHINITIS DUE TO POLLEN: Chronic | ICD-10-CM

## 2024-04-24 DIAGNOSIS — Z86.69 HX OF MIGRAINE HEADACHES: Chronic | ICD-10-CM

## 2024-04-24 DIAGNOSIS — E55.9 VITAMIN D DEFICIENCY: ICD-10-CM

## 2024-04-24 DIAGNOSIS — R60.9 BODY FLUID RETENTION: ICD-10-CM

## 2024-04-24 DIAGNOSIS — E03.9 HYPOTHYROIDISM, UNSPECIFIED TYPE: Chronic | ICD-10-CM

## 2024-04-24 DIAGNOSIS — Z00.00 ANNUAL PHYSICAL EXAM: Primary | ICD-10-CM

## 2024-04-24 PROCEDURE — 1160F RVW MEDS BY RX/DR IN RCRD: CPT | Performed by: NURSE PRACTITIONER

## 2024-04-24 PROCEDURE — 99214 OFFICE O/P EST MOD 30 MIN: CPT | Performed by: NURSE PRACTITIONER

## 2024-04-24 PROCEDURE — 99396 PREV VISIT EST AGE 40-64: CPT | Performed by: NURSE PRACTITIONER

## 2024-06-17 RX ORDER — LEVOTHYROXINE SODIUM 0.05 MG/1
50 TABLET ORAL
Qty: 90 TABLET | Refills: 1 | Status: SHIPPED | OUTPATIENT
Start: 2024-06-17

## 2024-07-09 RX ORDER — LEVOTHYROXINE SODIUM 0.05 MG/1
50 TABLET ORAL DAILY
Qty: 90 TABLET | Refills: 1 | OUTPATIENT
Start: 2024-07-09

## 2024-07-09 RX ORDER — SPIRONOLACTONE 25 MG/1
TABLET ORAL
Qty: 30 TABLET | Refills: 1 | Status: SHIPPED | OUTPATIENT
Start: 2024-07-09 | End: 2024-07-09 | Stop reason: SDUPTHER

## 2024-07-09 RX ORDER — SPIRONOLACTONE 25 MG/1
TABLET ORAL
Qty: 30 TABLET | Refills: 1 | Status: SHIPPED | OUTPATIENT
Start: 2024-07-09

## 2024-07-09 NOTE — TELEPHONE ENCOUNTER
Caller: Krupa Concepcion    Relationship: Self    Best call back number: [phone]     Requested Prescriptions:   Requested Prescriptions     Pending Prescriptions Disp Refills    spironolactone (ALDACTONE) 25 MG tablet 30 tablet 1     Sig: TAKE 1/2 TABLET BY MOUTH EVERY MORNING AS NEEDED FOR FLUID RETENTION        Pharmacy where request should be sent: [facility] #[id number] - [address] - [phone] [facility] [phone] FX     Last office visit with prescribing clinician: 4/24/2024   Last telemedicine visit with prescribing clinician: Visit date not found   Next office visit with prescribing clinician: 10/15/2024     Would you like a call back once the refill request has been completed: [] Yes [] No    If the office needs to give you a call back, can they leave a voicemail: [] Yes [] No    Vicente Wang Rep   07/09/24 11:26 EDT

## 2024-07-09 NOTE — TELEPHONE ENCOUNTER
Caller: Krupa Concepcion    Relationship to patient: Self    Best call back number:     [phone]       Patient is needing: STATED SHE IS NEEDING PRIOR AUTHORIZATIONF OR THE FOLLOWING MEDICATIONS.  famotidine (PEPCID) 20 MG tablet       fluticasone (FLONASE) 50 MCG/ACT nasal spray     cetirizine (zyrTEC) 5 MG tablet     topiramate (TOPAMAX) 100 MG tablet     cloNIDine (CATAPRES) 0.1 MG tablet

## 2024-07-12 ENCOUNTER — TELEPHONE (OUTPATIENT)
Dept: INTERNAL MEDICINE | Age: 39
End: 2024-07-12
Payer: MEDICAID

## 2024-07-12 NOTE — TELEPHONE ENCOUNTER
I just tried to do one on all of those and every single one came back that its available without authorization they're all otc except clonidine

## 2024-07-12 NOTE — TELEPHONE ENCOUNTER
Caller: Krupa Concepcion A    Relationship to patient: Self    Best call back number: [phone]    Patient is needing: PATIENT CALLED TO CHECK ON THE STATUS OF HER PRIOR AUTHORIZATIONS THAT HAD BEEN REQUESTED FOR 5 OF HER MEDICATIONS IN A PREVIOUS ENCOUNTER. PATIENT STATES SHE RECEIVED A MESSAGE FROM THE PHARMACY SAYING THEY HAD NOT RECEIVED ANY AUTHORIZATION AS OF TODAY 7.12.24

## 2024-07-15 ENCOUNTER — TELEPHONE (OUTPATIENT)
Dept: INTERNAL MEDICINE | Age: 39
End: 2024-07-15

## 2024-07-15 NOTE — TELEPHONE ENCOUNTER
Caller: Krupa Concepcion    Relationship to patient: Self    Best call back number: [phone]     Patient is needing: PATIENT STATES THAT A PRIOR AUTHORIZATION FOR HER  fluticasone (FLONASE) 50 MCG/ACT nasal spray

## 2024-07-17 RX ORDER — FLUTICASONE PROPIONATE 50 MCG
SPRAY, SUSPENSION (ML) NASAL
Qty: 48 G | Refills: 3 | Status: SHIPPED | OUTPATIENT
Start: 2024-07-17

## 2024-07-17 NOTE — TELEPHONE ENCOUNTER
Rx Refill Note  Requested Prescriptions      No prescriptions requested or ordered in this encounter      Last office visit with prescribing clinician: 4/24/2024   Last telemedicine visit with prescribing clinician: Visit date not found   Next office visit with prescribing clinician: 10/15/2024                         Would you like a call back once the refill request has been completed: [] Yes [] No    If the office needs to give you a call back, can they leave a voicemail: [] Yes [] No    Maddison Malcolm MA  07/17/24, 14:54 EDT

## 2024-07-24 RX ORDER — SPIRONOLACTONE 25 MG/1
TABLET ORAL
Qty: 30 TABLET | Refills: 1 | OUTPATIENT
Start: 2024-07-24

## 2024-07-24 NOTE — TELEPHONE ENCOUNTER
Caller: Krupa Concepcion    Relationship: Self    Best call back number: [phone]     Requested Prescriptions:   Requested Prescriptions     Pending Prescriptions Disp Refills    spironolactone (ALDACTONE) 25 MG tablet 30 tablet 1     Sig: TAKE 1/2 TABLET BY MOUTH EVERY MORNING AS NEEDED FOR FLUID RETENTION      Pharmacy where request should be sent: [facility] #[id number] - [address] - [phone] [facility] [phone] FX     Last office visit with prescribing clinician: 4/24/2024   Last telemedicine visit with prescribing clinician: Visit date not found   Next office visit with prescribing clinician: 10/15/2024     Additional details provided by patient: PATIENT HAS 4 DAYS LEFT    Does the patient have less than a 3 day supply:  [] Yes  [x] No    Would you like a call back once the refill request has been completed: [] Yes [] No    If the office needs to give you a call back, can they leave a voicemail: [] Yes [] No    Vicente Sanches Rep   07/24/24 14:23 EDT

## 2024-08-19 ENCOUNTER — TELEPHONE (OUTPATIENT)
Dept: INTERNAL MEDICINE | Age: 39
End: 2024-08-19

## 2024-08-30 RX ORDER — SPIRONOLACTONE 25 MG/1
TABLET ORAL
Qty: 30 TABLET | Refills: 1 | Status: SHIPPED | OUTPATIENT
Start: 2024-08-30

## 2024-08-30 NOTE — TELEPHONE ENCOUNTER
Caller: Krupa Concepcion    Relationship: Self    Best call back number:     [phone]     Requested Prescriptions:   Requested Prescriptions     Pending Prescriptions Disp Refills    spironolactone (ALDACTONE) 25 MG tablet 30 tablet 1     Sig: TAKE 1/2 TABLET BY MOUTH EVERY MORNING AS NEEDED FOR FLUID RETENTION        Pharmacy where request should be sent: [facility] #[id number] - [address] - [phone] [facility] [phone] FX     Last office visit with prescribing clinician: 4/24/2024   Last telemedicine visit with prescribing clinician: Visit date not found   Next office visit with prescribing clinician: 10/15/2024     Additional details provided by patient:     Does the patient have less than a 3 day supply:  [] Yes  [] No    Would you like a call back once the refill request has been completed: [] Yes [] No    If the office needs to give you a call back, can they leave a voicemail: [] Yes [] No    Vicente Norman Rep   08/30/24 13:39 EDT

## 2024-09-13 DIAGNOSIS — J30.1 SEASONAL ALLERGIC RHINITIS DUE TO POLLEN: Chronic | ICD-10-CM

## 2024-09-13 RX ORDER — CETIRIZINE HYDROCHLORIDE 5 MG/1
5 TABLET ORAL DAILY
Qty: 90 TABLET | Refills: 1 | Status: SHIPPED | OUTPATIENT
Start: 2024-09-13

## 2024-09-13 NOTE — TELEPHONE ENCOUNTER
Caller: Krupa Concepcion    Relationship: Self    Best call back number: [phone]     Requested Prescriptions:   Requested Prescriptions     Pending Prescriptions Disp Refills    cetirizine (zyrTEC) 5 MG tablet 90 tablet 1     Sig: Take 1 tablet by mouth Daily.        Pharmacy where request should be sent: [facility] #[id number] - [place] [address] [facility] AT [address] [facility] [phone] [facility] [phone] FX     Last office visit with prescribing clinician: 4/24/2024   Last telemedicine visit with prescribing clinician: Visit date not found   Next office visit with prescribing clinician: 10/15/2024     Additional details provided by patient: PATIENT ONLY HAS A FEW DAYS LEFT OF MEDICATION. PATIENT RECEIVED TEXT MESSAGE SAYING SHE IS OUT OF REFILLS AND NEEDS A PRIOR AUTHORIZATION FOR MEDICATION.    Does the patient have less than a 3 day supply:  [x] Yes  [] No    Would you like a call back once the refill request has been completed: [] Yes [] No    If the office needs to give you a call back, can they leave a voicemail: [] Yes [] No    Vicente Hall Rep   09/13/24 11:18 EDT

## 2024-10-07 DIAGNOSIS — J30.1 SEASONAL ALLERGIC RHINITIS DUE TO POLLEN: Chronic | ICD-10-CM

## 2024-10-07 RX ORDER — CETIRIZINE HYDROCHLORIDE 5 MG/1
5 TABLET ORAL DAILY
Qty: 90 TABLET | Refills: 1 | OUTPATIENT
Start: 2024-10-07

## 2024-10-08 NOTE — PROGRESS NOTES
Chief Complaint  Follow-up (The patient is coming in for a 6 month follow up. )    Subjective    Krupa Concepcion is a 39 y.o. female who presents to [facility] INTER[facility] MEDICINE at [address] [place] [address] for evaluation via video conferencing visit.  The patient's current location is home at address listed in [place]. You have chosen to receive care through a telehealth visit.  Do you consent to use a video/audio connection for your medical care today? Yes.       History of Present Illness         Past Medical History:   Diagnosis Date    Abnormal Pap smear of cervix     Anesthesia     SLOW TO WAKE UP AND IS EMOTIONAL    Anxiety     Arrhythmia     Asthma     AS A CHILD    Chest pain 08/14/2019    HX OF CHEST PAIN. SAW DR GARCIA IN 2020 PT REPORTS HAD CARDIAC TESTING AND WAS RELEASED. FOLLOWED BY PCP. DENIES CP/SOA AND IS ACTIVE    Depression     Disease of thyroid gland     GERD (gastroesophageal reflux disease)     HPV (human papilloma virus) infection     Migraine without aura     Schizoaffective disorder     REPORTS IS BEING EVALUATED    Trauma         Past Surgical History:   Procedure Laterality Date    BREAST BIOPSY Left 2023    (-)    CARDIAC CATHETERIZATION N/A 07/08/2020    Procedure: Left Heart Cath with Coronary Angiography;  Surgeon: Lilibeth Garcia MD;  Location: [facility] CATH INVASIVE LOCATION;  Service: Cardiovascular;  Laterality: N/A;    CERVICAL CONIZATION, LEEP      COLONOSCOPY      D & C HYSTEROSCOPY N/A 8/15/2023    Procedure: DILATATION AND CURETTAGE HYSTEROSCOPY, IUD REMOVAL;  Surgeon: Aye Vasquez DO;  Location: [facility] OR;  Service: Gynecology;  Laterality: N/A;    DENTAL PROCEDURE      EAR TUBES Bilateral     x2 times    ENDOSCOPY      SALPINGECTOMY Bilateral 8/15/2023    Procedure: SALPINGECTOMY LAPAROSCOPIC;  Surgeon: Aye Vasquez DO;  Location: [facility] OR;  Service: Gynecology;  Laterality: Bilateral;    TONSILLECTOMY          Social  History     Tobacco Use   Smoking Status Never    Passive exposure: Never   Smokeless Tobacco Never        Patient Care Team:  Imelda Greenwood APRN as PCP - General (Nurse Practitioner)  Lilibeth Langley MD as Consulting Physician (Cardiology)  Anne Gould MD as Consulting Physician (Obstetrics and Gynecology)  Aretha Silver APRN as Nurse Practitioner (Obstetrics and Gynecology)    Allergies   Allergen Reactions    Tea Tree Oil Anaphylaxis    Zolpidem Tartrate Mental Status Change    Trazodone Other (See Comments)     Leg cramping     Benzonatate Other (See Comments)     Does not work;pt states she is not allergic.     Cefdinir Hallucinations     hallucinations    Cromolyn Rash          Current Outpatient Medications:     cetirizine (zyrTEC) 5 MG tablet, Take 1 tablet by mouth Daily., Disp: 90 tablet, Rfl: 1    Cholecalciferol 25 MCG (1000 UT) tablet, Take 2 tablets by mouth Daily., Disp: , Rfl:     cloNIDine (CATAPRES) 0.1 MG tablet, Take 1 tablet by mouth every night at bedtime., Disp: 30 tablet, Rfl: 0    docusate sodium (COLACE) 100 MG capsule, Take 1 capsule by mouth 3 (Three) Times a Day As Needed for Constipation., Disp: , Rfl:     famotidine (PEPCID) 20 MG tablet, TAKE 1 TABLET BY MOUTH EVERY 12 HOURS, Disp: 180 tablet, Rfl: 1    fluticasone (FLONASE) 50 MCG/ACT nasal spray, SHAKE LIQUID AND USE 2 SPRAYS IN EACH NOSTRIL EVERY EVENING AS NEEDED, Disp: 48 g, Rfl: 3    levothyroxine (SYNTHROID, LEVOTHROID) 50 MCG tablet, TAKE 1 TABLET BY MOUTH EVERY MORNING, Disp: 90 tablet, Rfl: 1    MELATONIN GUMMIES PO, Take 10 mg by mouth As Needed (as needed for sleep)., Disp: , Rfl:     multivitamin with minerals tablet tablet, Take 1 tablet by mouth Daily., Disp: , Rfl:     ondansetron ODT (ZOFRAN-ODT) 4 MG disintegrating tablet, Take 1 tablet by mouth Every 8 (Eight) Hours As Needed., Disp: , Rfl:     polyethylene glycol (MIRALAX) 17 g packet, Take 17 g by mouth Daily As Needed (as needed for  constipation)., Disp: 100 each, Rfl: 3    QUEtiapine (SEROquel) 200 MG tablet, Take 3 tablets by mouth Every Night., Disp: , Rfl:     Sodium Fluoride 5000 PPM 1.1 % gel, Apply 1.1 Applications topically Every Night., Disp: , Rfl:     spironolactone (ALDACTONE) 25 MG tablet, TAKE 1/2 TABLET BY MOUTH EVERY MORNING AS NEEDED FOR FLUID RETENTION, Disp: 30 tablet, Rfl: 1    spironolactone (ALDACTONE) 25 MG tablet, TAKE 1/2 TABLET BY MOUTH EVERY MORNING AS NEEDED FOR FLUID RETENTION, Disp: 30 tablet, Rfl: 1    topiramate (TOPAMAX) 100 MG tablet, Take 1 tablet by mouth 2 (Two) Times a Day., Disp: 180 tablet, Rfl: 1    Wheat Dextrin (Benefiber) chewable tablet, Chew., Disp: , Rfl:     Objective     BP Readings from Last 3 Encounters:   10/15/24 115/70   04/24/24 100/70   12/20/23 100/70      Wt Readings from Last 3 Encounters:   10/15/24 60.5 kg (133 lb 6.4 oz)   04/24/24 59.8 kg (131 lb 12.8 oz)   12/20/23 62.1 kg (137 lb)       Physical Exam  Constitutional:       General: The patient is not in acute distress.  Pulmonary:      Effort: Pulmonary effort is normal.   Neurological:      General: No focal deficit present.      Mental Status: The patient is alert.   Psychiatric:         Thought Content: Thought content normal.        Result Review   The following data was reviewed by: CHAPARRO Spring on 10/15/2024:  []  Tests & Results  []  Hospitalization/Emergency Department/Urgent Care  []  Internal/External Consultant Notes       Assessment and Plan     There are no diagnoses linked to this encounter.     Assessment & Plan          Patient was given instructions and counseling regarding her condition or for health maintenance advice. Please see specific information pulled into the AVS if appropriate.     Follow Up   No follow-ups on file.    Patient or patient representative verbalized consent for the use of Ambient Listening during the visit with  CHAPARRO Spring for chart documentation. 10/15/2024  16:29  EDT    The visit included audio and video interaction. No technical issues occurred during this visit. The provider spent 12:00 minutes with the patient during the video conferencing visit via clipsync.The following staff were present during the visit: None.    CHAPARRO Spring

## 2024-10-11 DIAGNOSIS — K21.9 GASTROESOPHAGEAL REFLUX DISEASE WITHOUT ESOPHAGITIS: Primary | ICD-10-CM

## 2024-10-11 RX ORDER — FAMOTIDINE 20 MG/1
20 TABLET, FILM COATED ORAL EVERY 12 HOURS
Qty: 180 TABLET | Refills: 1 | Status: SHIPPED | OUTPATIENT
Start: 2024-10-11

## 2024-10-14 ENCOUNTER — TRANSCRIBE ORDERS (OUTPATIENT)
Dept: ADMINISTRATIVE | Facility: HOSPITAL | Age: 39
End: 2024-10-14
Payer: MEDICAID

## 2024-10-14 ENCOUNTER — TELEPHONE (OUTPATIENT)
Dept: OBSTETRICS AND GYNECOLOGY | Facility: CLINIC | Age: 39
End: 2024-10-14
Payer: MEDICAID

## 2024-10-14 ENCOUNTER — TELEPHONE (OUTPATIENT)
Dept: INTERNAL MEDICINE | Age: 39
End: 2024-10-14

## 2024-10-14 DIAGNOSIS — Z12.31 VISIT FOR SCREENING MAMMOGRAM: Primary | ICD-10-CM

## 2024-10-14 NOTE — TELEPHONE ENCOUNTER
Caller: Krupa Concepcion    Relationship: Self    Best call back number: [phone]    What was the call regarding: PATIENT IS REQUESTING A CALL BACK REGARDING A VACCINE FOR WHOOPING COUGH.

## 2024-10-14 NOTE — TELEPHONE ENCOUNTER
Caller: JONO SALAS    Relationship to patient: SELF    Best call back number: [phone]        Type of visit: ANNUAL AND MAMMOGRAM    Requested date: 10-15-24     If rescheduling, when is the original appointment: 10- 22-24    Additional notes:PT IS REQUESTING APPT TO BE MOVED TO 10-15-24 IS ABLE  DUE TO SCHEDULE CONFLICT , PT WOULD LIKE A CALL BACK

## 2024-10-15 ENCOUNTER — OFFICE VISIT (OUTPATIENT)
Dept: INTERNAL MEDICINE | Age: 39
End: 2024-10-15
Payer: MEDICAID

## 2024-10-15 VITALS
WEIGHT: 133.4 LBS | TEMPERATURE: 97.9 F | OXYGEN SATURATION: 100 % | SYSTOLIC BLOOD PRESSURE: 115 MMHG | DIASTOLIC BLOOD PRESSURE: 70 MMHG | HEART RATE: 90 BPM | BODY MASS INDEX: 26.89 KG/M2 | HEIGHT: 59 IN

## 2024-10-15 DIAGNOSIS — R60.9 BODY FLUID RETENTION: ICD-10-CM

## 2024-10-15 DIAGNOSIS — E03.9 HYPOTHYROIDISM, UNSPECIFIED TYPE: Primary | Chronic | ICD-10-CM

## 2024-10-15 DIAGNOSIS — E55.9 VITAMIN D DEFICIENCY: ICD-10-CM

## 2024-10-15 DIAGNOSIS — J30.1 SEASONAL ALLERGIC RHINITIS DUE TO POLLEN: Chronic | ICD-10-CM

## 2024-10-15 PROCEDURE — 1126F AMNT PAIN NOTED NONE PRSNT: CPT | Performed by: NURSE PRACTITIONER

## 2024-10-15 PROCEDURE — 1159F MED LIST DOCD IN RCRD: CPT | Performed by: NURSE PRACTITIONER

## 2024-10-15 PROCEDURE — 1160F RVW MEDS BY RX/DR IN RCRD: CPT | Performed by: NURSE PRACTITIONER

## 2024-10-15 PROCEDURE — 99214 OFFICE O/P EST MOD 30 MIN: CPT | Performed by: NURSE PRACTITIONER

## 2024-10-15 RX ORDER — CETIRIZINE HYDROCHLORIDE 5 MG/1
5 TABLET ORAL DAILY
Qty: 90 TABLET | Refills: 1 | Status: SHIPPED | OUTPATIENT
Start: 2024-10-15 | End: 2024-10-15 | Stop reason: SDUPTHER

## 2024-10-15 RX ORDER — SPIRONOLACTONE 25 MG/1
TABLET ORAL
Qty: 30 TABLET | Refills: 1 | Status: SHIPPED | OUTPATIENT
Start: 2024-10-15 | End: 2024-10-15 | Stop reason: SDUPTHER

## 2024-10-15 RX ORDER — LEVOTHYROXINE SODIUM 50 UG/1
50 TABLET ORAL
Qty: 90 TABLET | Refills: 1 | Status: SHIPPED | OUTPATIENT
Start: 2024-10-15 | End: 2024-10-15 | Stop reason: SDUPTHER

## 2024-10-15 RX ORDER — FLUTICASONE PROPIONATE 50 UG/1
SPRAY, METERED NASAL
Qty: 48 G | Refills: 3 | Status: SHIPPED | OUTPATIENT
Start: 2024-10-15

## 2024-10-15 RX ORDER — SPIRONOLACTONE 25 MG/1
TABLET ORAL
Qty: 30 TABLET | Refills: 1 | Status: SHIPPED | OUTPATIENT
Start: 2024-10-15

## 2024-10-15 RX ORDER — LEVOTHYROXINE SODIUM 50 UG/1
50 TABLET ORAL
Qty: 90 TABLET | Refills: 1 | Status: SHIPPED | OUTPATIENT
Start: 2024-10-15

## 2024-10-15 RX ORDER — CETIRIZINE HYDROCHLORIDE 5 MG/1
5 TABLET ORAL DAILY
Qty: 90 TABLET | Refills: 1 | Status: SHIPPED | OUTPATIENT
Start: 2024-10-15

## 2024-10-15 NOTE — PROGRESS NOTES
Chief Complaint  Follow-up (The patient is coming in for a 6 month follow up. )    Subjective      History of Present Illness  The patient is a 39-year-old female who presents for a follow-up visit.    She reports no current migraines, attributing past episodes to an imbalance caused by the initiation of Seroquel.     She has been diagnosed with bipolar disorder and is currently stable on her medication regimen. She receives clonidine from behavioral health and topiramate for mood regulation. She was informed that discontinuing topiramate could lead to seizures due to long-term use. She does not have epilepsy. Her psychiatrist is Juani at [facility].    For thyroid management, she takes levothyroxine 50 mcg daily and reports feeling well on this dose.    For allergies, she takes cetirizine 10 mg daily and uses Flonase nasal spray, both of which are effective.    She also takes over-the-counter vitamin D in the morning.    For fluid retention, she takes spironolactone as needed, particularly during long car rides, and adjusts the dosage based on her activity level.       Past Medical History:   Diagnosis Date    Abnormal Pap smear of cervix     Anesthesia     SLOW TO WAKE UP AND IS EMOTIONAL    Anxiety     Arrhythmia     Asthma     AS A CHILD    Chest pain 08/14/2019    HX OF CHEST PAIN. SAW DR GARCIA IN 2020 PT REPORTS HAD CARDIAC TESTING AND WAS RELEASED. FOLLOWED BY PCP. DENIES CP/SOA AND IS ACTIVE    Depression     Disease of thyroid gland     GERD (gastroesophageal reflux disease)     HPV (human papilloma virus) infection     Migraine without aura     Schizoaffective disorder     REPORTS IS BEING EVALUATED    Trauma         Past Surgical History:   Procedure Laterality Date    BREAST BIOPSY Left 2023    (-)    CARDIAC CATHETERIZATION N/A 07/08/2020    Procedure: Left Heart Cath with Coronary Angiography;  Surgeon: Lilibeth Garcia MD;  Location: [facility] CATH INVASIVE LOCATION;  Service:  Cardiovascular;  Laterality: N/A;    CERVICAL CONIZATION, LEEP      COLONOSCOPY      D & C HYSTEROSCOPY N/A 8/15/2023    Procedure: DILATATION AND CURETTAGE HYSTEROSCOPY, IUD REMOVAL;  Surgeon: Aye Vasquez DO;  Location: [facility] MAIN OR;  Service: Gynecology;  Laterality: N/A;    DENTAL PROCEDURE      EAR TUBES Bilateral     x2 times    ENDOSCOPY      SALPINGECTOMY Bilateral 8/15/2023    Procedure: SALPINGECTOMY LAPAROSCOPIC;  Surgeon: Aye Vasquez DO;  Location: [facility] MAIN OR;  Service: Gynecology;  Laterality: Bilateral;    TONSILLECTOMY          Social History     Tobacco Use   Smoking Status Never    Passive exposure: Never   Smokeless Tobacco Never        Patient Care Team:  Imelda Greenwood APRN as PCP - General (Nurse Practitioner)  Lilibeth Langley MD as Consulting Physician (Cardiology)  Anne Gould MD as Consulting Physician (Obstetrics and Gynecology)  Aretha Silver APRN as Nurse Practitioner (Obstetrics and Gynecology)    Allergies   Allergen Reactions    Tea Tree Oil Anaphylaxis    Zolpidem Tartrate Mental Status Change    Trazodone Other (See Comments)     Leg cramping     Benzonatate Other (See Comments)     Does not work;pt states she is not allergic.     Cefdinir Hallucinations     hallucinations    Cromolyn Rash          Current Outpatient Medications:     cetirizine (zyrTEC) 5 MG tablet, Take 1 tablet by mouth Daily., Disp: 90 tablet, Rfl: 1    Cholecalciferol 25 MCG (1000 UT) tablet, Take 2 tablets by mouth Daily., Disp: , Rfl:     cloNIDine (CATAPRES) 0.1 MG tablet, Take 1 tablet by mouth every night at bedtime., Disp: 30 tablet, Rfl: 0    docusate sodium (COLACE) 100 MG capsule, Take 1 capsule by mouth 3 (Three) Times a Day As Needed for Constipation., Disp: , Rfl:     famotidine (PEPCID) 20 MG tablet, TAKE 1 TABLET BY MOUTH EVERY 12 HOURS, Disp: 180 tablet, Rfl: 1    fluticasone (FLONASE) 50 MCG/ACT nasal spray, SHAKE LIQUID AND USE 2 SPRAYS IN EACH NOSTRIL EVERY  "EVENING AS NEEDED, Disp: 48 g, Rfl: 3    levothyroxine (SYNTHROID, LEVOTHROID) 50 MCG tablet, Take 1 tablet by mouth Every Morning., Disp: 90 tablet, Rfl: 1    MELATONIN GUMMIES PO, Take 10 mg by mouth As Needed (as needed for sleep)., Disp: , Rfl:     multivitamin with minerals tablet tablet, Take 1 tablet by mouth Daily., Disp: , Rfl:     ondansetron ODT (ZOFRAN-ODT) 4 MG disintegrating tablet, Take 1 tablet by mouth Every 8 (Eight) Hours As Needed., Disp: , Rfl:     polyethylene glycol (MIRALAX) 17 g packet, Take 17 g by mouth Daily As Needed (as needed for constipation)., Disp: 100 each, Rfl: 3    QUEtiapine (SEROquel) 200 MG tablet, Take 3 tablets by mouth Every Night., Disp: , Rfl:     Sodium Fluoride 5000 PPM 1.1 % gel, Apply 1.1 Applications topically Every Night., Disp: , Rfl:     spironolactone (ALDACTONE) 25 MG tablet, TAKE 1/2 TABLET BY MOUTH EVERY MORNING AS NEEDED FOR FLUID RETENTION, Disp: 30 tablet, Rfl: 1    topiramate (TOPAMAX) 100 MG tablet, Take 1 tablet by mouth 2 (Two) Times a Day., Disp: 180 tablet, Rfl: 1    Wheat Dextrin (Benefiber) chewable tablet, Chew., Disp: , Rfl:     Objective     Vitals:    10/15/24 1603   BP: 115/70   BP Location: Left arm   Patient Position: Sitting   Cuff Size: Adult   Pulse: 90   Temp: 97.9 °F (36.6 °C)   TempSrc: Temporal   SpO2: 100%   Weight: 60.5 kg (133 lb 6.4 oz)   Height: 149.9 cm (59\")        Wt Readings from Last 3 Encounters:   10/15/24 60.5 kg (133 lb 6.4 oz)   04/24/24 59.8 kg (131 lb 12.8 oz)   12/20/23 62.1 kg (137 lb)        BP Readings from Last 3 Encounters:   10/15/24 115/70   04/24/24 100/70   12/20/23 100/70        Physical Exam  Vitals reviewed.   Constitutional:       General: She is not in acute distress.  HENT:      Head: Normocephalic and atraumatic.   Pulmonary:      Effort: Pulmonary effort is normal.   Neurological:      General: No focal deficit present.      Mental Status: She is alert.   Psychiatric:         Thought Content: Thought " content normal.             Result Review   The following data was reviewed by: CHAPARRO Spring on 10/15/2024:  [x]  Tests & Results  []  Hospitalization/Emergency Department/Urgent Care  []  Internal/External Consultant Notes       Assessment and Plan     Diagnoses and all orders for this visit:    1. Hypothyroidism, unspecified type (Primary)    2. Seasonal allergic rhinitis due to pollen  -     Discontinue: cetirizine (zyrTEC) 5 MG tablet; Take 1 tablet by mouth Daily.  Dispense: 90 tablet; Refill: 1  -     cetirizine (zyrTEC) 5 MG tablet; Take 1 tablet by mouth Daily.  Dispense: 90 tablet; Refill: 1    3. Vitamin D deficiency    4. Body fluid retention    Other orders  -     Discontinue: levothyroxine (SYNTHROID, LEVOTHROID) 50 MCG tablet; Take 1 tablet by mouth Every Morning.  Dispense: 90 tablet; Refill: 1  -     Discontinue: spironolactone (ALDACTONE) 25 MG tablet; TAKE 1/2 TABLET BY MOUTH EVERY MORNING AS NEEDED FOR FLUID RETENTION  Dispense: 30 tablet; Refill: 1  -     levothyroxine (SYNTHROID, LEVOTHROID) 50 MCG tablet; Take 1 tablet by mouth Every Morning.  Dispense: 90 tablet; Refill: 1  -     spironolactone (ALDACTONE) 25 MG tablet; TAKE 1/2 TABLET BY MOUTH EVERY MORNING AS NEEDED FOR FLUID RETENTION  Dispense: 30 tablet; Refill: 1  -     fluticasone (FLONASE) 50 MCG/ACT nasal spray; SHAKE LIQUID AND USE 2 SPRAYS IN EACH NOSTRIL EVERY EVENING AS NEEDED  Dispense: 48 g; Refill: 3       Assessment & Plan  1. Hypothyroidism.  She is currently on levothyroxine 50 mcg daily and reports feeling well. Her last thyroid levels were within normal range. Continuation of the current dosage is advised. Thyroid levels will be rechecked in approximately 6 months during her next physical examination.    2. Allergic Rhinitis.  She is taking cetirizine 10 mg daily and using Flonase nasal spray, both of which are effectively managing her symptoms. Continuation of these medications is recommended.    3. Vitamin D  Deficiency.  She is taking over-the-counter vitamin D supplements daily. Continuation of this regimen is advised. Vitamin D levels will be rechecked during her next visit.    4. Fluid Retention.  She is on spironolactone 25 mg as needed for fluid retention, particularly during long car rides. She often takes a reduced dose (half or a quarter of the pill) based on her daily activities. Continuation of this medication as needed is advised.    5. Bipolar Disorder.  She is stable on her current medication regimen, which includes Seroquel and topiramate. She reports being compliant with her medications and does not intend to discontinue them. She is advised to continue her current medications and consult her psychiatrist for any adjustments.    6. Medication Management.  She is currently taking clonidine, which is managed by her behavioral health provider. She is also on topiramate for mood stabilization, which should be continued as prescribed by her psychiatrist. If there are any issues with obtaining these medications, she is advised to contact her pharmacy or request a prescription refill.    Patient was given instructions and counseling regarding her condition or for health maintenance advice. Please see specific information pulled into the AVS if appropriate.     Follow Up   Return in about 6 months (around 4/25/2025) for Annual physical.    Patient or patient representative verbalized consent for the use of Ambient Listening during the visit with  CHAPARRO Spring for chart documentation. 10/15/2024  16:49 EDT    CHAPARRO Spring

## 2024-10-21 ENCOUNTER — TELEPHONE (OUTPATIENT)
Dept: INTERNAL MEDICINE | Age: 39
End: 2024-10-21
Payer: MEDICAID

## 2024-10-21 NOTE — TELEPHONE ENCOUNTER
Caller: Krupa Concepcion    Relationship: Self    Best call back number:     [phone] (       What was the call regarding: PATIENT STATES THAT SHE IS PLANNING ON GETTING HER LABS DONE AT THE END OF NEXT MONTH AND NEEDS TO KNOW IF SHE SHOULD FAST OR NOT. VOICEMAIL OK

## 2025-01-04 DIAGNOSIS — K21.9 GASTROESOPHAGEAL REFLUX DISEASE WITHOUT ESOPHAGITIS: ICD-10-CM

## 2025-01-07 RX ORDER — FAMOTIDINE 20 MG/1
20 TABLET, FILM COATED ORAL EVERY 12 HOURS
Qty: 180 TABLET | Refills: 1 | Status: SHIPPED | OUTPATIENT
Start: 2025-01-07

## 2025-05-09 NOTE — DISCHARGE INSTRUCTIONS
No drinking driving or operating heavy machinery until seen by family doctor or cardiologist, call tomorrow for an appointment   [place] Approved  Auth Number: [id number]   5/9/25 through 11/5/25  Filling Pharmacy: [facility]

## 2025-05-15 NOTE — TELEPHONE ENCOUNTER
Caller: Krupa Concepcion    Relationship: Self    Best call back number: [phone]     Requested Prescriptions:   Requested Prescriptions     Pending Prescriptions Disp Refills    spironolactone (ALDACTONE) 25 MG tablet 30 tablet 1     Sig: TAKE 1/2 TABLET BY MOUTH EVERY MORNING AS NEEDED FOR FLUID RETENTION        Pharmacy where request should be sent: [id number] - [place] [address] AT [address] - [phone] [facility] [phone] FX     Last office visit with prescribing clinician: 10/15/2024   Last telemedicine visit with prescribing clinician: Visit date not found   Next office visit with prescribing clinician: 6/6/2025     Additional details provided by patient:         Abby Ayala, PCT   05/15/25 13:48 EDT

## 2025-05-16 RX ORDER — SPIRONOLACTONE 25 MG/1
TABLET ORAL
Qty: 30 TABLET | Refills: 1 | Status: SHIPPED | OUTPATIENT
Start: 2025-05-16

## 2025-06-04 NOTE — PROGRESS NOTES
Chief Complaint  Annual Exam (40 year old female here today for her annual physical. Pt had labs in April )    Subjective      History of Present Illness  The patient is a 40-year-old female who presents for an annual wellness exam and follow-up.    She is currently transitioning between personal counseling providers and is undergoing a social security medical review. She is in the process of obtaining her medical records from her previous provider, [facility], where she was diagnosed with depression, which was reported to be clinically stable. She has been on a long-term regimen of quetiapine and topiramate, spanning approximately 10 to 20 years. Initially, she was prescribed topiramate for mood stabilization, which resulted in significant weight loss, prompting its discontinuation. Subsequently, she was started on quetiapine, leading to a weight gain and the development of prediabetes. The combination of topiramate and quetiapine was then used to manage her condition, considering her existing thyroid issue. She reports that this combination has been effective. She reports no history of suicidal ideation. Her current medication regimen includes quetiapine 200 mg, 3 tablets in the evening, and topiramate 100 mg twice daily.    She is uncertain about the initiation of her blood pressure medication but recalls being prescribed clonidine for hypertension. She has a history of elevated blood pressure.    She is on thyroid medication and her levels have been good.    She is on allergy medication.       Past Medical History:   Diagnosis Date    Abnormal Pap smear of cervix     Anesthesia     SLOW TO WAKE UP AND IS EMOTIONAL    Anxiety     Arrhythmia     Asthma     AS A CHILD    Chest pain 08/14/2019    HX OF CHEST PAIN. SAW DR GARCIA IN 2020 PT REPORTS HAD CARDIAC TESTING AND WAS RELEASED. FOLLOWED BY PCP. DENIES CP/SOA AND IS ACTIVE    Depression     Disease of thyroid gland     GERD (gastroesophageal reflux  disease)     HPV (human papilloma virus) infection     Migraine without aura     Schizoaffective disorder     REPORTS IS BEING EVALUATED    Trauma         Past Surgical History:   Procedure Laterality Date    BREAST BIOPSY Left 2023    (-)    CARDIAC CATHETERIZATION N/A 07/08/2020    Procedure: Left Heart Cath with Coronary Angiography;  Surgeon: Lilibeth Langley MD;  Location: [facility] CATH INVASIVE LOCATION;  Service: Cardiovascular;  Laterality: N/A;    CERVICAL CONIZATION, LEEP      COLONOSCOPY      D & C HYSTEROSCOPY N/A 8/15/2023    Procedure: DILATATION AND CURETTAGE HYSTEROSCOPY, IUD REMOVAL;  Surgeon: Aye Vasquez DO;  Location: [facility] MAIN OR;  Service: Gynecology;  Laterality: N/A;    DENTAL PROCEDURE      EAR TUBES Bilateral     x2 times    ENDOSCOPY      SALPINGECTOMY Bilateral 8/15/2023    Procedure: SALPINGECTOMY LAPAROSCOPIC;  Surgeon: Aye Vasquez DO;  Location: [facility] MAIN OR;  Service: Gynecology;  Laterality: Bilateral;    TONSILLECTOMY          Social History     Tobacco Use   Smoking Status Never    Passive exposure: Never   Smokeless Tobacco Never        Patient Care Team:  Imelda Greenwood APRN as PCP - General (Nurse Practitioner)  Lilibeth Langley MD as Consulting Physician (Cardiology)  Anne Gould MD as Consulting Physician (Obstetrics and Gynecology)  Aretha Silver APRN as Nurse Practitioner (Obstetrics and Gynecology)    Allergies   Allergen Reactions    Zolpidem Tartrate Mental Status Change    Trazodone Myalgia    Benzonatate Other (See Comments)     Does not work;pt states she is not allergic.     Cefdinir Hallucinations     hallucinations          Current Outpatient Medications:     cetirizine (zyrTEC) 5 MG tablet, Take 1 tablet by mouth Daily., Disp: 90 tablet, Rfl: 3    Cholecalciferol 25 MCG (1000 UT) tablet, Take 2 tablets by mouth Daily., Disp: , Rfl:     cloNIDine (CATAPRES) 0.1 MG tablet, Take 1 tablet by mouth every night at bedtime., Disp: 90 tablet,  "Rfl: 0    docusate sodium (COLACE) 100 MG capsule, Take 1 capsule by mouth 3 (Three) Times a Day As Needed for Constipation., Disp: 90 capsule, Rfl: 3    famotidine (PEPCID) 20 MG tablet, Take 1 tablet by mouth Every 12 (Twelve) Hours., Disp: 180 tablet, Rfl: 3    fluticasone (FLONASE) 50 MCG/ACT nasal spray, SHAKE LIQUID AND USE 2 SPRAYS IN EACH NOSTRIL EVERY EVENING AS NEEDED, Disp: 48 g, Rfl: 3    levothyroxine (SYNTHROID, LEVOTHROID) 50 MCG tablet, Take 1 tablet by mouth Every Morning., Disp: 90 tablet, Rfl: 3    MELATONIN GUMMIES PO, Take 10 mg by mouth As Needed (as needed for sleep)., Disp: , Rfl:     multivitamin with minerals tablet tablet, Take 1 tablet by mouth Daily., Disp: , Rfl:     ondansetron ODT (ZOFRAN-ODT) 4 MG disintegrating tablet, Take 1 tablet by mouth Every 8 (Eight) Hours As Needed., Disp: , Rfl:     polyethylene glycol (MIRALAX) 17 g packet, Take 17 g by mouth Daily As Needed (as needed for constipation)., Disp: 100 each, Rfl: 3    QUEtiapine (SEROquel) 200 MG tablet, Take 3 tablets by mouth Every Night., Disp: 90 tablet, Rfl: 0    Sodium Fluoride 5000 PPM 1.1 % gel, Apply 1.1 Applications topically Every Night., Disp: , Rfl:     spironolactone (ALDACTONE) 25 MG tablet, TAKE 1/2 TABLET BY MOUTH EVERY MORNING AS NEEDED FOR FLUID RETENTION, Disp: 30 tablet, Rfl: 1    topiramate (TOPAMAX) 100 MG tablet, Take 1 tablet by mouth 2 (Two) Times a Day., Disp: 180 tablet, Rfl: 0    Wheat Dextrin (Benefiber) chewable tablet, Chew., Disp: , Rfl:     Objective     Vitals:    06/06/25 1517   BP: 114/66   BP Location: Left arm   Patient Position: Sitting   Cuff Size: Large Adult   Pulse: 95   Resp: 18   Temp: 98 °F (36.7 °C)   TempSrc: Temporal   SpO2: 98%   Weight: 64.9 kg (143 lb)   Height: 149.9 cm (59.02\")        Wt Readings from Last 3 Encounters:   06/06/25 64.9 kg (143 lb)   10/15/24 60.5 kg (133 lb 6.4 oz)   04/24/24 59.8 kg (131 lb 12.8 oz)        BP Readings from Last 3 Encounters:   06/06/25 " 114/66   10/15/24 115/70   04/24/24 100/70          Physical Exam  Vitals reviewed.   Constitutional:       General: She is not in acute distress.  Cardiovascular:      Rate and Rhythm: Normal rate and regular rhythm.      Heart sounds: Normal heart sounds.   Pulmonary:      Effort: Pulmonary effort is normal.      Breath sounds: Normal breath sounds. No wheezing, rhonchi or rales.   Abdominal:      General: There is no distension.      Palpations: Abdomen is soft. There is no mass.      Tenderness: There is no abdominal tenderness.   Musculoskeletal:      Right lower leg: No edema.      Left lower leg: No edema.   Lymphadenopathy:      Cervical: No cervical adenopathy.   Skin:     General: Skin is warm and dry.   Neurological:      General: No focal deficit present.      Mental Status: She is alert.   Psychiatric:         Mood and Affect: Mood normal.         Thought Content: Thought content normal.                Result Review   The following data was reviewed by: CHAPARRO Spring on 06/06/2025:  [x]  Tests & Results  []  Hospitalization/Emergency Department/Urgent Care  []  Internal/External Consultant Notes       Assessment and Plan     Diagnoses and all orders for this visit:    1. Annual physical exam (Primary)    2. Depression, unspecified depression type    3. Mood disorder    4. Hypothyroidism, unspecified type    5. Seasonal allergic rhinitis due to pollen  -     cetirizine (zyrTEC) 5 MG tablet; Take 1 tablet by mouth Daily.  Dispense: 90 tablet; Refill: 3    6. Gastroesophageal reflux disease without esophagitis  -     famotidine (PEPCID) 20 MG tablet; Take 1 tablet by mouth Every 12 (Twelve) Hours.  Dispense: 180 tablet; Refill: 3    7. Vitamin D deficiency    Other orders  -     levothyroxine (SYNTHROID, LEVOTHROID) 50 MCG tablet; Take 1 tablet by mouth Every Morning.  Dispense: 90 tablet; Refill: 3  -     topiramate (TOPAMAX) 100 MG tablet; Take 1 tablet by mouth 2 (Two) Times a Day.  Dispense:  180 tablet; Refill: 0  -     QUEtiapine (SEROquel) 200 MG tablet; Take 3 tablets by mouth Every Night.  Dispense: 90 tablet; Refill: 0  -     cloNIDine (CATAPRES) 0.1 MG tablet; Take 1 tablet by mouth every night at bedtime.  Dispense: 90 tablet; Refill: 0  -     docusate sodium (COLACE) 100 MG capsule; Take 1 capsule by mouth 3 (Three) Times a Day As Needed for Constipation.  Dispense: 90 capsule; Refill: 3  -     fluticasone (FLONASE) 50 MCG/ACT nasal spray; SHAKE LIQUID AND USE 2 SPRAYS IN EACH NOSTRIL EVERY EVENING AS NEEDED  Dispense: 48 g; Refill: 3         Assessment & Plan  1.Annual wellness exam.  - Discussed healthy diet, exercise, adequate sleep, cancer screening, immunizations and preventative care.      2. Depression.  - Currently taking quetiapine 200 mg, 3 tablets at night, and topiramate 100 mg in the morning and 100 mg at night.  - No changes in medication regimen; effectiveness of current medications noted.  - Discussed the importance of continuing medications during transition between providers.  - Prescriptions for quetiapine and topiramate will be sent to pharmacy.    3. Mood disorder.  - Currently taking quetiapine 200 mg, 3 tablets at night, and topiramate 100 mg in the morning and 100 mg at night.  - No changes in medication regimen; effectiveness of current medications noted.  - Discussed the importance of continuing medications during transition between providers.  - Prescriptions for quetiapine and topiramate will be sent to pharmacy.    4. Thyroid disorder.  - Thyroid levels have been stable.  - No changes in medication regimen; effectiveness of current medication noted.  - Discussed the importance of continuing thyroid medication.  - Prescription for thyroid medication will be sent to pharmacy.    5. Allergies.  - Currently taking cetirizine and fluticasone nasal spray.  - No changes in medication regimen; effectiveness of current medications noted.  - Discussed the importance of  continuing allergy medications.  - Prescriptions for cetirizine and fluticasone nasal spray will be sent to pharmacy.    6. Gastroesophageal Reflux Disease (GERD).  - Currently taking famotidine.  - No changes in medication regimen; effectiveness of current medication noted.  - Discussed the importance of continuing GERD medication.  - Prescription for famotidine will be sent to pharmacy.    7. Medication Management.  - Currently taking clonidine for sleep and mood disorders.  - No changes in medication regimen; effectiveness of current medication noted.  - Discussed the importance of continuing clonidine and spironolactone as needed for swelling.  - Prescriptions for clonidine, spironolactone, and Colace will be sent to pharmacy.       BMI is >= 25 and <30. (Overweight) The following options were offered after discussion;: exercise counseling/recommendations and nutrition counseling/recommendations       Patient was given instructions and counseling regarding her condition or for health maintenance advice. Please see specific information pulled into the AVS if appropriate.     Follow Up   Return in about 1 year (around 6/6/2026) for Annual physical, Recheck.    Patient or patient representative verbalized consent for the use of Ambient Listening during the visit with  CHAPARRO Spring for chart documentation. 6/8/2025  15:46 EDT    CHAPARRO Spring

## 2025-06-06 ENCOUNTER — OFFICE VISIT (OUTPATIENT)
Dept: INTERNAL MEDICINE | Age: 40
End: 2025-06-06
Payer: COMMERCIAL

## 2025-06-06 VITALS
RESPIRATION RATE: 18 BRPM | BODY MASS INDEX: 28.83 KG/M2 | DIASTOLIC BLOOD PRESSURE: 66 MMHG | SYSTOLIC BLOOD PRESSURE: 114 MMHG | TEMPERATURE: 98 F | HEIGHT: 59 IN | OXYGEN SATURATION: 98 % | HEART RATE: 95 BPM | WEIGHT: 143 LBS

## 2025-06-06 DIAGNOSIS — J30.1 SEASONAL ALLERGIC RHINITIS DUE TO POLLEN: Chronic | ICD-10-CM

## 2025-06-06 DIAGNOSIS — Z00.00 ANNUAL PHYSICAL EXAM: Primary | ICD-10-CM

## 2025-06-06 DIAGNOSIS — F39 MOOD DISORDER: ICD-10-CM

## 2025-06-06 DIAGNOSIS — K21.9 GASTROESOPHAGEAL REFLUX DISEASE WITHOUT ESOPHAGITIS: ICD-10-CM

## 2025-06-06 DIAGNOSIS — E55.9 VITAMIN D DEFICIENCY: ICD-10-CM

## 2025-06-06 DIAGNOSIS — E03.9 HYPOTHYROIDISM, UNSPECIFIED TYPE: ICD-10-CM

## 2025-06-06 DIAGNOSIS — F32.A DEPRESSION, UNSPECIFIED DEPRESSION TYPE: ICD-10-CM

## 2025-06-06 RX ORDER — FAMOTIDINE 20 MG/1
20 TABLET, FILM COATED ORAL EVERY 12 HOURS
Qty: 180 TABLET | Refills: 3 | Status: SHIPPED | OUTPATIENT
Start: 2025-06-06

## 2025-06-06 RX ORDER — FLUTICASONE PROPIONATE 50 MCG
SPRAY, SUSPENSION (ML) NASAL
Qty: 48 G | Refills: 3 | Status: SHIPPED | OUTPATIENT
Start: 2025-06-06

## 2025-06-06 RX ORDER — LEVOTHYROXINE SODIUM 50 UG/1
50 TABLET ORAL
Qty: 90 TABLET | Refills: 3 | Status: SHIPPED | OUTPATIENT
Start: 2025-06-06

## 2025-06-06 RX ORDER — DOCUSATE SODIUM 100 MG/1
100 CAPSULE, LIQUID FILLED ORAL 3 TIMES DAILY PRN
Qty: 90 CAPSULE | Refills: 3 | Status: SHIPPED | OUTPATIENT
Start: 2025-06-06

## 2025-06-06 RX ORDER — SPIRONOLACTONE 25 MG/1
TABLET ORAL
Qty: 30 TABLET | Refills: 1 | Status: CANCELLED | OUTPATIENT
Start: 2025-06-06

## 2025-06-06 RX ORDER — CLONIDINE HYDROCHLORIDE 0.1 MG/1
0.1 TABLET ORAL
Qty: 90 TABLET | Refills: 0 | Status: SHIPPED | OUTPATIENT
Start: 2025-06-06

## 2025-06-06 RX ORDER — TOPIRAMATE 100 MG/1
100 TABLET, FILM COATED ORAL 2 TIMES DAILY
Qty: 180 TABLET | Refills: 0 | Status: SHIPPED | OUTPATIENT
Start: 2025-06-06

## 2025-06-06 RX ORDER — QUETIAPINE FUMARATE 200 MG/1
600 TABLET, FILM COATED ORAL NIGHTLY
Qty: 90 TABLET | Refills: 0 | Status: SHIPPED | OUTPATIENT
Start: 2025-06-06

## 2025-06-06 RX ORDER — CETIRIZINE HYDROCHLORIDE 5 MG/1
5 TABLET ORAL DAILY
Qty: 90 TABLET | Refills: 3 | Status: SHIPPED | OUTPATIENT
Start: 2025-06-06

## 2025-06-11 ENCOUNTER — TELEPHONE (OUTPATIENT)
Dept: OBSTETRICS AND GYNECOLOGY | Age: 40
End: 2025-06-11

## 2025-06-11 NOTE — TELEPHONE ENCOUNTER
Caller: Krupa Concepcion    Relationship: Self    Best call back number: [phone]    What form or medical record are you requesting: REQ ALL MEDICAL RECORDS     Who is requesting this form or medical record from you: SELF     How would you like to receive the form or medical records (pick-up, mail, fax): MAIL     If mail, what is the address: [address] [place] [address], [id number]  Timeframe paperwork needed: ASAP     Additional notes: PT IS REQ COPY OF HER MEDICAL RECORDS ASAP, SHE HAS APPT WITH  ON 06/19/25    PT STATES SHE COULD ALSO RECVD EMAIL IF POSS.   [email or web address]    ALSO IF OFFICE COULD CALL PT IF/WHEN PAPERWORK HAS BEEN MAILED OR EMAILED.

## 2025-07-10 ENCOUNTER — OFFICE VISIT (OUTPATIENT)
Dept: PSYCHIATRY | Facility: CLINIC | Age: 40
End: 2025-07-10
Payer: COMMERCIAL

## 2025-07-10 VITALS
DIASTOLIC BLOOD PRESSURE: 61 MMHG | WEIGHT: 143 LBS | BODY MASS INDEX: 28.83 KG/M2 | HEART RATE: 93 BPM | SYSTOLIC BLOOD PRESSURE: 110 MMHG | HEIGHT: 59 IN

## 2025-07-10 DIAGNOSIS — F41.1 GENERALIZED ANXIETY DISORDER: ICD-10-CM

## 2025-07-10 DIAGNOSIS — F31.60 BIPOLAR 1 DISORDER, MIXED: Primary | Chronic | ICD-10-CM

## 2025-07-10 PROCEDURE — 90792 PSYCH DIAG EVAL W/MED SRVCS: CPT

## 2025-07-10 RX ORDER — CLONIDINE HYDROCHLORIDE 0.1 MG/1
0.1 TABLET ORAL
Qty: 90 TABLET | Refills: 0 | Status: SHIPPED | OUTPATIENT
Start: 2025-07-10

## 2025-07-10 RX ORDER — QUETIAPINE FUMARATE 200 MG/1
600 TABLET, FILM COATED ORAL NIGHTLY
Qty: 270 TABLET | Refills: 1 | Status: SHIPPED | OUTPATIENT
Start: 2025-07-10 | End: 2026-01-06

## 2025-07-10 RX ORDER — TOPIRAMATE 100 MG/1
100 TABLET, FILM COATED ORAL 2 TIMES DAILY
Qty: 180 TABLET | Refills: 0 | Status: SHIPPED | OUTPATIENT
Start: 2025-07-10

## 2025-07-10 NOTE — PROGRESS NOTES
"[facility]  Initial Evaluation    Referring Provider:  Rio Awan MD  [address]  [id number]  [place] [address]    Chief Complaint: \"I really need to to stay on what I am taking, I feel happy healthy\"     History of Present Illness: Krupa Concepcion is a guarded  40 y.o. female who presents today for initial evaluation regarding . Krupa presents unaccompanied in no acute distress and engages with me appropriately. Psychotropic regimen with which patient presents is described as clonidine, quetiapine 600 mg po q HS.     History is positive for signs/symptoms suggestive of history of periods with reduced need for sleep, excess energy, distractibility, irritability, impulsivity typically lasting multiple days alternating with several-week periods of depressive symptoms: she currently feels like her medications are working quite well. She is prescribed Topamax 100 mg po BID, quetiapine 600 mg po q HS, and clonidine 0.1 mg po q HS.     Krupa has a history of consistent and excessive worry across several domains of life that contributes to tension and irritability throughout the day. Topamax 100 mg po BID, quetiapine 600 mg po q HS, and clonidine 0.1 mg po q HS.       Psychiatric screening is negative for pathognomonic history of: seizures.    I have counseled the patient with regard to diagnoses and the recommended treatment regimen as documented below: I will assume prescriptive responsibility for quetiapine, topiramate,  and clonidine. Patient's narrative was very difficult to follow during the encounter.  Patient displayed a nonlinear linear pattern of thinking, making associative leaps between topics that required redirection. Despite extended discussion patient did not fully elaborate on relevant history; additional collateral or longitudinal assessment may be needed. Patient alluded to difficulties in their upbringing and described current family " "relationships as strained. There were indications of unresolved or impactful past experiences; trauma history cannot be ruled out at this time. The patient did voice that she was going to [facility] for medication management and therapy. Discussed with patient that I would prescribe medications until prescriber at [facility] was established-to avoid lapse in treatment. Patient agreeable to this-she voiced that she would come back in one month.    The patient acknowledges the diagnoses per my rendered interpretation. Patient demonstrates awareness/understanding of viable alternatives for treatment as well as potential risks, benefits, and side effects associated with this regimen and is amenable to proceed in this fashion.     Recommended lifestyle changes: 30 minutes of activity to increase HR 2-3 days weekly.    Psychiatric History:  Diagnoses: depression, bipolar disorder  Outpatient history: [facility]   Inpatient history: multiple hospitalizations  Medication trials: \"a lot of them do not work\"   Other treatment modalities: unable to assess  Self harm: none  Suicide attempts: \"In my opinion I have never done that\"   Abuse or neglect: \"my parents and my family love me deeply\" \"I want to keep moving forward-our relationships are moving forward\"     Substance Abuse History:   Types/methods/frequency: \"drink a little bit\"  Transtheoretical stage: Precontemplation  \"Social drinker\"   Social History:  Residence: lives \"basically I live in the house that my parent's own\"  Vocation: still on SSI  Source of income: SSI  Last grade completed: some college-art   Pertinent developmental history: none  Pertinent legal history: \"arrested for trespassing\" released, ex took an EPO against me  Hobbies/interests: gardening, art  Jew: non-Anabaptist, Buddhism camp, your reality is the reality you were born into   Exercise: gardening   Dietary habits: eats healthy for my family   Sleep hygiene: \"I " "probably sleep more than the average person\" 12 hours   Social System of Support: would like to have someone in my life,   Sunlight: There are no concern for under-exposure.  Caffeine intake: some soda  Hydration habits: no pertinent issues    history: No    Social History     Socioeconomic History    Marital status: Single   Tobacco Use    Smoking status: Never     Passive exposure: Never    Smokeless tobacco: Never   Vaping Use    Vaping status: Never Used   Substance and Sexual Activity    Alcohol use: Yes    Drug use: Never    Sexual activity: Yes     Partners: Male     Birth control/protection: Tubal ligation     Comment: paragard     Access to Firearms: \"I am not in the \"    Tobacco use counseling/intervention: N/A, patient does not use tobacco    PHQ-9 Depression Screening  PHQ-9 Total Score: 0    Little interest or pleasure in doing things? Not at all   Feeling down, depressed, or hopeless? Not at all   PHQ-2 Total Score 0   Trouble falling or staying asleep, or sleeping too much? Not at all   Feeling tired or having little energy? Not at all   Poor appetite or overeating? Not at all   Feeling bad about yourself - or that you are a failure or have let yourself or your family down? Not at all   Trouble concentrating on things, such as reading the newspaper or watching television? Not at all   Moving or speaking so slowly that other people could have noticed? Or the opposite - being so fidgety or restless that you have been moving around a lot more than usual? Not at all   Thoughts that you would be better off dead, or of hurting yourself in some way? Not at all   PHQ-9 Total Score 0   If you checked off any problems, how difficult have these problems made it for you to do your work, take care of things at home, or get along with other people? Not difficult at all       MIKEY-7  Feeling nervous, anxious or on edge: Not at all  Not being able to stop or control worrying: Not at all  Worrying too " much about different things: Not at all  Trouble Relaxing: Not at all  Being so restless that it is hard to sit still: Not at all  Feeling afraid as if something awful might happen: Not at all  Becoming easily annoyed or irritable: Several days  MIKEY 7 Total Score: 1  If you checked any problems, how difficult have these problems made it for you to do your work, take care of things at home, or get along with other people: Not difficult at all (I DO HAVE THE ABILITY TO FEEL LIKE I WANT SOME RELAXATION TIME OR BREAK FROM THE DAY TODAY WHICH IS WHY I CAN GET BORED WITH IT OR WANT HELP.)    Problem List:  Patient Active Problem List   Diagnosis    Bipolar 1 disorder, mixed    Gastroesophageal reflux disease    Hypothyroidism    Obesity    Allergic rhinitis    Vitamin D deficiency    Personal history of anaphylaxis    Hx of migraine headaches    Alternating exotropia    Diplopia    Menorrhagia with regular cycle    History of cervical dysplasia    Encounter for female sterilization procedure     Allergy:   Allergies   Allergen Reactions    Zolpidem Tartrate Mental Status Change    Trazodone Myalgia    Benzonatate Other (See Comments)     Does not work;pt states she is not allergic.     Cefdinir Hallucinations     hallucinations        Discontinued Medications:  Medications Discontinued During This Encounter   Medication Reason    topiramate (TOPAMAX) 100 MG tablet Reorder    QUEtiapine (SEROquel) 200 MG tablet Reorder    cloNIDine (CATAPRES) 0.1 MG tablet Reorder       Current Medications:   Current Outpatient Medications   Medication Sig Dispense Refill    cetirizine (zyrTEC) 5 MG tablet Take 1 tablet by mouth Daily. 90 tablet 3    cloNIDine (CATAPRES) 0.1 MG tablet Take 1 tablet by mouth every night at bedtime. 90 tablet 0    docusate sodium (COLACE) 100 MG capsule Take 1 capsule by mouth 3 (Three) Times a Day As Needed for Constipation. 90 capsule 3    famotidine (PEPCID) 20 MG tablet Take 1 tablet by mouth Every 12  (Twelve) Hours. 180 tablet 3    fluticasone (FLONASE) 50 MCG/ACT nasal spray SHAKE LIQUID AND USE 2 SPRAYS IN EACH NOSTRIL EVERY EVENING AS NEEDED 48 g 3    levothyroxine (SYNTHROID, LEVOTHROID) 50 MCG tablet Take 1 tablet by mouth Every Morning. 90 tablet 3    MELATONIN GUMMIES PO Take 10 mg by mouth As Needed (as needed for sleep).      multivitamin with minerals tablet tablet Take 1 tablet by mouth Daily.      polyethylene glycol (MIRALAX) 17 g packet Take 17 g by mouth Daily As Needed (as needed for constipation). 100 each 3    QUEtiapine (SEROquel) 200 MG tablet Take 3 tablets by mouth Every Night for 180 days. 270 tablet 1    Sodium Fluoride 5000 PPM 1.1 % gel Apply 1.1 Applications topically Every Night.      spironolactone (ALDACTONE) 25 MG tablet TAKE 1/2 TABLET BY MOUTH EVERY MORNING AS NEEDED FOR FLUID RETENTION 30 tablet 1    topiramate (TOPAMAX) 100 MG tablet Take 1 tablet by mouth 2 (Two) Times a Day. 180 tablet 0    Wheat Dextrin (Benefiber) chewable tablet Chew.      Cholecalciferol 25 MCG (1000 UT) tablet Take 2 tablets by mouth Daily. (Patient not taking: Reported on 7/10/2025)      ondansetron ODT (ZOFRAN-ODT) 4 MG disintegrating tablet Take 1 tablet by mouth Every 8 (Eight) Hours As Needed. (Patient not taking: Reported on 7/10/2025)       No current facility-administered medications for this visit.     Past Medical History:  Past Medical History:   Diagnosis Date    Abnormal Pap smear of cervix     Anesthesia     SLOW TO WAKE UP AND IS EMOTIONAL    Anxiety     Arrhythmia     Asthma     AS A CHILD    Chest pain 08/14/2019    HX OF CHEST PAIN. SAW DR GARCIA IN 2020 PT REPORTS HAD CARDIAC TESTING AND WAS RELEASED. FOLLOWED BY PCP. DENIES CP/SOA AND IS ACTIVE    Depression     Disease of thyroid gland     Endometriosis     Had tubal    GERD (gastroesophageal reflux disease)     HPV (human papilloma virus) infection     Migraine without aura     PMS (premenstrual syndrome)     Teen    Schizoaffective  disorder     REPORTS IS BEING EVALUATED    Trauma     Varicella     As child     Past Surgical History:  Past Surgical History:   Procedure Laterality Date    BREAST BIOPSY Left 2023    (-)    CARDIAC CATHETERIZATION N/A 07/08/2020    Procedure: Left Heart Cath with Coronary Angiography;  Surgeon: Lilbieth Langley MD;  Location: [facility] CATH INVASIVE LOCATION;  Service: Cardiovascular;  Laterality: N/A;    CERVICAL CONIZATION, LEEP      COLONOSCOPY      D & C HYSTEROSCOPY N/A 08/15/2023    Procedure: DILATATION AND CURETTAGE HYSTEROSCOPY, IUD REMOVAL;  Surgeon: Aye Vasquez DO;  Location: [facility] MAIN OR;  Service: Gynecology;  Laterality: N/A;    DENTAL PROCEDURE      EAR TUBES Bilateral     x2 times    ENDOSCOPY      SALPINGECTOMY Bilateral 08/15/2023    Procedure: SALPINGECTOMY LAPAROSCOPIC;  Surgeon: Aye Vasquez DO;  Location: [facility] OR;  Service: Gynecology;  Laterality: Bilateral;    TONSILLECTOMY      TUBAL ABDOMINAL LIGATION      Infection from enermetrosis    WISDOM TOOTH EXTRACTION       Family History:   Family History   Problem Relation Age of Onset    Hypertension Mother     Migraines Mother     Osteoarthritis Mother     Hyperlipidemia Father     Hypertension Father     Prostate cancer Father     Depression Sister     No Known Problems Brother     Breast cancer Maternal Aunt     Cervical cancer Maternal Aunt     No Known Problems Paternal Aunt     No Known Problems Maternal Uncle     No Known Problems Paternal Uncle     Colon cancer Maternal Grandfather     No Known Problems Maternal Grandmother     No Known Problems Paternal Grandfather     No Known Problems Paternal Grandmother     No Known Problems Cousin     No Known Problems Other     Ovarian cancer Neg Hx     Uterine cancer Neg Hx     Melanoma Neg Hx     ADD / ADHD Neg Hx     Alcohol abuse Neg Hx     Anxiety disorder Neg Hx     Bipolar disorder Neg Hx     Dementia Neg Hx     Drug abuse Neg Hx     OCD Neg Hx     Paranoid behavior  "Neg Hx     Schizophrenia Neg Hx     Seizures Neg Hx     Self-Injurious Behavior  Neg Hx     Suicide Attempts Neg Hx        MENTAL STATUS EXAM   General Appearance:  Cleanly groomed and dressed and well developed  Eye Contact:  Good eye contact  Attitude:  Cooperative, polite and candid  Motor Activity:  Normal gait, posture  Muscle Strength:  Normal  Language:  Spontaneous  Mood and affect:  Elevated, mood incongruent, anxious and happy  Thought Process:  Circumstantial  Associations/ Thought Content:  No delusions  Hallucinations:  None  Suicidal Ideations:  Not present  Homicidal Ideation:  Not present  Sensorium:  Alert and clear  Orientation:  Person, place, situation and time  Immediate Recall, Recent, and Remote Memory:  Intact  Attention Span/ Concentration:  Good  Fund of Knowledge:  Appropriate for age and educational level  Intellectual Functioning:  Average range  Insight:  Limited  Judgement:  Limited  Reliability:  Fair  Impulse Control:  Fair       Vital Signs:   /61   Pulse 93   Ht 149.9 cm (59\")   Wt 64.9 kg (143 lb)   BMI 28.88 kg/m²    Lab Results:   No visits with results within 6 Month(s) from this visit.   Latest known visit with results is:   Lab on 04/10/2024   Component Date Value Ref Range Status    Folate 04/10/2024 >20.00  4.78 - 24.20 ng/mL Final    Magnesium 04/10/2024 2.4  1.6 - 2.6 mg/dL Final    Vitamin B-12 04/10/2024 375  211 - 946 pg/mL Final    25 Hydroxy, Vitamin D 04/10/2024 38.9  30.0 - 100.0 ng/ml Final    TSH 04/10/2024 1.620  0.270 - 4.200 uIU/mL Final    Free T4 04/10/2024 1.02  0.93 - 1.70 ng/dL Final    Total Cholesterol 04/10/2024 158  0 - 200 mg/dL Final    Triglycerides 04/10/2024 55  0 - 150 mg/dL Final    HDL Cholesterol 04/10/2024 47  40 - 60 mg/dL Final    LDL Cholesterol  04/10/2024 100  0 - 100 mg/dL Final    VLDL Cholesterol 04/10/2024 11  5 - 40 mg/dL Final    LDL/HDL Ratio 04/10/2024 2.13   Final    Glucose 04/10/2024 80  65 - 99 mg/dL Final    BUN " 04/10/2024 14  6 - 20 mg/dL Final    Creatinine 04/10/2024 0.95  0.57 - 1.00 mg/dL Final    Sodium 04/10/2024 141  136 - 145 mmol/L Final    Potassium 04/10/2024 3.6  3.5 - 5.2 mmol/L Final    Chloride 04/10/2024 110 (H)  98 - 107 mmol/L Final    CO2 04/10/2024 18.1 (L)  22.0 - 29.0 mmol/L Final    Calcium 04/10/2024 8.9  8.6 - 10.5 mg/dL Final    Total Protein 04/10/2024 7.3  6.0 - 8.5 g/dL Final    Albumin 04/10/2024 4.6  3.5 - 5.2 g/dL Final    ALT (SGPT) 04/10/2024 12  1 - 33 U/L Final    AST (SGOT) 04/10/2024 12  1 - 32 U/L Final    Alkaline Phosphatase 04/10/2024 66  39 - 117 U/L Final    Total Bilirubin 04/10/2024 0.7  0.0 - 1.2 mg/dL Final    Globulin 04/10/2024 2.7  gm/dL Final    A/G Ratio 04/10/2024 1.7  g/dL Final    BUN/Creatinine Ratio 04/10/2024 14.7  7.0 - 25.0 Final    Anion Gap 04/10/2024 12.9  5.0 - 15.0 mmol/L Final    eGFR 04/10/2024 77.4  >60.0 mL/min/1.73 Final       ASSESSMENT AND PLAN:    ICD-10-CM ICD-9-CM   1. Bipolar 1 disorder, mixed  F31.60 296.60   2. Generalized anxiety disorder  F41.1 300.02       Krupa who presents today for initial evaluation regarding psychiatric interview. We have discussed the history and interpreted diagnoses as above as well as the treatment plan below, including potential R/B/SE of the recommended regimen of which the patient demonstrates understanding. Patient is agreeable to call 911 or go to the nearest ER should she become concerned for her own safety and/or the safety of those around her. There are not overt indices of acute demian/psychosis on evaluation today.     Medication regimen: continue clonidine 0.1 mg po q HS for sleep,  continue topiramate 100 mg po BID for mood,continue quetiapine 600 mg po q HS. The patient is advised not to misuse prescribed medications or to use any exogenous substances that aren't disclosed to this provider as they may interact with the regimen to her detriment.   Risk Assessment: protracted risk is moderate, imminent  risk is moderate.  Risk factors include: anxiety disorder, mood disorder, and recent/ongoing psychosocial stressors. Protective factors include: no known family history of suicidality, intact reality testing, no substance use disorder, no access to firearms, no present SI, no stated history of suicide attempts or self-harm, patient's exhibited future-orientation, strong social support, and patient's cooperation with care. Do note that this is subject to change with the Gnosticism of new stressors, treatment non-adherence, use of substances, and/or new medical ails.  Monitoring: reviewed labs/imaging as populated above, PHQ-9 today is PHQ-9 Total Score: 0  /27, MIKEY-7 today is 1/21  Therapy: will meet with [facility] in [place]   Follow-up: one month  Communications: N/A    TREATMENT PLAN/GOALS: challenge patterns of living conducive to symptom burden, implement recommended regimen as above with augmentative, intermittent supportive psychotherapy to reduce symptom burden. Patient acknowledged and verbally consented to begin treatment as above. The importance of adherence to the recommended treatment and interval follow-up appointments was emphasized today. Patient was today advised to limit daily caffeine intake, hydrate appropriately, eat healthy and nutritious foods, engage sleep hygiene measures, engage appropriate exposure to sunlight, engage with hobbies in balance with life necessities, and exercise appropriate to their capacity to do so.     Billing: I have seen the patient today and considered her psychiatric complaints, rendered a diagnosis, and discussed treatment with the patient as above with which she consents.    Parts of this note are electronic transcriptions/translations of spoken language to printed text using the Dragon Dictation system.    Electronically signed by CHAPARRO Cha, 07/10/25,

## 2025-07-29 ENCOUNTER — TELEPHONE (OUTPATIENT)
Dept: PSYCHIATRY | Facility: CLINIC | Age: 40
End: 2025-07-29
Payer: COMMERCIAL

## 2025-07-29 NOTE — TELEPHONE ENCOUNTER
CALLED PT SPOKE STATES DOES NOT RECORDS THE OTHER OFFICE WILL FAX OVER A REQUEST FOR MEDICAL RECORDS

## (undated) DEVICE — KT ANTI FOG W/FLD AND SPNG

## (undated) DEVICE — LITHOTOMY-SLINGS: Brand: MEDLINE INDUSTRIES, INC.

## (undated) DEVICE — TOTAL TRAY, 16FR 10ML SIL FOLEY, URN: Brand: MEDLINE

## (undated) DEVICE — PK TRY HEART CATH 50

## (undated) DEVICE — GOWN,REINFORCE,POLY,SIRUS,BREATH SLV,XLG: Brand: MEDLINE

## (undated) DEVICE — LITHOTOMY-YELLOW FINS: Brand: MEDLINE INDUSTRIES, INC.

## (undated) DEVICE — SKIN PREP TRAY W/CHG: Brand: MEDLINE INDUSTRIES, INC.

## (undated) DEVICE — INTENDED FOR TISSUE SEPARATION, AND OTHER PROCEDURES THAT REQUIRE A SHARP SURGICAL BLADE TO PUNCTURE OR CUT.: Brand: BARD-PARKER ® CARBON RIB-BACK BLADES

## (undated) DEVICE — DUAL LUMEN STOMACH TUBE,ANTI-REFLUX VALVE: Brand: SALEM SUMP

## (undated) DEVICE — MARYLAND JAW LAPAROSCOPIC SEALER/DIVIDER COATED: Brand: LIGASURE

## (undated) DEVICE — CATH DIAG IMPULSE FR4 5F 100CM

## (undated) DEVICE — INSUFFLATION NEEDLE TO ESTABLISH PNEUMOPERITONEUM.: Brand: INSUFFLATION NEEDLE

## (undated) DEVICE — 1000ML,PRESSURE INFUSER W/STOPCOCK: Brand: MEDLINE

## (undated) DEVICE — CATH URETH INTRMIT ALLPURP LTX 16F RED

## (undated) DEVICE — VAGINAL PREP TRAY: Brand: MEDLINE INDUSTRIES, INC.

## (undated) DEVICE — ENDOPATH XCEL WITH OPTIVIEW TECHNOLOGY BLADELESS TROCARS WITH STABILITY SLEEVES: Brand: ENDOPATH XCEL OPTIVIEW

## (undated) DEVICE — PCH SURG INST LAP 2 LF

## (undated) DEVICE — ENDOPATH XCEL BLADELESS TROCARS WITH STABILITY SLEEVES: Brand: ENDOPATH XCEL

## (undated) DEVICE — SLV SCD KN/LEN ADJ EXPRSS BLENDED MD 1P/U

## (undated) DEVICE — 40418 TRENDELENBURG ONE-STEP ARM PROTECTORS LARGE (1 PAIR): Brand: 40418 TRENDELENBURG ONE-STEP ARM PROTECTORS LARGE (1 PAIR)

## (undated) DEVICE — TOWEL,OR,DSP,ST,BLUE,STD,4/PK,20PK/CS: Brand: MEDLINE

## (undated) DEVICE — SYR LL TP 10ML STRL

## (undated) DEVICE — CATH DIAG IMPULSE FL4 5F 100CM

## (undated) DEVICE — NDL HYPO ECLPS SFTY 22G 1 1/2IN

## (undated) DEVICE — PINNACLE INTRODUCER SHEATH: Brand: PINNACLE

## (undated) DEVICE — GLV SURG BIOGEL LTX PF 6 1/2

## (undated) DEVICE — NDL HYPO PRECISIONGLIDE REG 22G 1 1/2

## (undated) DEVICE — ADHS SKIN SURG TISS VISC PREMIERPRO EXOFIN HI/VISC FAST/DRY

## (undated) DEVICE — GW DIAG EMERALD HEPCOAT MOVE JTIP STD .035 3MM 150CM

## (undated) DEVICE — RADIFOCUS OBTURATOR: Brand: RADIFOCUS

## (undated) DEVICE — SOL NACL 0.9PCT 1000ML

## (undated) DEVICE — SUT MNCRYL PLS ANTIB UD 4/0 PS2 18IN

## (undated) DEVICE — CATH DIAG IMPULSE PIG 5F 100CM

## (undated) DEVICE — TBG INSUFFLATION W/CPC CONNEC: Brand: MEDLINE INDUSTRIES, INC.